# Patient Record
Sex: FEMALE | Race: WHITE | NOT HISPANIC OR LATINO | Employment: OTHER | URBAN - METROPOLITAN AREA
[De-identification: names, ages, dates, MRNs, and addresses within clinical notes are randomized per-mention and may not be internally consistent; named-entity substitution may affect disease eponyms.]

---

## 2017-01-07 RX ORDER — CLOPIDOGREL BISULFATE 75 MG/1
TABLET ORAL
Qty: 30 TABLET | Refills: 2 | Status: SHIPPED | OUTPATIENT
Start: 2017-01-07 | End: 2017-03-09

## 2017-01-09 ENCOUNTER — LAB (OUTPATIENT)
Dept: FAMILY MEDICINE CLINIC | Facility: CLINIC | Age: 82
End: 2017-01-09

## 2017-01-09 DIAGNOSIS — R79.89 ELEVATED SERUM CREATININE: ICD-10-CM

## 2017-01-09 LAB
ANION GAP SERPL CALCULATED.3IONS-SCNC: 15 MMOL/L
BUN BLD-MCNC: 34 MG/DL (ref 8–23)
BUN/CREAT SERPL: 27.2 (ref 7–25)
CALCIUM SPEC-SCNC: 8.9 MG/DL (ref 8.6–10.5)
CHLORIDE SERPL-SCNC: 100 MMOL/L (ref 98–107)
CO2 SERPL-SCNC: 29 MMOL/L (ref 22–29)
CREAT BLD-MCNC: 1.25 MG/DL (ref 0.57–1)
GFR SERPL CREATININE-BSD FRML MDRD: 41 ML/MIN/1.73
GLUCOSE BLD-MCNC: 88 MG/DL (ref 65–99)
POTASSIUM BLD-SCNC: 4.2 MMOL/L (ref 3.5–5.2)
SODIUM BLD-SCNC: 144 MMOL/L (ref 136–145)

## 2017-01-09 PROCEDURE — 80048 BASIC METABOLIC PNL TOTAL CA: CPT | Performed by: INTERNAL MEDICINE

## 2017-01-19 ENCOUNTER — TELEPHONE (OUTPATIENT)
Dept: FAMILY MEDICINE CLINIC | Facility: CLINIC | Age: 82
End: 2017-01-19

## 2017-01-19 NOTE — TELEPHONE ENCOUNTER
----- Message from Devan Snow Jr., MD sent at 1/19/2017 12:31 PM EST -----  Contact: LICO- DAUGHTER  [phone]  Patient's daughter was here discussed this with her briefly she does bring in a chart of her various blood pressure readings as going up to 202 diastolic historically has taken a half tablet once a day is to half tablet twice a day order Coreg 3.125 she is now up to half tablet 3 times a day to try to keep her controlled is still bouncing up to a systolic range in the 170s to 200.  Regarding change her to a whole tablet the morning whole tablet about 6 PM she is to call in some readings early tomorrow afternoon historically patient's been very sensitive medications and does tend has some orthostatic hypotension we will see what further adjustments we need.  She is on a very low-dose of Coreg but is been sensitive to this and all other blood pressure medicines we have tried to.  So expect a call tomorrow early afternoon and then we will see if we need to further address this  ----- Message -----     From: Clare Macedo     Sent: 1/19/2017  11:49 AM       To: Devan Snow Jr., MD, Mini Carr MA    PATIENTS BLOOD PRESSURE /87 PATIENT HAS BEEN TAKING AND EXTRA 1/2 OR A WHOLE ONE AND ITS NOT BRINGING BLOOD PRESSURE DOWN. BLOOD PRESSURE IS HIGH ABOUT EVERY OTHER DAY.

## 2017-01-25 RX ORDER — CARVEDILOL 3.12 MG/1
TABLET ORAL
Qty: 45 TABLET | Refills: 4 | Status: SHIPPED | OUTPATIENT
Start: 2017-01-25 | End: 2017-06-29 | Stop reason: SDUPTHER

## 2017-02-10 RX ORDER — PRAVASTATIN SODIUM 40 MG
TABLET ORAL
Qty: 30 TABLET | Refills: 0 | Status: SHIPPED | OUTPATIENT
Start: 2017-02-10 | End: 2017-04-06 | Stop reason: SDUPTHER

## 2017-02-10 RX ORDER — FUROSEMIDE 20 MG/1
TABLET ORAL
Qty: 60 TABLET | Refills: 0 | Status: SHIPPED | OUTPATIENT
Start: 2017-02-10 | End: 2017-03-08 | Stop reason: SDUPTHER

## 2017-02-10 RX ORDER — PRAVASTATIN SODIUM 40 MG
TABLET ORAL
Qty: 30 TABLET | Refills: 0 | Status: SHIPPED | OUTPATIENT
Start: 2017-02-10 | End: 2017-03-09

## 2017-02-17 ENCOUNTER — PATIENT OUTREACH (OUTPATIENT)
Dept: CASE MANAGEMENT | Facility: OTHER | Age: 82
End: 2017-02-17

## 2017-02-23 ENCOUNTER — PATIENT OUTREACH (OUTPATIENT)
Dept: CASE MANAGEMENT | Facility: OTHER | Age: 82
End: 2017-02-23

## 2017-02-24 RX ORDER — ESCITALOPRAM OXALATE 10 MG/1
TABLET ORAL
Qty: 30 TABLET | Refills: 0 | Status: SHIPPED | OUTPATIENT
Start: 2017-02-24 | End: 2017-03-09

## 2017-03-08 RX ORDER — FUROSEMIDE 20 MG/1
TABLET ORAL
Qty: 60 TABLET | Refills: 0 | Status: SHIPPED | OUTPATIENT
Start: 2017-03-08 | End: 2017-03-09

## 2017-03-08 RX ORDER — LEVOTHYROXINE SODIUM 0.05 MG/1
TABLET ORAL
Qty: 30 TABLET | Refills: 1 | Status: SHIPPED | OUTPATIENT
Start: 2017-03-08 | End: 2017-05-06 | Stop reason: SDUPTHER

## 2017-03-09 ENCOUNTER — OFFICE VISIT (OUTPATIENT)
Dept: FAMILY MEDICINE CLINIC | Facility: CLINIC | Age: 82
End: 2017-03-09

## 2017-03-09 VITALS
TEMPERATURE: 97.8 F | HEART RATE: 79 BPM | HEIGHT: 60 IN | WEIGHT: 164.2 LBS | OXYGEN SATURATION: 94 % | DIASTOLIC BLOOD PRESSURE: 56 MMHG | BODY MASS INDEX: 32.24 KG/M2 | SYSTOLIC BLOOD PRESSURE: 140 MMHG

## 2017-03-09 DIAGNOSIS — M79.671 FOOT ARCH PAIN, RIGHT: Primary | ICD-10-CM

## 2017-03-09 PROCEDURE — 73620 X-RAY EXAM OF FOOT: CPT | Performed by: INTERNAL MEDICINE

## 2017-03-09 PROCEDURE — 99213 OFFICE O/P EST LOW 20 MIN: CPT | Performed by: INTERNAL MEDICINE

## 2017-03-09 NOTE — PROGRESS NOTES
Subjective   Meggan Huynh is a 87 y.o. female.   Pain bruising right foot for approximately 3 days  History of Present Illness   This began happening 1 day bruising noted of the next  no specific injury.  No increased activity to raise risk of stress fracture.  Plain pain predominantly dorsal lateral foot with some bruising present himself some clear fluid in this area also.  No increased heat low pressure continues to be labile although controlled at her historical norm  Review of Systems   Musculoskeletal:        Pain right foot patient complaint see present illness   All other systems reviewed and are negative.      Objective   Vitals:    03/09/17 1410   BP: 140/56   Pulse: 79   Temp: 97.8 °F (36.6 °C)   SpO2: 94%   Weight: 164 lb 3.2 oz (74.5 kg)     Physical Exam   Constitutional: She appears well-developed and well-nourished.   Cardiovascular: Normal rate, regular rhythm and normal heart sounds.    Pulmonary/Chest: Effort normal and breath sounds normal.   Musculoskeletal:   Bruising dorsal lateral foot.  Mild pain over the bruised areas fourth and fifth and third metatarsals.  Mild discomfort with bowing of the arch.  Bruising is predominantly dorsal third metatarsal laterally.  Some slightly raised fluid collection cannot tell this reabsorption of the bruising fluid or not.  Patient denies any knowledge of any injury to explain this.  No near falls   Nursing note and vitals reviewed.      No results found for: INR    Procedures  Right foot x-ray 3 views obtained.  No comparison available.  No bony or soft tissue abnormalities are noted.  Assessment/Plan     Right foot pain plan were beyond that time her eyes needed elevate foot some periodically through the day relative rest patient is to wear supportive shoes when up if pain does not resolve or dramatically improved in 2 weeks time she is contact us determine if we will follow-up with her sent on orthopedic surgeon with a concern being a potential stress  fracture at that point    Much of this encounter note is an electronic transcription/translation of spoken language to printed text.  The electronic translation of spoken language may permit erroneous, or at times, nonsensical words or phrases to be inadvertently transcribed.  Although I have reviewed the note for such errors, some may still exist.

## 2017-03-21 DIAGNOSIS — E78.5 HYPERLIPIDEMIA, UNSPECIFIED HYPERLIPIDEMIA TYPE: Primary | ICD-10-CM

## 2017-03-23 RX ORDER — ESCITALOPRAM OXALATE 10 MG/1
TABLET ORAL
Qty: 30 TABLET | Refills: 0 | Status: SHIPPED | OUTPATIENT
Start: 2017-03-23 | End: 2017-04-11 | Stop reason: SDUPTHER

## 2017-04-06 RX ORDER — PRAVASTATIN SODIUM 40 MG
TABLET ORAL
Qty: 30 TABLET | Refills: 0 | Status: SHIPPED | OUTPATIENT
Start: 2017-04-06 | End: 2017-05-04 | Stop reason: SDUPTHER

## 2017-04-06 RX ORDER — FUROSEMIDE 20 MG/1
TABLET ORAL
Qty: 60 TABLET | Refills: 0 | Status: SHIPPED | OUTPATIENT
Start: 2017-04-06 | End: 2017-04-11 | Stop reason: SDUPTHER

## 2017-04-06 RX ORDER — CLOPIDOGREL BISULFATE 75 MG/1
TABLET ORAL
Qty: 30 TABLET | Refills: 1 | Status: SHIPPED | OUTPATIENT
Start: 2017-04-06 | End: 2017-04-11 | Stop reason: SDUPTHER

## 2017-04-11 ENCOUNTER — OFFICE VISIT (OUTPATIENT)
Dept: FAMILY MEDICINE CLINIC | Facility: CLINIC | Age: 82
End: 2017-04-11

## 2017-04-11 VITALS
DIASTOLIC BLOOD PRESSURE: 68 MMHG | HEIGHT: 60 IN | SYSTOLIC BLOOD PRESSURE: 152 MMHG | BODY MASS INDEX: 32.31 KG/M2 | WEIGHT: 164.6 LBS | TEMPERATURE: 98.2 F | HEART RATE: 75 BPM | OXYGEN SATURATION: 92 %

## 2017-04-11 DIAGNOSIS — E78.5 HYPERLIPIDEMIA, UNSPECIFIED HYPERLIPIDEMIA TYPE: ICD-10-CM

## 2017-04-11 DIAGNOSIS — R79.89 LOW VITAMIN D LEVEL: ICD-10-CM

## 2017-04-11 DIAGNOSIS — N28.9 RENAL INSUFFICIENCY: ICD-10-CM

## 2017-04-11 DIAGNOSIS — E03.8 OTHER SPECIFIED HYPOTHYROIDISM: Primary | ICD-10-CM

## 2017-04-11 DIAGNOSIS — I10 HYPERTENSION, ESSENTIAL: ICD-10-CM

## 2017-04-11 LAB
25(OH)D3 SERPL-MCNC: 45.8 NG/ML (ref 30–100)
ALBUMIN SERPL-MCNC: 4.1 G/DL (ref 3.5–5.2)
ALBUMIN/GLOB SERPL: 1.2 G/DL
ALP SERPL-CCNC: 94 U/L (ref 39–117)
ALT SERPL W P-5'-P-CCNC: 25 U/L (ref 1–33)
ANION GAP SERPL CALCULATED.3IONS-SCNC: 15.6 MMOL/L
AST SERPL-CCNC: 30 U/L (ref 1–32)
BILIRUB SERPL-MCNC: 0.3 MG/DL (ref 0.1–1.2)
BUN BLD-MCNC: 25 MG/DL (ref 8–23)
BUN/CREAT SERPL: 19.2 (ref 7–25)
CALCIUM SPEC-SCNC: 9 MG/DL (ref 8.6–10.5)
CHLORIDE SERPL-SCNC: 99 MMOL/L (ref 98–107)
CHOLEST SERPL-MCNC: 151 MG/DL (ref 0–200)
CO2 SERPL-SCNC: 29.4 MMOL/L (ref 22–29)
CREAT BLD-MCNC: 1.3 MG/DL (ref 0.57–1)
ERYTHROCYTE [DISTWIDTH] IN BLOOD BY AUTOMATED COUNT: 14.3 % (ref 4.5–15)
GFR SERPL CREATININE-BSD FRML MDRD: 39 ML/MIN/1.73
GLOBULIN UR ELPH-MCNC: 3.5 GM/DL
GLUCOSE BLD-MCNC: 92 MG/DL (ref 65–99)
HCT VFR BLD AUTO: 37.8 % (ref 31–42)
HDLC SERPL-MCNC: 57 MG/DL (ref 40–60)
HGB BLD-MCNC: 12.7 G/DL (ref 12–18)
LDLC SERPL CALC-MCNC: 58 MG/DL (ref 0–100)
LDLC/HDLC SERPL: 1.01 {RATIO}
LYMPHOCYTES # BLD AUTO: 1.7 10*3/MM3 (ref 1.2–3.4)
LYMPHOCYTES NFR BLD AUTO: 17.7 % (ref 21–51)
MCH RBC QN AUTO: 28.4 PG (ref 26.1–33.1)
MCHC RBC AUTO-ENTMCNC: 33.7 G/DL (ref 33–37)
MCV RBC AUTO: 84.4 FL (ref 80–99)
MONOCYTES # BLD AUTO: 0.7 10*3/MM3 (ref 0.1–0.6)
MONOCYTES NFR BLD AUTO: 6.9 % (ref 2–9)
NEUTROPHILS # BLD AUTO: 7.2 10*3/MM3 (ref 1.4–6.5)
NEUTROPHILS NFR BLD AUTO: 75.4 % (ref 42–75)
PLATELET # BLD AUTO: 179 10*3/MM3 (ref 150–450)
PMV BLD AUTO: 9.2 FL (ref 7.1–10.5)
POTASSIUM BLD-SCNC: 4.5 MMOL/L (ref 3.5–5.2)
PROT SERPL-MCNC: 7.6 G/DL (ref 6–8.5)
RBC # BLD AUTO: 4.48 10*6/MM3 (ref 4–6)
SODIUM BLD-SCNC: 144 MMOL/L (ref 136–145)
TRIGL SERPL-MCNC: 182 MG/DL (ref 0–150)
TSH SERPL DL<=0.05 MIU/L-ACNC: 2.26 MIU/ML (ref 0.27–4.2)
VLDLC SERPL-MCNC: 36.4 MG/DL (ref 5–40)
WBC NRBC COR # BLD: 9.5 10*3/MM3 (ref 4.5–10)

## 2017-04-11 PROCEDURE — 80053 COMPREHEN METABOLIC PANEL: CPT | Performed by: INTERNAL MEDICINE

## 2017-04-11 PROCEDURE — 80061 LIPID PANEL: CPT | Performed by: INTERNAL MEDICINE

## 2017-04-11 PROCEDURE — 85025 COMPLETE CBC W/AUTO DIFF WBC: CPT | Performed by: INTERNAL MEDICINE

## 2017-04-11 PROCEDURE — 99214 OFFICE O/P EST MOD 30 MIN: CPT | Performed by: INTERNAL MEDICINE

## 2017-04-11 PROCEDURE — 84443 ASSAY THYROID STIM HORMONE: CPT | Performed by: INTERNAL MEDICINE

## 2017-04-11 PROCEDURE — 82306 VITAMIN D 25 HYDROXY: CPT | Performed by: INTERNAL MEDICINE

## 2017-04-11 RX ORDER — AZELASTINE 1 MG/ML
1 SPRAY, METERED NASAL
COMMUNITY
End: 2017-07-11 | Stop reason: SDUPTHER

## 2017-04-11 NOTE — PROGRESS NOTES
Subjective   Meggan Huynh is a 87 y.o. female.   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  Patient needs update on vitamin D has chronic renal disease we'll get updated values.  Possibly stage III but daughter functions caregiver not sure.  Patient is doing fairly well.  Does not walk a lot is somewhat setting her legs but does function quite well with a walker she's got a little lax and this is being him some difficulty getting out of chair.  Is been suggest patient do formal physical therapy she is not interested he states because she does not want to trouble her family about transferring her to and from.  It encouraged walking around the house and outside with her walker.  We will get updated vitamin D and TSH as proximal lower leg weakness seems to be bigger problem functioning this point.  Dietary habits are not great and see more carb breakfast and lunch but does do a fair job at dinner didn't attempt to encourage more fruits and vegetables.  Also suggested family start multivitamin such as Centrum Silver for women for her given her less than optimal diet.    Review of Systems   Musculoskeletal:        Leg weakness   All other systems reviewed and are negative.      Objective   Vitals:    04/11/17 1543   BP: 152/68   Pulse: 75   Temp: 98.2 °F (36.8 °C)   SpO2: 92%   Weight: 164 lb 9.6 oz (74.7 kg)     Physical Exam   Constitutional: She appears well-developed and well-nourished.   Eyes:   Pupils equal   Cardiovascular: Normal rate, regular rhythm and normal heart sounds.    Pulmonary/Chest: Effort normal and breath sounds normal.   Abdominal: Soft. Bowel sounds are normal.   Neurological: She is alert.   Mentation fairly good memory not good   Nursing note reviewed.      No results found for: INR    Procedures    Assessment/Plan   1.  Hypothyroidism plan await lab if satisfactory recheck this in one years time    2.   Hypertension historically somewhat labile.  Review diary patient's blood pressures seem to have workable system the to reading for she is higher systolic 170s was the time when she found answering kitchen otherwise within her historical normal.    3.  Low vitamin D get updated values    4.  Hyperlipidemia plan await lab if satisfactory continue present pravastatin 40 mg daily    5.  Renal insufficiency await lab value last labs suggest she is going be a chronic renal disease stage III        Much of this encounter note is an electronic transcription/translation of spoken language to printed text.  The electronic translation of spoken language may permit erroneous, or at times, nonsensical words or phrases to be inadvertently transcribed.  Although I have reviewed the note for such errors, some may still exist.

## 2017-04-13 DIAGNOSIS — R79.89 ELEVATED SERUM CREATININE: Primary | ICD-10-CM

## 2017-04-21 RX ORDER — ESCITALOPRAM OXALATE 10 MG/1
TABLET ORAL
Qty: 30 TABLET | Refills: 0 | Status: SHIPPED | OUTPATIENT
Start: 2017-04-21 | End: 2017-05-18 | Stop reason: SDUPTHER

## 2017-05-04 RX ORDER — FUROSEMIDE 20 MG/1
TABLET ORAL
Qty: 60 TABLET | Refills: 0 | Status: SHIPPED | OUTPATIENT
Start: 2017-05-04 | End: 2017-05-31 | Stop reason: SDUPTHER

## 2017-05-04 RX ORDER — PRAVASTATIN SODIUM 40 MG
TABLET ORAL
Qty: 30 TABLET | Refills: 0 | Status: SHIPPED | OUTPATIENT
Start: 2017-05-04 | End: 2017-05-31 | Stop reason: SDUPTHER

## 2017-05-08 RX ORDER — LEVOTHYROXINE SODIUM 0.05 MG/1
TABLET ORAL
Qty: 30 TABLET | Refills: 0 | Status: SHIPPED | OUTPATIENT
Start: 2017-05-08 | End: 2017-06-07 | Stop reason: SDUPTHER

## 2017-05-11 ENCOUNTER — LAB (OUTPATIENT)
Dept: FAMILY MEDICINE CLINIC | Facility: CLINIC | Age: 82
End: 2017-05-11

## 2017-05-11 DIAGNOSIS — R79.89 ELEVATED SERUM CREATININE: ICD-10-CM

## 2017-05-11 LAB
ANION GAP SERPL CALCULATED.3IONS-SCNC: 11.7 MMOL/L
BUN BLD-MCNC: 29 MG/DL (ref 8–23)
BUN/CREAT SERPL: 23.8 (ref 7–25)
CALCIUM SPEC-SCNC: 8.8 MG/DL (ref 8.6–10.5)
CHLORIDE SERPL-SCNC: 98 MMOL/L (ref 98–107)
CO2 SERPL-SCNC: 31.3 MMOL/L (ref 22–29)
CREAT BLD-MCNC: 1.22 MG/DL (ref 0.57–1)
GFR SERPL CREATININE-BSD FRML MDRD: 42 ML/MIN/1.73
GLUCOSE BLD-MCNC: 87 MG/DL (ref 65–99)
POTASSIUM BLD-SCNC: 4.2 MMOL/L (ref 3.5–5.2)
SODIUM BLD-SCNC: 141 MMOL/L (ref 136–145)

## 2017-05-11 PROCEDURE — 80048 BASIC METABOLIC PNL TOTAL CA: CPT | Performed by: INTERNAL MEDICINE

## 2017-05-18 RX ORDER — ESCITALOPRAM OXALATE 10 MG/1
TABLET ORAL
Qty: 30 TABLET | Refills: 0 | Status: SHIPPED | OUTPATIENT
Start: 2017-05-18 | End: 2017-06-20 | Stop reason: SDUPTHER

## 2017-05-31 RX ORDER — FUROSEMIDE 20 MG/1
TABLET ORAL
Qty: 60 TABLET | Refills: 0 | Status: SHIPPED | OUTPATIENT
Start: 2017-05-31 | End: 2017-06-29 | Stop reason: SDUPTHER

## 2017-05-31 RX ORDER — PRAVASTATIN SODIUM 40 MG
TABLET ORAL
Qty: 30 TABLET | Refills: 0 | Status: SHIPPED | OUTPATIENT
Start: 2017-05-31 | End: 2017-06-29 | Stop reason: SDUPTHER

## 2017-06-03 RX ORDER — CLOPIDOGREL BISULFATE 75 MG/1
TABLET ORAL
Qty: 30 TABLET | Refills: 0 | Status: SHIPPED | OUTPATIENT
Start: 2017-06-03 | End: 2017-06-15 | Stop reason: SDUPTHER

## 2017-06-07 RX ORDER — LEVOTHYROXINE SODIUM 0.05 MG/1
TABLET ORAL
Qty: 30 TABLET | Refills: 0 | Status: SHIPPED | OUTPATIENT
Start: 2017-06-07 | End: 2017-07-04 | Stop reason: SDUPTHER

## 2017-06-15 RX ORDER — CLOPIDOGREL BISULFATE 75 MG/1
TABLET ORAL
Qty: 30 TABLET | Refills: 0 | Status: SHIPPED | OUTPATIENT
Start: 2017-06-15 | End: 2017-07-16 | Stop reason: SDUPTHER

## 2017-06-20 RX ORDER — ESCITALOPRAM OXALATE 10 MG/1
TABLET ORAL
Qty: 30 TABLET | Refills: 0 | Status: SHIPPED | OUTPATIENT
Start: 2017-06-20 | End: 2017-07-20 | Stop reason: SDUPTHER

## 2017-06-29 RX ORDER — PRAVASTATIN SODIUM 40 MG
TABLET ORAL
Qty: 30 TABLET | Refills: 0 | Status: SHIPPED | OUTPATIENT
Start: 2017-06-29 | End: 2017-07-26 | Stop reason: SDUPTHER

## 2017-06-29 RX ORDER — FUROSEMIDE 20 MG/1
TABLET ORAL
Qty: 60 TABLET | Refills: 0 | Status: SHIPPED | OUTPATIENT
Start: 2017-06-29 | End: 2017-07-11 | Stop reason: DRUGHIGH

## 2017-06-29 RX ORDER — CARVEDILOL 3.12 MG/1
TABLET ORAL
Qty: 45 TABLET | Refills: 3 | Status: SHIPPED | OUTPATIENT
Start: 2017-06-29 | End: 2017-09-14 | Stop reason: SDUPTHER

## 2017-07-05 RX ORDER — LEVOTHYROXINE SODIUM 0.05 MG/1
TABLET ORAL
Qty: 30 TABLET | Refills: 0 | Status: SHIPPED | OUTPATIENT
Start: 2017-07-05 | End: 2017-08-03 | Stop reason: SDUPTHER

## 2017-07-10 ENCOUNTER — TELEPHONE (OUTPATIENT)
Dept: FAMILY MEDICINE CLINIC | Facility: CLINIC | Age: 82
End: 2017-07-10

## 2017-07-10 NOTE — TELEPHONE ENCOUNTER
PATIENT IS BEING SEEN TOMORROW. PATIENT IS ON BREO AND SPIRIVA THEY BOTH COST WAY TO MUCH AND WANTS TO KNOW IF WE HAVE  SAMPLES. OR SOMETHING ELSE THAT CAN BE CALLED IN

## 2017-07-10 NOTE — TELEPHONE ENCOUNTER
Lm informing we do have samples of breo and she can discuss with dr mercado something to replace yumiko

## 2017-07-11 ENCOUNTER — OFFICE VISIT (OUTPATIENT)
Dept: FAMILY MEDICINE CLINIC | Facility: CLINIC | Age: 82
End: 2017-07-11

## 2017-07-11 VITALS
DIASTOLIC BLOOD PRESSURE: 70 MMHG | SYSTOLIC BLOOD PRESSURE: 138 MMHG | HEIGHT: 60 IN | WEIGHT: 161.4 LBS | OXYGEN SATURATION: 91 % | TEMPERATURE: 97.6 F | BODY MASS INDEX: 31.69 KG/M2 | HEART RATE: 76 BPM

## 2017-07-11 DIAGNOSIS — I10 HYPERTENSION, ESSENTIAL: Primary | ICD-10-CM

## 2017-07-11 DIAGNOSIS — J42 CHRONIC BRONCHITIS, UNSPECIFIED CHRONIC BRONCHITIS TYPE (HCC): ICD-10-CM

## 2017-07-11 DIAGNOSIS — R47.01 EXPRESSIVE APHASIA: ICD-10-CM

## 2017-07-11 DIAGNOSIS — E03.8 OTHER SPECIFIED HYPOTHYROIDISM: ICD-10-CM

## 2017-07-11 LAB
ALBUMIN SERPL-MCNC: 4.5 G/DL (ref 3.5–5.2)
ALBUMIN/GLOB SERPL: 1.5 G/DL
ALP SERPL-CCNC: 74 U/L (ref 39–117)
ALT SERPL W P-5'-P-CCNC: 13 U/L (ref 1–33)
ANION GAP SERPL CALCULATED.3IONS-SCNC: 14.4 MMOL/L
AST SERPL-CCNC: 22 U/L (ref 1–32)
BILIRUB SERPL-MCNC: 0.5 MG/DL (ref 0.1–1.2)
BUN BLD-MCNC: 29 MG/DL (ref 8–23)
BUN/CREAT SERPL: 22.8 (ref 7–25)
CALCIUM SPEC-SCNC: 8.7 MG/DL (ref 8.6–10.5)
CHLORIDE SERPL-SCNC: 96 MMOL/L (ref 98–107)
CO2 SERPL-SCNC: 28.6 MMOL/L (ref 22–29)
CREAT BLD-MCNC: 1.27 MG/DL (ref 0.57–1)
GFR SERPL CREATININE-BSD FRML MDRD: 40 ML/MIN/1.73
GLOBULIN UR ELPH-MCNC: 3.1 GM/DL
GLUCOSE BLD-MCNC: 97 MG/DL (ref 65–99)
POTASSIUM BLD-SCNC: 4.2 MMOL/L (ref 3.5–5.2)
PROT SERPL-MCNC: 7.6 G/DL (ref 6–8.5)
SODIUM BLD-SCNC: 139 MMOL/L (ref 136–145)
TSH SERPL DL<=0.05 MIU/L-ACNC: 1.34 MIU/ML (ref 0.27–4.2)
VIT B12 BLD-MCNC: 485 PG/ML (ref 211–946)

## 2017-07-11 PROCEDURE — 84443 ASSAY THYROID STIM HORMONE: CPT | Performed by: INTERNAL MEDICINE

## 2017-07-11 PROCEDURE — 80053 COMPREHEN METABOLIC PANEL: CPT | Performed by: INTERNAL MEDICINE

## 2017-07-11 PROCEDURE — 99214 OFFICE O/P EST MOD 30 MIN: CPT | Performed by: INTERNAL MEDICINE

## 2017-07-11 PROCEDURE — 82607 VITAMIN B-12: CPT | Performed by: INTERNAL MEDICINE

## 2017-07-11 NOTE — PROGRESS NOTES
Subjective   Meggan Huynh is a 87 y.o. female.   Follow-up on blood pressure, thyroid  History of Present Illness   Also has COPD is in donut hole reveals work well for her we will give her real sample.  Does have pulmonologist follows her also no increased shortness of air reported has a known minimal expressive aphagia from an old CVA still having some difficulty with word choice or expressing words and remind.  She thinks is getting slightly worse over time or no balance weakness visual disturbances or other neurologic somnolent same time.  She does have known hypothyroidism we'll get updated thyroid as well as get B12 see if this might be indicated with last lipids being good we will probably wait another 3 months get lipid profile and patient.  They did bring in diary blood pressure readings which she does trended be a little bit high in the evening and start patient been labile going from low to high with same day 1 low reading about niacin systolic was not orthostatic or felt bad at that time.  Should about 160 or so intermittently more so in the evening we'll try switching her Coreg from whole tablet in the morning know half tablet in the evening to a half tablet in the morning and whole tablet in the evenings to see if this helps.    Review of Systems   All other systems reviewed and are negative.      Objective   Vitals:    07/11/17 1303   BP: 138/70   Pulse: 76   Temp: 97.6 °F (36.4 °C)   SpO2: 91%   Weight: 161 lb 6.4 oz (73.2 kg)     Physical Exam   Constitutional: She appears well-developed and well-nourished.   HENT:   Head: Normocephalic and atraumatic.   Eyes: Conjunctivae are normal. Pupils are equal, round, and reactive to light.   Neck:   No carotid bruits   Cardiovascular: Normal rate, regular rhythm and normal heart sounds.    Pulmonary/Chest: Effort normal and breath sounds normal. No respiratory distress. She has no wheezes.   Musculoskeletal:   Trace to 1+ lower leg edema within patient's  norm   Neurological:   Perhaps small delay choosing words on occasion, this is subtle.  No facial asymmetry other neurologic symptoms are noted.   Skin: Skin is warm and dry.   Nursing note and vitals reviewed.      No results found for: INR    Procedures    Assessment/Plan   1.  Hypertension plan continue present medicines with readjustment as above    2.  COPD stable Breo samples given    3.  Expressive aphasia plan recheck thyroid B12    4.  Hypothyroidism get updated TSH follow-up in 3 months and as needed for lab work satisfactory    Much of this encounter note is an electronic transcription/translation of spoken language to printed text.  The electronic translation of spoken language may permit erroneous, or at times, nonsensical words or phrases to be inadvertently transcribed.  Although I have reviewed the note for such errors, some may still exist.

## 2017-07-17 RX ORDER — CLOPIDOGREL BISULFATE 75 MG/1
TABLET ORAL
Qty: 30 TABLET | Refills: 0 | Status: SHIPPED | OUTPATIENT
Start: 2017-07-17 | End: 2017-08-13 | Stop reason: SDUPTHER

## 2017-07-20 RX ORDER — ESCITALOPRAM OXALATE 10 MG/1
TABLET ORAL
Qty: 30 TABLET | Refills: 5 | Status: SHIPPED | OUTPATIENT
Start: 2017-07-20 | End: 2018-01-18 | Stop reason: SDUPTHER

## 2017-07-26 RX ORDER — PRAVASTATIN SODIUM 40 MG
TABLET ORAL
Qty: 30 TABLET | Refills: 0 | Status: SHIPPED | OUTPATIENT
Start: 2017-07-26 | End: 2017-08-22 | Stop reason: SDUPTHER

## 2017-07-26 RX ORDER — FUROSEMIDE 20 MG/1
TABLET ORAL
Qty: 60 TABLET | Refills: 0 | Status: SHIPPED | OUTPATIENT
Start: 2017-07-26 | End: 2017-08-22 | Stop reason: SDUPTHER

## 2017-08-03 RX ORDER — LEVOTHYROXINE SODIUM 0.05 MG/1
TABLET ORAL
Qty: 30 TABLET | Refills: 0 | Status: SHIPPED | OUTPATIENT
Start: 2017-08-03 | End: 2017-08-29 | Stop reason: SDUPTHER

## 2017-08-14 RX ORDER — CLOPIDOGREL BISULFATE 75 MG/1
TABLET ORAL
Qty: 30 TABLET | Refills: 0 | Status: SHIPPED | OUTPATIENT
Start: 2017-08-14 | End: 2017-09-09 | Stop reason: SDUPTHER

## 2017-08-22 RX ORDER — FUROSEMIDE 20 MG/1
TABLET ORAL
Qty: 60 TABLET | Refills: 0 | Status: SHIPPED | OUTPATIENT
Start: 2017-08-22 | End: 2017-09-20 | Stop reason: SDUPTHER

## 2017-08-22 RX ORDER — PRAVASTATIN SODIUM 40 MG
TABLET ORAL
Qty: 30 TABLET | Refills: 0 | Status: SHIPPED | OUTPATIENT
Start: 2017-08-22 | End: 2017-09-20 | Stop reason: SDUPTHER

## 2017-08-29 RX ORDER — LEVOTHYROXINE SODIUM 0.05 MG/1
TABLET ORAL
Qty: 30 TABLET | Refills: 0 | Status: SHIPPED | OUTPATIENT
Start: 2017-08-29 | End: 2017-09-25 | Stop reason: SDUPTHER

## 2017-09-11 RX ORDER — CLOPIDOGREL BISULFATE 75 MG/1
TABLET ORAL
Qty: 30 TABLET | Refills: 0 | Status: SHIPPED | OUTPATIENT
Start: 2017-09-11 | End: 2017-10-08 | Stop reason: SDUPTHER

## 2017-09-14 ENCOUNTER — TELEPHONE (OUTPATIENT)
Dept: FAMILY MEDICINE CLINIC | Facility: CLINIC | Age: 82
End: 2017-09-14

## 2017-09-14 RX ORDER — CARVEDILOL 3.12 MG/1
3.12 TABLET ORAL 2 TIMES DAILY WITH MEALS
Qty: 45 TABLET | Refills: 3
Start: 2017-09-14 | End: 2018-01-02 | Stop reason: SDUPTHER

## 2017-09-14 NOTE — TELEPHONE ENCOUNTER
PT BP HAS BEEN HIGH FOUR NIGHTS IN A ROW. WANTS TO INCREASE CARVEDILOL 3.125MG TO 1 PIL IN MORNING AND 1 PILL AT DINNER.

## 2017-09-14 NOTE — TELEPHONE ENCOUNTER
This is okay to do call in blood pressure readings on Monday also when we get report of high I want numbers i.e. numerical values not just a simple high lower just right statement

## 2017-09-20 RX ORDER — PRAVASTATIN SODIUM 40 MG
TABLET ORAL
Qty: 30 TABLET | Refills: 0 | Status: SHIPPED | OUTPATIENT
Start: 2017-09-20 | End: 2017-10-16 | Stop reason: SDUPTHER

## 2017-09-20 RX ORDER — FUROSEMIDE 20 MG/1
TABLET ORAL
Qty: 60 TABLET | Refills: 0 | Status: SHIPPED | OUTPATIENT
Start: 2017-09-20 | End: 2017-10-16 | Stop reason: SDUPTHER

## 2017-09-25 RX ORDER — LEVOTHYROXINE SODIUM 0.05 MG/1
TABLET ORAL
Qty: 30 TABLET | Refills: 0 | Status: SHIPPED | OUTPATIENT
Start: 2017-09-25 | End: 2017-10-27 | Stop reason: SDUPTHER

## 2017-10-09 RX ORDER — CLOPIDOGREL BISULFATE 75 MG/1
TABLET ORAL
Qty: 30 TABLET | Refills: 0 | Status: SHIPPED | OUTPATIENT
Start: 2017-10-09 | End: 2017-11-05 | Stop reason: SDUPTHER

## 2017-10-16 RX ORDER — PRAVASTATIN SODIUM 40 MG
TABLET ORAL
Qty: 30 TABLET | Refills: 0 | Status: SHIPPED | OUTPATIENT
Start: 2017-10-16 | End: 2017-11-15 | Stop reason: SDUPTHER

## 2017-10-16 RX ORDER — FUROSEMIDE 20 MG/1
TABLET ORAL
Qty: 60 TABLET | Refills: 0 | Status: SHIPPED | OUTPATIENT
Start: 2017-10-16 | End: 2017-11-15 | Stop reason: SDUPTHER

## 2017-10-19 ENCOUNTER — OFFICE VISIT (OUTPATIENT)
Dept: FAMILY MEDICINE CLINIC | Facility: CLINIC | Age: 82
End: 2017-10-19

## 2017-10-19 VITALS
BODY MASS INDEX: 31.8 KG/M2 | HEIGHT: 60 IN | HEART RATE: 82 BPM | TEMPERATURE: 97.5 F | SYSTOLIC BLOOD PRESSURE: 122 MMHG | WEIGHT: 162 LBS | OXYGEN SATURATION: 91 % | DIASTOLIC BLOOD PRESSURE: 62 MMHG

## 2017-10-19 DIAGNOSIS — I10 HYPERTENSION, ESSENTIAL: Primary | ICD-10-CM

## 2017-10-19 DIAGNOSIS — N18.30 CRD (CHRONIC RENAL DISEASE), STAGE III (HCC): ICD-10-CM

## 2017-10-19 DIAGNOSIS — E78.49 OTHER HYPERLIPIDEMIA: ICD-10-CM

## 2017-10-19 LAB
ALBUMIN SERPL-MCNC: 4.1 G/DL (ref 3.5–5.2)
ALBUMIN/GLOB SERPL: 1.3 G/DL
ALP SERPL-CCNC: 101 U/L (ref 39–117)
ALT SERPL W P-5'-P-CCNC: 22 U/L (ref 1–33)
ANION GAP SERPL CALCULATED.3IONS-SCNC: 13.2 MMOL/L
AST SERPL-CCNC: 37 U/L (ref 1–32)
BILIRUB SERPL-MCNC: 0.4 MG/DL (ref 0.1–1.2)
BUN BLD-MCNC: 29 MG/DL (ref 8–23)
BUN/CREAT SERPL: 21.3 (ref 7–25)
CALCIUM SPEC-SCNC: 9 MG/DL (ref 8.6–10.5)
CHLORIDE SERPL-SCNC: 101 MMOL/L (ref 98–107)
CHOLEST SERPL-MCNC: 129 MG/DL (ref 0–200)
CO2 SERPL-SCNC: 30.8 MMOL/L (ref 22–29)
CREAT BLD-MCNC: 1.36 MG/DL (ref 0.57–1)
GFR SERPL CREATININE-BSD FRML MDRD: 37 ML/MIN/1.73
GLOBULIN UR ELPH-MCNC: 3.2 GM/DL
GLUCOSE BLD-MCNC: 81 MG/DL (ref 65–99)
HDLC SERPL-MCNC: 52 MG/DL (ref 40–60)
LDLC SERPL CALC-MCNC: 48 MG/DL (ref 0–100)
LDLC/HDLC SERPL: 0.92 {RATIO}
POTASSIUM BLD-SCNC: 4.3 MMOL/L (ref 3.5–5.2)
PROT SERPL-MCNC: 7.3 G/DL (ref 6–8.5)
SODIUM BLD-SCNC: 145 MMOL/L (ref 136–145)
TRIGL SERPL-MCNC: 145 MG/DL (ref 0–150)
VLDLC SERPL-MCNC: 29 MG/DL (ref 5–40)

## 2017-10-19 PROCEDURE — 90471 IMMUNIZATION ADMIN: CPT | Performed by: INTERNAL MEDICINE

## 2017-10-19 PROCEDURE — 80061 LIPID PANEL: CPT | Performed by: INTERNAL MEDICINE

## 2017-10-19 PROCEDURE — 90662 IIV NO PRSV INCREASED AG IM: CPT | Performed by: INTERNAL MEDICINE

## 2017-10-19 PROCEDURE — 99214 OFFICE O/P EST MOD 30 MIN: CPT | Performed by: INTERNAL MEDICINE

## 2017-10-19 PROCEDURE — 36415 COLL VENOUS BLD VENIPUNCTURE: CPT | Performed by: INTERNAL MEDICINE

## 2017-10-19 PROCEDURE — 80053 COMPREHEN METABOLIC PANEL: CPT | Performed by: INTERNAL MEDICINE

## 2017-10-19 RX ORDER — ZAFIRLUKAST 20 MG/1
20 TABLET, FILM COATED ORAL DAILY
Qty: 30 TABLET | Refills: 5 | Status: SHIPPED | OUTPATIENT
Start: 2017-10-19 | End: 2017-10-21 | Stop reason: SDUPTHER

## 2017-10-19 NOTE — PROGRESS NOTES
Subjective   Meggan Huynh is a 88 y.o. female.   Follow-up on high blood pressure, lipids and chronic renal disease  History of Present Illness   Historically has labile hypertension recent upper adjustment of cor aches been well-tolerated.  Doing fairly well no listed blood pressures all within acceptable range no lightheaded spells which is very good about patient standards.  Compliant with medications other complaints      Review of Systems   Constitutional: Negative.    HENT: Negative.    Eyes: Negative.    Respiratory: Negative.    Cardiovascular: Negative.    Gastrointestinal: Negative.    Endocrine: Negative.    Genitourinary: Negative.    Musculoskeletal: Positive for joint swelling.   Skin: Negative.    Allergic/Immunologic: Positive for environmental allergies.   Neurological: Negative.    Hematological: Negative.    Psychiatric/Behavioral: Negative.        Objective   Physical Exam   Constitutional: She appears well-developed and well-nourished.   HENT:   Head: Normocephalic and atraumatic.   Eyes: Conjunctivae are normal. Pupils are equal, round, and reactive to light.   Cardiovascular: Normal rate, regular rhythm and normal heart sounds.    Pulmonary/Chest: Effort normal and breath sounds normal.   Abdominal: Soft. Bowel sounds are normal.   Neurological: She is alert.   She neurological intact mild memory deficit residual aging but nothing of an acute or changing nature.   Skin: Skin is warm and dry.   Vitals reviewed.      Assessment/Plan   1.  Hypertension controlled plan continue present medicine recheck in 3 months    2.  Hyperlipidemia plan await lab if satisfactory recheck in 3-6 months    3.  Chronic disease stage III assess for stability call for lab results in 4 days' time    Much of this encounter note is an electronic transcription/translation of spoken language to printed text.  The electronic translation of spoken language may permit erroneous, or at times, nonsensical words or phrases  to be inadvertently transcribed.  Although I have reviewed the note for such errors, some may still exist.

## 2017-10-23 RX ORDER — ZAFIRLUKAST 20 MG/1
TABLET, FILM COATED ORAL
Qty: 60 TABLET | Refills: 4 | Status: SHIPPED | OUTPATIENT
Start: 2017-10-23 | End: 2018-01-01

## 2017-10-24 ENCOUNTER — TELEPHONE (OUTPATIENT)
Dept: FAMILY MEDICINE CLINIC | Facility: CLINIC | Age: 82
End: 2017-10-24

## 2017-10-24 DIAGNOSIS — N28.9 ABNORMAL KIDNEY FUNCTION: Primary | ICD-10-CM

## 2017-10-27 RX ORDER — LEVOTHYROXINE SODIUM 0.05 MG/1
TABLET ORAL
Qty: 30 TABLET | Refills: 0 | Status: SHIPPED | OUTPATIENT
Start: 2017-10-27 | End: 2017-11-24 | Stop reason: SDUPTHER

## 2017-11-05 RX ORDER — CLOPIDOGREL BISULFATE 75 MG/1
TABLET ORAL
Qty: 30 TABLET | Refills: 0 | Status: SHIPPED | OUTPATIENT
Start: 2017-11-05 | End: 2017-12-08 | Stop reason: SDUPTHER

## 2017-11-09 ENCOUNTER — TELEPHONE (OUTPATIENT)
Dept: FAMILY MEDICINE CLINIC | Facility: CLINIC | Age: 82
End: 2017-11-09

## 2017-11-09 NOTE — TELEPHONE ENCOUNTER
PATIENT HAS COPD AND IS TAKING BREO. INSURANCE IS NO LONGER TAKING BREO NEEDS TO SWITCH BACK TO ADVAIR PATIENT DOESN'T NEED ANY RIGHT NOW JUST A  FYI

## 2017-11-15 RX ORDER — FUROSEMIDE 20 MG/1
TABLET ORAL
Qty: 60 TABLET | Refills: 0 | Status: SHIPPED | OUTPATIENT
Start: 2017-11-15 | End: 2017-12-13 | Stop reason: SDUPTHER

## 2017-11-15 RX ORDER — PRAVASTATIN SODIUM 40 MG
TABLET ORAL
Qty: 30 TABLET | Refills: 0 | Status: SHIPPED | OUTPATIENT
Start: 2017-11-15 | End: 2017-12-13 | Stop reason: SDUPTHER

## 2017-11-20 ENCOUNTER — TELEPHONE (OUTPATIENT)
Dept: FAMILY MEDICINE CLINIC | Facility: CLINIC | Age: 82
End: 2017-11-20

## 2017-11-20 NOTE — TELEPHONE ENCOUNTER
Samples advair or rx called in if we don't have any samples, the breo is to [facility] [phone]

## 2017-11-27 ENCOUNTER — LAB (OUTPATIENT)
Dept: FAMILY MEDICINE CLINIC | Facility: CLINIC | Age: 82
End: 2017-11-27

## 2017-11-27 DIAGNOSIS — N28.9 ABNORMAL KIDNEY FUNCTION: ICD-10-CM

## 2017-11-27 LAB
ANION GAP SERPL CALCULATED.3IONS-SCNC: 14.1 MMOL/L
BUN BLD-MCNC: 29 MG/DL (ref 8–23)
BUN/CREAT SERPL: 24.4 (ref 7–25)
CALCIUM SPEC-SCNC: 8.6 MG/DL (ref 8.6–10.5)
CHLORIDE SERPL-SCNC: 102 MMOL/L (ref 98–107)
CO2 SERPL-SCNC: 28.9 MMOL/L (ref 22–29)
CREAT BLD-MCNC: 1.19 MG/DL (ref 0.57–1)
GFR SERPL CREATININE-BSD FRML MDRD: 43 ML/MIN/1.73
GLUCOSE BLD-MCNC: 63 MG/DL (ref 65–99)
POTASSIUM BLD-SCNC: 4.1 MMOL/L (ref 3.5–5.2)
SODIUM BLD-SCNC: 145 MMOL/L (ref 136–145)

## 2017-11-27 PROCEDURE — 80048 BASIC METABOLIC PNL TOTAL CA: CPT | Performed by: INTERNAL MEDICINE

## 2017-11-27 RX ORDER — LEVOTHYROXINE SODIUM 0.05 MG/1
TABLET ORAL
Qty: 30 TABLET | Refills: 0 | Status: SHIPPED | OUTPATIENT
Start: 2017-11-27 | End: 2017-12-26 | Stop reason: SDUPTHER

## 2017-12-08 RX ORDER — CLOPIDOGREL BISULFATE 75 MG/1
TABLET ORAL
Qty: 30 TABLET | Refills: 0 | Status: SHIPPED | OUTPATIENT
Start: 2017-12-08 | End: 2018-03-08 | Stop reason: SDUPTHER

## 2017-12-13 RX ORDER — FUROSEMIDE 20 MG/1
TABLET ORAL
Qty: 60 TABLET | Refills: 0 | Status: SHIPPED | OUTPATIENT
Start: 2017-12-13 | End: 2018-01-18 | Stop reason: SDUPTHER

## 2017-12-13 RX ORDER — PRAVASTATIN SODIUM 40 MG
TABLET ORAL
Qty: 30 TABLET | Refills: 0 | Status: SHIPPED | OUTPATIENT
Start: 2017-12-13 | End: 2018-01-18 | Stop reason: SDUPTHER

## 2017-12-26 RX ORDER — LEVOTHYROXINE SODIUM 0.05 MG/1
TABLET ORAL
Qty: 30 TABLET | Refills: 0 | Status: SHIPPED | OUTPATIENT
Start: 2017-12-26 | End: 2018-01-22 | Stop reason: SDUPTHER

## 2018-01-01 ENCOUNTER — TELEPHONE (OUTPATIENT)
Dept: FAMILY MEDICINE CLINIC | Facility: CLINIC | Age: 83
End: 2018-01-01

## 2018-01-01 ENCOUNTER — HOSPITAL ENCOUNTER (EMERGENCY)
Facility: HOSPITAL | Age: 83
Discharge: HOME | End: 2018-07-03
Attending: EMERGENCY MEDICINE | Admitting: EMERGENCY MEDICINE

## 2018-01-01 ENCOUNTER — APPOINTMENT (OUTPATIENT)
Dept: CT IMAGING | Facility: HOSPITAL | Age: 83
End: 2018-01-01

## 2018-01-01 ENCOUNTER — HOSPITAL ENCOUNTER (OUTPATIENT)
Dept: CT IMAGING | Facility: HOSPITAL | Age: 83
Discharge: HOME | End: 2018-07-16
Attending: INTERNAL MEDICINE | Admitting: INTERNAL MEDICINE

## 2018-01-01 ENCOUNTER — APPOINTMENT (OUTPATIENT)
Dept: GENERAL RADIOLOGY | Facility: HOSPITAL | Age: 83
End: 2018-01-01

## 2018-01-01 ENCOUNTER — PATIENT OUTREACH (OUTPATIENT)
Dept: CASE MANAGEMENT | Facility: OTHER | Age: 83
End: 2018-01-01

## 2018-01-01 ENCOUNTER — TELEPHONE (OUTPATIENT)
Dept: NEUROLOGY | Facility: CLINIC | Age: 83
End: 2018-01-01

## 2018-01-01 ENCOUNTER — EPISODE CHANGES (OUTPATIENT)
Dept: CASE MANAGEMENT | Facility: OTHER | Age: 83
End: 2018-01-01

## 2018-01-01 ENCOUNTER — TELEPHONE (OUTPATIENT)
Dept: CARDIOLOGY | Facility: CLINIC | Age: 83
End: 2018-01-01

## 2018-01-01 ENCOUNTER — HOSPITAL ENCOUNTER (INPATIENT)
Facility: HOSPITAL | Age: 83
LOS: 6 days | Discharge: NURSING, REHAB OR LONG-TERM CARE | End: 2018-07-12
Attending: EMERGENCY MEDICINE | Admitting: INTERNAL MEDICINE

## 2018-01-01 ENCOUNTER — TRANSCRIBE ORDERS (OUTPATIENT)
Dept: ADMINISTRATIVE | Facility: HOSPITAL | Age: 83
End: 2018-01-01

## 2018-01-01 ENCOUNTER — APPOINTMENT (OUTPATIENT)
Dept: SLEEP MEDICINE | Facility: HOSPITAL | Age: 83
End: 2018-01-01
Attending: INTERNAL MEDICINE

## 2018-01-01 ENCOUNTER — OFFICE VISIT (OUTPATIENT)
Dept: FAMILY MEDICINE CLINIC | Facility: CLINIC | Age: 83
End: 2018-01-01

## 2018-01-01 ENCOUNTER — APPOINTMENT (OUTPATIENT)
Dept: CARDIOLOGY | Facility: HOSPITAL | Age: 83
End: 2018-01-01
Attending: INTERNAL MEDICINE

## 2018-01-01 ENCOUNTER — APPOINTMENT (OUTPATIENT)
Dept: MRI IMAGING | Facility: HOSPITAL | Age: 83
End: 2018-01-01

## 2018-01-01 VITALS
HEART RATE: 72 BPM | OXYGEN SATURATION: 97 % | SYSTOLIC BLOOD PRESSURE: 132 MMHG | DIASTOLIC BLOOD PRESSURE: 69 MMHG | WEIGHT: 173 LBS | HEIGHT: 62 IN | TEMPERATURE: 97.6 F | RESPIRATION RATE: 18 BRPM | BODY MASS INDEX: 31.83 KG/M2

## 2018-01-01 VITALS
WEIGHT: 167 LBS | OXYGEN SATURATION: 96 % | TEMPERATURE: 96.5 F | HEIGHT: 60 IN | HEART RATE: 60 BPM | SYSTOLIC BLOOD PRESSURE: 181 MMHG | RESPIRATION RATE: 16 BRPM | BODY MASS INDEX: 32.79 KG/M2 | DIASTOLIC BLOOD PRESSURE: 75 MMHG

## 2018-01-01 VITALS
OXYGEN SATURATION: 93 % | SYSTOLIC BLOOD PRESSURE: 130 MMHG | WEIGHT: 167.4 LBS | RESPIRATION RATE: 16 BRPM | HEART RATE: 120 BPM | DIASTOLIC BLOOD PRESSURE: 80 MMHG | TEMPERATURE: 96.9 F | HEIGHT: 60 IN | BODY MASS INDEX: 32.86 KG/M2

## 2018-01-01 DIAGNOSIS — R00.0 TACHYCARDIA: Primary | ICD-10-CM

## 2018-01-01 DIAGNOSIS — R53.1 GENERALIZED WEAKNESS: ICD-10-CM

## 2018-01-01 DIAGNOSIS — R09.89 SYMPTOMS OF CEREBROVASCULAR ACCIDENT (CVA): ICD-10-CM

## 2018-01-01 DIAGNOSIS — R09.89 SYMPTOMS OF CEREBROVASCULAR ACCIDENT (CVA): Primary | ICD-10-CM

## 2018-01-01 DIAGNOSIS — R00.0 TACHYCARDIA: ICD-10-CM

## 2018-01-01 DIAGNOSIS — R13.12 OROPHARYNGEAL DYSPHAGIA: ICD-10-CM

## 2018-01-01 DIAGNOSIS — J42 CHRONIC BRONCHITIS, UNSPECIFIED CHRONIC BRONCHITIS TYPE (HCC): ICD-10-CM

## 2018-01-01 DIAGNOSIS — I63.412 CEREBROVASCULAR ACCIDENT (CVA) DUE TO EMBOLISM OF LEFT MIDDLE CEREBRAL ARTERY (HCC): Primary | ICD-10-CM

## 2018-01-01 DIAGNOSIS — I48.91 ATRIAL FIBRILLATION, UNSPECIFIED TYPE (HCC): Primary | ICD-10-CM

## 2018-01-01 DIAGNOSIS — I10 HYPERTENSION, POOR CONTROL: Primary | ICD-10-CM

## 2018-01-01 DIAGNOSIS — G47.33 OSA (OBSTRUCTIVE SLEEP APNEA): ICD-10-CM

## 2018-01-01 DIAGNOSIS — I50.22 CHRONIC SYSTOLIC CONGESTIVE HEART FAILURE (HCC): ICD-10-CM

## 2018-01-01 DIAGNOSIS — I44.7 LEFT BUNDLE BRANCH BLOCK: ICD-10-CM

## 2018-01-01 DIAGNOSIS — J44.9 CHRONIC OBSTRUCTIVE PULMONARY DISEASE, UNSPECIFIED COPD TYPE (HCC): ICD-10-CM

## 2018-01-01 DIAGNOSIS — I63.512 CEREBROVASCULAR ACCIDENT (CVA) DUE TO OCCLUSION OF LEFT MIDDLE CEREBRAL ARTERY (HCC): Primary | ICD-10-CM

## 2018-01-01 LAB
ALBUMIN SERPL-MCNC: 4 G/DL (ref 3.5–5.2)
ALBUMIN SERPL-MCNC: 4.2 G/DL (ref 3.5–5.2)
ALBUMIN/GLOB SERPL: 1.3 G/DL
ALBUMIN/GLOB SERPL: 1.4 G/DL
ALP SERPL-CCNC: 94 U/L (ref 39–117)
ALP SERPL-CCNC: 97 U/L (ref 39–117)
ALT SERPL W P-5'-P-CCNC: 17 U/L (ref 1–33)
ALT SERPL W P-5'-P-CCNC: 22 U/L (ref 1–33)
ANION GAP SERPL CALCULATED.3IONS-SCNC: 11.3 MMOL/L
ANION GAP SERPL CALCULATED.3IONS-SCNC: 12.1 MMOL/L
ANION GAP SERPL CALCULATED.3IONS-SCNC: 12.2 MMOL/L
ANION GAP SERPL CALCULATED.3IONS-SCNC: 13.3 MMOL/L
ANION GAP SERPL CALCULATED.3IONS-SCNC: 13.6 MMOL/L
ANION GAP SERPL CALCULATED.3IONS-SCNC: 13.7 MMOL/L
ANION GAP SERPL CALCULATED.3IONS-SCNC: 14.3 MMOL/L
ANION GAP SERPL CALCULATED.3IONS-SCNC: 14.8 MMOL/L
AORTIC ARCH: 1.5 CM
AORTIC DIMENSIONLESS INDEX: 0.6 (DI)
ASA PLATELET INHIBITION: 392 ARU
ASCENDING AORTA: 1.5 CM
AST SERPL-CCNC: 20 U/L (ref 1–32)
AST SERPL-CCNC: 27 U/L (ref 1–32)
BACTERIA UR QL AUTO: ABNORMAL /HPF
BASOPHILS # BLD AUTO: 0.02 10*3/MM3 (ref 0–0.2)
BASOPHILS # BLD AUTO: 0.02 10*3/MM3 (ref 0–0.2)
BASOPHILS # BLD AUTO: 0.04 10*3/MM3 (ref 0–0.2)
BASOPHILS # BLD AUTO: 0.07 10*3/MM3 (ref 0–0.2)
BASOPHILS NFR BLD AUTO: 0.2 % (ref 0–1.5)
BASOPHILS NFR BLD AUTO: 0.2 % (ref 0–1.5)
BASOPHILS NFR BLD AUTO: 0.5 % (ref 0–1.5)
BASOPHILS NFR BLD AUTO: 0.8 % (ref 0–1.5)
BH CV ECHO MEAS - ACS: 1.7 CM
BH CV ECHO MEAS - AI DEC SLOPE: 275 CM/SEC^2
BH CV ECHO MEAS - AI MAX PG: 55.7 MMHG
BH CV ECHO MEAS - AI MAX VEL: 373 CM/SEC
BH CV ECHO MEAS - AI P1/2T: 397.3 MSEC
BH CV ECHO MEAS - AO MAX PG (FULL): 8.4 MMHG
BH CV ECHO MEAS - AO MAX PG: 13.4 MMHG
BH CV ECHO MEAS - AO MEAN PG (FULL): 4 MMHG
BH CV ECHO MEAS - AO MEAN PG: 7 MMHG
BH CV ECHO MEAS - AO ROOT AREA (BSA CORRECTED): 1.5
BH CV ECHO MEAS - AO ROOT AREA: 5.7 CM^2
BH CV ECHO MEAS - AO ROOT DIAM: 2.7 CM
BH CV ECHO MEAS - AO V2 MAX: 183 CM/SEC
BH CV ECHO MEAS - AO V2 MEAN: 126 CM/SEC
BH CV ECHO MEAS - AO V2 VTI: 30 CM
BH CV ECHO MEAS - ASC AORTA: 1.5 CM
BH CV ECHO MEAS - AVA(I,A): 1.4 CM^2
BH CV ECHO MEAS - AVA(I,D): 1.4 CM^2
BH CV ECHO MEAS - AVA(V,A): 1.6 CM^2
BH CV ECHO MEAS - AVA(V,D): 1.6 CM^2
BH CV ECHO MEAS - BSA(HAYCOCK): 1.9 M^2
BH CV ECHO MEAS - BSA: 1.8 M^2
BH CV ECHO MEAS - BZI_BMI: 31.6 KILOGRAMS/M^2
BH CV ECHO MEAS - BZI_METRIC_HEIGHT: 157.5 CM
BH CV ECHO MEAS - BZI_METRIC_WEIGHT: 78.5 KG
BH CV ECHO MEAS - CONTRAST EF (2CH): 65.7 ML/M^2
BH CV ECHO MEAS - CONTRAST EF 4CH: 61.4 ML/M^2
BH CV ECHO MEAS - EDV(CUBED): 62.1 ML
BH CV ECHO MEAS - EDV(MOD-SP2): 35 ML
BH CV ECHO MEAS - EDV(MOD-SP4): 44 ML
BH CV ECHO MEAS - EDV(TEICH): 68.3 ML
BH CV ECHO MEAS - EF(CUBED): 59.9 %
BH CV ECHO MEAS - EF(MOD-BP): 65 %
BH CV ECHO MEAS - EF(MOD-SP2): 65.7 %
BH CV ECHO MEAS - EF(MOD-SP4): 61.4 %
BH CV ECHO MEAS - EF(TEICH): 52.1 %
BH CV ECHO MEAS - ESV(CUBED): 24.9 ML
BH CV ECHO MEAS - ESV(MOD-SP2): 12 ML
BH CV ECHO MEAS - ESV(MOD-SP4): 17 ML
BH CV ECHO MEAS - ESV(TEICH): 32.8 ML
BH CV ECHO MEAS - FS: 26.3 %
BH CV ECHO MEAS - IVS/LVPW: 0.94
BH CV ECHO MEAS - IVSD: 1 CM
BH CV ECHO MEAS - LV DIASTOLIC VOL/BSA (35-75): 24.5 ML/M^2
BH CV ECHO MEAS - LV MASS(C)D: 138.7 GRAMS
BH CV ECHO MEAS - LV MASS(C)DI: 77.2 GRAMS/M^2
BH CV ECHO MEAS - LV MAX PG: 5 MMHG
BH CV ECHO MEAS - LV MEAN PG: 3 MMHG
BH CV ECHO MEAS - LV SYSTOLIC VOL/BSA (12-30): 9.5 ML/M^2
BH CV ECHO MEAS - LV V1 MAX: 112 CM/SEC
BH CV ECHO MEAS - LV V1 MEAN: 80.2 CM/SEC
BH CV ECHO MEAS - LV V1 VTI: 16.6 CM
BH CV ECHO MEAS - LVIDD: 4 CM
BH CV ECHO MEAS - LVIDS: 2.9 CM
BH CV ECHO MEAS - LVLD AP2: 6.1 CM
BH CV ECHO MEAS - LVLD AP4: 7.1 CM
BH CV ECHO MEAS - LVLS AP2: 4.8 CM
BH CV ECHO MEAS - LVLS AP4: 5.7 CM
BH CV ECHO MEAS - LVOT AREA (M): 2.5 CM^2
BH CV ECHO MEAS - LVOT AREA: 2.5 CM^2
BH CV ECHO MEAS - LVOT DIAM: 1.8 CM
BH CV ECHO MEAS - LVPWD: 1.1 CM
BH CV ECHO MEAS - MV DEC SLOPE: 592 CM/SEC^2
BH CV ECHO MEAS - MV DEC TIME: 0.2 SEC
BH CV ECHO MEAS - MV E MAX VEL: 98.5 CM/SEC
BH CV ECHO MEAS - MV MAX PG: 3.3 MMHG
BH CV ECHO MEAS - MV MEAN PG: 2 MMHG
BH CV ECHO MEAS - MV P1/2T MAX VEL: 91.7 CM/SEC
BH CV ECHO MEAS - MV P1/2T: 45.4 MSEC
BH CV ECHO MEAS - MV V2 MAX: 90.4 CM/SEC
BH CV ECHO MEAS - MV V2 MEAN: 56.6 CM/SEC
BH CV ECHO MEAS - MV V2 VTI: 13.1 CM
BH CV ECHO MEAS - MVA P1/2T LCG: 2.4 CM^2
BH CV ECHO MEAS - MVA(P1/2T): 4.8 CM^2
BH CV ECHO MEAS - MVA(VTI): 3.2 CM^2
BH CV ECHO MEAS - PA ACC TIME: 0.1 SEC
BH CV ECHO MEAS - PA MAX PG (FULL): 3.5 MMHG
BH CV ECHO MEAS - PA MAX PG: 5.3 MMHG
BH CV ECHO MEAS - PA PR(ACCEL): 36.3 MMHG
BH CV ECHO MEAS - PA V2 MAX: 115 CM/SEC
BH CV ECHO MEAS - PULM A REVS DUR: 0.1 SEC
BH CV ECHO MEAS - PULM A REVS VEL: 21.2 CM/SEC
BH CV ECHO MEAS - PULM DIAS VEL: 49.7 CM/SEC
BH CV ECHO MEAS - PULM S/D: 0.43
BH CV ECHO MEAS - PULM SYS VEL: 21.2 CM/SEC
BH CV ECHO MEAS - PVA(V,A): 1 CM^2
BH CV ECHO MEAS - PVA(V,D): 1 CM^2
BH CV ECHO MEAS - QP/QS: 0.35
BH CV ECHO MEAS - RAP SYSTOLE: 15 MMHG
BH CV ECHO MEAS - RV MAX PG: 1.8 MMHG
BH CV ECHO MEAS - RV MEAN PG: 1 MMHG
BH CV ECHO MEAS - RV V1 MAX: 67.1 CM/SEC
BH CV ECHO MEAS - RV V1 MEAN: 43.9 CM/SEC
BH CV ECHO MEAS - RV V1 VTI: 8.4 CM
BH CV ECHO MEAS - RVOT AREA: 1.8 CM^2
BH CV ECHO MEAS - RVOT DIAM: 1.5 CM
BH CV ECHO MEAS - RVSP: 45 MMHG
BH CV ECHO MEAS - SI(AO): 95.6 ML/M^2
BH CV ECHO MEAS - SI(CUBED): 20.7 ML/M^2
BH CV ECHO MEAS - SI(LVOT): 23.5 ML/M^2
BH CV ECHO MEAS - SI(MOD-SP2): 12.8 ML/M^2
BH CV ECHO MEAS - SI(MOD-SP4): 15 ML/M^2
BH CV ECHO MEAS - SI(TEICH): 19.8 ML/M^2
BH CV ECHO MEAS - SUP REN AO DIAM: 2.5 CM
BH CV ECHO MEAS - SV(AO): 171.8 ML
BH CV ECHO MEAS - SV(CUBED): 37.2 ML
BH CV ECHO MEAS - SV(LVOT): 42.2 ML
BH CV ECHO MEAS - SV(MOD-SP2): 23 ML
BH CV ECHO MEAS - SV(MOD-SP4): 27 ML
BH CV ECHO MEAS - SV(RVOT): 14.9 ML
BH CV ECHO MEAS - SV(TEICH): 35.6 ML
BH CV ECHO MEAS - TAPSE (>1.6): 2 CM2
BH CV ECHO MEAS - TR MAX VEL: 272 CM/SEC
BH CV VAS BP RIGHT ARM: NORMAL MMHG
BH CV XLRA - RV BASE: 4 CM
BH CV XLRA - TDI S': 11.6 CM/SEC
BILIRUB SERPL-MCNC: 0.3 MG/DL (ref 0.1–1.2)
BILIRUB SERPL-MCNC: 0.5 MG/DL (ref 0.1–1.2)
BILIRUB UR QL STRIP: NEGATIVE
BUN BLD-MCNC: 15 MG/DL (ref 8–23)
BUN BLD-MCNC: 17 MG/DL (ref 8–23)
BUN BLD-MCNC: 19 MG/DL (ref 8–23)
BUN BLD-MCNC: 20 MG/DL (ref 8–23)
BUN BLD-MCNC: 23 MG/DL (ref 8–23)
BUN BLD-MCNC: 26 MG/DL (ref 8–23)
BUN BLD-MCNC: 27 MG/DL (ref 8–23)
BUN BLD-MCNC: 30 MG/DL (ref 8–23)
BUN/CREAT SERPL: 15.3 (ref 7–25)
BUN/CREAT SERPL: 15.6 (ref 7–25)
BUN/CREAT SERPL: 19.6 (ref 7–25)
BUN/CREAT SERPL: 19.8 (ref 7–25)
BUN/CREAT SERPL: 20.2 (ref 7–25)
BUN/CREAT SERPL: 21 (ref 7–25)
BUN/CREAT SERPL: 21.1 (ref 7–25)
BUN/CREAT SERPL: 24.3 (ref 7–25)
CALCIUM SPEC-SCNC: 7.6 MG/DL (ref 8.6–10.5)
CALCIUM SPEC-SCNC: 7.6 MG/DL (ref 8.6–10.5)
CALCIUM SPEC-SCNC: 7.9 MG/DL (ref 8.6–10.5)
CALCIUM SPEC-SCNC: 8 MG/DL (ref 8.6–10.5)
CALCIUM SPEC-SCNC: 8.1 MG/DL (ref 8.6–10.5)
CALCIUM SPEC-SCNC: 8.1 MG/DL (ref 8.6–10.5)
CALCIUM SPEC-SCNC: 8.5 MG/DL (ref 8.6–10.5)
CALCIUM SPEC-SCNC: 8.6 MG/DL (ref 8.6–10.5)
CHLORIDE SERPL-SCNC: 103 MMOL/L (ref 98–107)
CHLORIDE SERPL-SCNC: 104 MMOL/L (ref 98–107)
CHLORIDE SERPL-SCNC: 107 MMOL/L (ref 98–107)
CHLORIDE SERPL-SCNC: 107 MMOL/L (ref 98–107)
CHLORIDE SERPL-SCNC: 108 MMOL/L (ref 98–107)
CHLORIDE SERPL-SCNC: 99 MMOL/L (ref 98–107)
CHOLEST SERPL-MCNC: 107 MG/DL (ref 0–200)
CLARITY UR: CLEAR
CO2 SERPL-SCNC: 24.2 MMOL/L (ref 22–29)
CO2 SERPL-SCNC: 24.3 MMOL/L (ref 22–29)
CO2 SERPL-SCNC: 24.8 MMOL/L (ref 22–29)
CO2 SERPL-SCNC: 25.7 MMOL/L (ref 22–29)
CO2 SERPL-SCNC: 26.4 MMOL/L (ref 22–29)
CO2 SERPL-SCNC: 26.7 MMOL/L (ref 22–29)
CO2 SERPL-SCNC: 27.9 MMOL/L (ref 22–29)
CO2 SERPL-SCNC: 30.7 MMOL/L (ref 22–29)
COLOR UR: YELLOW
CREAT BLD-MCNC: 0.96 MG/DL (ref 0.57–1)
CREAT BLD-MCNC: 0.98 MG/DL (ref 0.57–1)
CREAT BLD-MCNC: 1.02 MG/DL (ref 0.57–1)
CREAT BLD-MCNC: 1.09 MG/DL (ref 0.57–1)
CREAT BLD-MCNC: 1.11 MG/DL (ref 0.57–1)
CREAT BLD-MCNC: 1.14 MG/DL (ref 0.57–1)
CREAT BLD-MCNC: 1.23 MG/DL (ref 0.57–1)
CREAT BLD-MCNC: 1.43 MG/DL (ref 0.57–1)
CREAT BLDA-MCNC: 0.9 MG/DL (ref 0.6–1.3)
D DIMER PPP FEU-MCNC: 1.17 MCGFEU/ML (ref 0–0.49)
DEPRECATED RDW RBC AUTO: 50.2 FL (ref 37–54)
DEPRECATED RDW RBC AUTO: 50.9 FL (ref 37–54)
DEPRECATED RDW RBC AUTO: 51.6 FL (ref 37–54)
DEPRECATED RDW RBC AUTO: 52 FL (ref 37–54)
DEPRECATED RDW RBC AUTO: 54.4 FL (ref 37–54)
DEPRECATED RDW RBC AUTO: 54.5 FL (ref 37–54)
EOSINOPHIL # BLD AUTO: 0.36 10*3/MM3 (ref 0–0.7)
EOSINOPHIL # BLD AUTO: 0.6 10*3/MM3 (ref 0–0.7)
EOSINOPHIL # BLD AUTO: 0.62 10*3/MM3 (ref 0–0.7)
EOSINOPHIL # BLD AUTO: 0.97 10*3/MM3 (ref 0–0.7)
EOSINOPHIL NFR BLD AUTO: 10.6 % (ref 0.3–6.2)
EOSINOPHIL NFR BLD AUTO: 4 % (ref 0.3–6.2)
EOSINOPHIL NFR BLD AUTO: 6.2 % (ref 0.3–6.2)
EOSINOPHIL NFR BLD AUTO: 6.8 % (ref 0.3–6.2)
ERYTHROCYTE [DISTWIDTH] IN BLOOD BY AUTOMATED COUNT: 15.7 % (ref 11.7–13)
ERYTHROCYTE [DISTWIDTH] IN BLOOD BY AUTOMATED COUNT: 15.8 % (ref 11.7–13)
ERYTHROCYTE [DISTWIDTH] IN BLOOD BY AUTOMATED COUNT: 15.8 % (ref 11.7–13)
ERYTHROCYTE [DISTWIDTH] IN BLOOD BY AUTOMATED COUNT: 16 % (ref 11.7–13)
ERYTHROCYTE [DISTWIDTH] IN BLOOD BY AUTOMATED COUNT: 16.4 % (ref 11.7–13)
ERYTHROCYTE [DISTWIDTH] IN BLOOD BY AUTOMATED COUNT: 16.8 % (ref 11.7–13)
GFR SERPL CREATININE-BSD FRML MDRD: 35 ML/MIN/1.73
GFR SERPL CREATININE-BSD FRML MDRD: 41 ML/MIN/1.73
GFR SERPL CREATININE-BSD FRML MDRD: 45 ML/MIN/1.73
GFR SERPL CREATININE-BSD FRML MDRD: 46 ML/MIN/1.73
GFR SERPL CREATININE-BSD FRML MDRD: 47 ML/MIN/1.73
GFR SERPL CREATININE-BSD FRML MDRD: 51 ML/MIN/1.73
GFR SERPL CREATININE-BSD FRML MDRD: 54 ML/MIN/1.73
GFR SERPL CREATININE-BSD FRML MDRD: 55 ML/MIN/1.73
GLOBULIN UR ELPH-MCNC: 2.9 GM/DL
GLOBULIN UR ELPH-MCNC: 3 GM/DL
GLUCOSE BLD-MCNC: 109 MG/DL (ref 65–99)
GLUCOSE BLD-MCNC: 113 MG/DL (ref 65–99)
GLUCOSE BLD-MCNC: 113 MG/DL (ref 65–99)
GLUCOSE BLD-MCNC: 134 MG/DL (ref 65–99)
GLUCOSE BLD-MCNC: 136 MG/DL (ref 65–99)
GLUCOSE BLD-MCNC: 141 MG/DL (ref 65–99)
GLUCOSE BLD-MCNC: 87 MG/DL (ref 65–99)
GLUCOSE BLD-MCNC: 93 MG/DL (ref 65–99)
GLUCOSE BLDC GLUCOMTR-MCNC: 105 MG/DL (ref 70–130)
GLUCOSE BLDC GLUCOMTR-MCNC: 119 MG/DL (ref 70–130)
GLUCOSE UR STRIP-MCNC: NEGATIVE MG/DL
HBA1C MFR BLD: 5.9 % (ref 4.8–5.6)
HCT VFR BLD AUTO: 34.3 % (ref 35.6–45.5)
HCT VFR BLD AUTO: 34.4 % (ref 35.6–45.5)
HCT VFR BLD AUTO: 34.8 % (ref 35.6–45.5)
HCT VFR BLD AUTO: 37.8 % (ref 35.6–45.5)
HCT VFR BLD AUTO: 37.8 % (ref 35.6–45.5)
HCT VFR BLD AUTO: 40.8 % (ref 35.6–45.5)
HDLC SERPL-MCNC: 49 MG/DL (ref 40–60)
HGB BLD-MCNC: 10.8 G/DL (ref 11.9–15.5)
HGB BLD-MCNC: 10.8 G/DL (ref 11.9–15.5)
HGB BLD-MCNC: 11 G/DL (ref 11.9–15.5)
HGB BLD-MCNC: 11.9 G/DL (ref 11.9–15.5)
HGB BLD-MCNC: 12.4 G/DL (ref 11.9–15.5)
HGB BLD-MCNC: 13 G/DL (ref 11.9–15.5)
HGB UR QL STRIP.AUTO: NEGATIVE
HOLD SPECIMEN: NORMAL
HOLD SPECIMEN: NORMAL
HYALINE CASTS UR QL AUTO: ABNORMAL /LPF
IMM GRANULOCYTES # BLD: 0 10*3/MM3 (ref 0–0.03)
IMM GRANULOCYTES # BLD: 0 10*3/MM3 (ref 0–0.03)
IMM GRANULOCYTES # BLD: 0.02 10*3/MM3 (ref 0–0.03)
IMM GRANULOCYTES # BLD: 0.02 10*3/MM3 (ref 0–0.03)
IMM GRANULOCYTES NFR BLD: 0 % (ref 0–0.5)
IMM GRANULOCYTES NFR BLD: 0 % (ref 0–0.5)
IMM GRANULOCYTES NFR BLD: 0.2 % (ref 0–0.5)
IMM GRANULOCYTES NFR BLD: 0.2 % (ref 0–0.5)
INR PPP: 1.11 (ref 0.9–1.1)
KETONES UR QL STRIP: NEGATIVE
LDLC SERPL CALC-MCNC: 37 MG/DL (ref 0–100)
LDLC/HDLC SERPL: 0.75 {RATIO}
LEFT ATRIUM VOLUME INDEX: 24 ML/M2
LEUKOCYTE ESTERASE UR QL STRIP.AUTO: ABNORMAL
LYMPHOCYTES # BLD AUTO: 0.74 10*3/MM3 (ref 0.9–4.8)
LYMPHOCYTES # BLD AUTO: 0.95 10*3/MM3 (ref 0.9–4.8)
LYMPHOCYTES # BLD AUTO: 1.02 10*3/MM3 (ref 0.9–4.8)
LYMPHOCYTES # BLD AUTO: 1.62 10*3/MM3 (ref 0.9–4.8)
LYMPHOCYTES NFR BLD AUTO: 10.5 % (ref 19.6–45.3)
LYMPHOCYTES NFR BLD AUTO: 11.6 % (ref 19.6–45.3)
LYMPHOCYTES NFR BLD AUTO: 17.8 % (ref 19.6–45.3)
LYMPHOCYTES NFR BLD AUTO: 7.4 % (ref 19.6–45.3)
MAGNESIUM SERPL-MCNC: 2.1 MG/DL (ref 1.6–2.4)
MAXIMAL PREDICTED HEART RATE: 132 BPM
MCH RBC QN AUTO: 28.1 PG (ref 26.9–32)
MCH RBC QN AUTO: 28.1 PG (ref 26.9–32)
MCH RBC QN AUTO: 28.2 PG (ref 26.9–32)
MCH RBC QN AUTO: 28.3 PG (ref 26.9–32)
MCH RBC QN AUTO: 28.4 PG (ref 26.9–32)
MCH RBC QN AUTO: 28.8 PG (ref 26.9–32)
MCHC RBC AUTO-ENTMCNC: 31 G/DL (ref 32.4–36.3)
MCHC RBC AUTO-ENTMCNC: 31.4 G/DL (ref 32.4–36.3)
MCHC RBC AUTO-ENTMCNC: 31.5 G/DL (ref 32.4–36.3)
MCHC RBC AUTO-ENTMCNC: 31.9 G/DL (ref 32.4–36.3)
MCHC RBC AUTO-ENTMCNC: 32.1 G/DL (ref 32.4–36.3)
MCHC RBC AUTO-ENTMCNC: 32.8 G/DL (ref 32.4–36.3)
MCV RBC AUTO: 87.7 FL (ref 80.5–98.2)
MCV RBC AUTO: 88.6 FL (ref 80.5–98.2)
MCV RBC AUTO: 88.7 FL (ref 80.5–98.2)
MCV RBC AUTO: 89.6 FL (ref 80.5–98.2)
MCV RBC AUTO: 89.6 FL (ref 80.5–98.2)
MCV RBC AUTO: 90.4 FL (ref 80.5–98.2)
MONOCYTES # BLD AUTO: 0.7 10*3/MM3 (ref 0.2–1.2)
MONOCYTES # BLD AUTO: 0.97 10*3/MM3 (ref 0.2–1.2)
MONOCYTES # BLD AUTO: 1.15 10*3/MM3 (ref 0.2–1.2)
MONOCYTES # BLD AUTO: 1.33 10*3/MM3 (ref 0.2–1.2)
MONOCYTES NFR BLD AUTO: 12.6 % (ref 5–12)
MONOCYTES NFR BLD AUTO: 14.8 % (ref 5–12)
MONOCYTES NFR BLD AUTO: 8 % (ref 5–12)
MONOCYTES NFR BLD AUTO: 9.7 % (ref 5–12)
NEUTROPHILS # BLD AUTO: 5.31 10*3/MM3 (ref 1.9–8.1)
NEUTROPHILS # BLD AUTO: 6.33 10*3/MM3 (ref 1.9–8.1)
NEUTROPHILS # BLD AUTO: 6.42 10*3/MM3 (ref 1.9–8.1)
NEUTROPHILS # BLD AUTO: 7.61 10*3/MM3 (ref 1.9–8.1)
NEUTROPHILS NFR BLD AUTO: 58.2 % (ref 42.7–76)
NEUTROPHILS NFR BLD AUTO: 70.3 % (ref 42.7–76)
NEUTROPHILS NFR BLD AUTO: 73.1 % (ref 42.7–76)
NEUTROPHILS NFR BLD AUTO: 76.5 % (ref 42.7–76)
NITRITE UR QL STRIP: NEGATIVE
NT-PROBNP SERPL-MCNC: 3895 PG/ML (ref 0–1800)
NT-PROBNP SERPL-MCNC: 5100 PG/ML (ref 0–1800)
PA ADP PRP-ACNC: 195 PRU (ref 194–418)
PH UR STRIP.AUTO: <=5 [PH] (ref 5–8)
PLATELET # BLD AUTO: 141 10*3/MM3 (ref 140–500)
PLATELET # BLD AUTO: 158 10*3/MM3 (ref 140–500)
PLATELET # BLD AUTO: 169 10*3/MM3 (ref 140–500)
PLATELET # BLD AUTO: 182 10*3/MM3 (ref 140–500)
PLATELET # BLD AUTO: 185 10*3/MM3 (ref 140–500)
PLATELET # BLD AUTO: 200 10*3/MM3 (ref 140–500)
PMV BLD AUTO: 11.5 FL (ref 6–12)
PMV BLD AUTO: 11.7 FL (ref 6–12)
PMV BLD AUTO: 12 FL (ref 6–12)
PMV BLD AUTO: 12.2 FL (ref 6–12)
PMV BLD AUTO: 12.2 FL (ref 6–12)
PMV BLD AUTO: 12.4 FL (ref 6–12)
POTASSIUM BLD-SCNC: 3.4 MMOL/L (ref 3.5–5.2)
POTASSIUM BLD-SCNC: 3.5 MMOL/L (ref 3.5–5.2)
POTASSIUM BLD-SCNC: 3.5 MMOL/L (ref 3.5–5.2)
POTASSIUM BLD-SCNC: 3.6 MMOL/L (ref 3.5–5.2)
POTASSIUM BLD-SCNC: 3.7 MMOL/L (ref 3.5–5.2)
POTASSIUM BLD-SCNC: 3.8 MMOL/L (ref 3.5–5.2)
POTASSIUM BLD-SCNC: 4 MMOL/L (ref 3.5–5.2)
POTASSIUM BLD-SCNC: 4.4 MMOL/L (ref 3.5–5.2)
PROT SERPL-MCNC: 7 G/DL (ref 6–8.5)
PROT SERPL-MCNC: 7.1 G/DL (ref 6–8.5)
PROT UR QL STRIP: NEGATIVE
PROTHROMBIN TIME: 14.1 SECONDS (ref 11.7–14.2)
RBC # BLD AUTO: 3.84 10*6/MM3 (ref 3.9–5.2)
RBC # BLD AUTO: 3.85 10*6/MM3 (ref 3.9–5.2)
RBC # BLD AUTO: 3.87 10*6/MM3 (ref 3.9–5.2)
RBC # BLD AUTO: 4.22 10*6/MM3 (ref 3.9–5.2)
RBC # BLD AUTO: 4.31 10*6/MM3 (ref 3.9–5.2)
RBC # BLD AUTO: 4.6 10*6/MM3 (ref 3.9–5.2)
RBC # UR: ABNORMAL /HPF
REF LAB TEST METHOD: ABNORMAL
SODIUM BLD-SCNC: 141 MMOL/L (ref 136–145)
SODIUM BLD-SCNC: 141 MMOL/L (ref 136–145)
SODIUM BLD-SCNC: 143 MMOL/L (ref 136–145)
SODIUM BLD-SCNC: 143 MMOL/L (ref 136–145)
SODIUM BLD-SCNC: 144 MMOL/L (ref 136–145)
SODIUM BLD-SCNC: 144 MMOL/L (ref 136–145)
SODIUM BLD-SCNC: 148 MMOL/L (ref 136–145)
SODIUM BLD-SCNC: 148 MMOL/L (ref 136–145)
SP GR UR STRIP: 1.01 (ref 1–1.03)
SQUAMOUS #/AREA URNS HPF: ABNORMAL /HPF
STRESS TARGET HR: 112 BPM
TRIGL SERPL-MCNC: 106 MG/DL (ref 0–150)
TROPONIN T SERPL-MCNC: <0.01 NG/ML (ref 0–0.03)
TSH SERPL DL<=0.05 MIU/L-ACNC: 2.98 MIU/ML (ref 0.27–4.2)
UROBILINOGEN UR QL STRIP: ABNORMAL
VLDLC SERPL-MCNC: 21.2 MG/DL (ref 5–40)
WBC NRBC COR # BLD: 11.4 10*3/MM3 (ref 4.5–10.7)
WBC NRBC COR # BLD: 8.78 10*3/MM3 (ref 4.5–10.7)
WBC NRBC COR # BLD: 9.01 10*3/MM3 (ref 4.5–10.7)
WBC NRBC COR # BLD: 9.01 10*3/MM3 (ref 4.5–10.7)
WBC NRBC COR # BLD: 9.12 10*3/MM3 (ref 4.5–10.7)
WBC NRBC COR # BLD: 9.96 10*3/MM3 (ref 4.5–10.7)
WBC UR QL AUTO: ABNORMAL /HPF
WHOLE BLOOD HOLD SPECIMEN: NORMAL
WHOLE BLOOD HOLD SPECIMEN: NORMAL

## 2018-01-01 PROCEDURE — 94799 UNLISTED PULMONARY SVC/PX: CPT

## 2018-01-01 PROCEDURE — 97165 OT EVAL LOW COMPLEX 30 MIN: CPT

## 2018-01-01 PROCEDURE — 99233 SBSQ HOSP IP/OBS HIGH 50: CPT | Performed by: NURSE PRACTITIONER

## 2018-01-01 PROCEDURE — 92523 SPEECH SOUND LANG COMPREHEN: CPT | Performed by: SPEECH-LANGUAGE PATHOLOGIST

## 2018-01-01 PROCEDURE — 93306 TTE W/DOPPLER COMPLETE: CPT | Performed by: INTERNAL MEDICINE

## 2018-01-01 PROCEDURE — 80048 BASIC METABOLIC PNL TOTAL CA: CPT | Performed by: INTERNAL MEDICINE

## 2018-01-01 PROCEDURE — 85025 COMPLETE CBC W/AUTO DIFF WBC: CPT | Performed by: NURSE PRACTITIONER

## 2018-01-01 PROCEDURE — 92610 EVALUATE SWALLOWING FUNCTION: CPT

## 2018-01-01 PROCEDURE — 83735 ASSAY OF MAGNESIUM: CPT | Performed by: NURSE PRACTITIONER

## 2018-01-01 PROCEDURE — 70496 CT ANGIOGRAPHY HEAD: CPT

## 2018-01-01 PROCEDURE — 97110 THERAPEUTIC EXERCISES: CPT

## 2018-01-01 PROCEDURE — 99232 SBSQ HOSP IP/OBS MODERATE 35: CPT | Performed by: NURSE PRACTITIONER

## 2018-01-01 PROCEDURE — 97110 THERAPEUTIC EXERCISES: CPT | Performed by: OCCUPATIONAL THERAPIST

## 2018-01-01 PROCEDURE — 85027 COMPLETE CBC AUTOMATED: CPT | Performed by: INTERNAL MEDICINE

## 2018-01-01 PROCEDURE — 80061 LIPID PANEL: CPT | Performed by: PSYCHIATRY & NEUROLOGY

## 2018-01-01 PROCEDURE — 81001 URINALYSIS AUTO W/SCOPE: CPT | Performed by: NURSE PRACTITIONER

## 2018-01-01 PROCEDURE — 82565 ASSAY OF CREATININE: CPT

## 2018-01-01 PROCEDURE — 85025 COMPLETE CBC W/AUTO DIFF WBC: CPT | Performed by: INTERNAL MEDICINE

## 2018-01-01 PROCEDURE — 83036 HEMOGLOBIN GLYCOSYLATED A1C: CPT | Performed by: PSYCHIATRY & NEUROLOGY

## 2018-01-01 PROCEDURE — 99214 OFFICE O/P EST MOD 30 MIN: CPT | Performed by: INTERNAL MEDICINE

## 2018-01-01 PROCEDURE — 93005 ELECTROCARDIOGRAM TRACING: CPT

## 2018-01-01 PROCEDURE — 99232 SBSQ HOSP IP/OBS MODERATE 35: CPT | Performed by: INTERNAL MEDICINE

## 2018-01-01 PROCEDURE — 83880 ASSAY OF NATRIURETIC PEPTIDE: CPT | Performed by: INTERNAL MEDICINE

## 2018-01-01 PROCEDURE — 80053 COMPREHEN METABOLIC PANEL: CPT | Performed by: EMERGENCY MEDICINE

## 2018-01-01 PROCEDURE — 0 IOPAMIDOL PER 1 ML: Performed by: EMERGENCY MEDICINE

## 2018-01-01 PROCEDURE — 71045 X-RAY EXAM CHEST 1 VIEW: CPT

## 2018-01-01 PROCEDURE — 94762 N-INVAS EAR/PLS OXIMTRY CONT: CPT

## 2018-01-01 PROCEDURE — 93010 ELECTROCARDIOGRAM REPORT: CPT | Performed by: INTERNAL MEDICINE

## 2018-01-01 PROCEDURE — 97535 SELF CARE MNGMENT TRAINING: CPT | Performed by: OCCUPATIONAL THERAPIST

## 2018-01-01 PROCEDURE — 0042T HC CT CEREBRAL PERFUSION W/WO CONTRAST: CPT

## 2018-01-01 PROCEDURE — 99222 1ST HOSP IP/OBS MODERATE 55: CPT | Performed by: PSYCHIATRY & NEUROLOGY

## 2018-01-01 PROCEDURE — 74230 X-RAY XM SWLNG FUNCJ C+: CPT

## 2018-01-01 PROCEDURE — 82962 GLUCOSE BLOOD TEST: CPT

## 2018-01-01 PROCEDURE — 92526 ORAL FUNCTION THERAPY: CPT | Performed by: SPEECH-LANGUAGE PATHOLOGIST

## 2018-01-01 PROCEDURE — 70498 CT ANGIOGRAPHY NECK: CPT

## 2018-01-01 PROCEDURE — 83880 ASSAY OF NATRIURETIC PEPTIDE: CPT | Performed by: NURSE PRACTITIONER

## 2018-01-01 PROCEDURE — 99222 1ST HOSP IP/OBS MODERATE 55: CPT | Performed by: INTERNAL MEDICINE

## 2018-01-01 PROCEDURE — 85576 BLOOD PLATELET AGGREGATION: CPT | Performed by: EMERGENCY MEDICINE

## 2018-01-01 PROCEDURE — 85025 COMPLETE CBC W/AUTO DIFF WBC: CPT | Performed by: EMERGENCY MEDICINE

## 2018-01-01 PROCEDURE — 93005 ELECTROCARDIOGRAM TRACING: CPT | Performed by: INTERNAL MEDICINE

## 2018-01-01 PROCEDURE — 99284 EMERGENCY DEPT VISIT MOD MDM: CPT

## 2018-01-01 PROCEDURE — 93227 XTRNL ECG REC<48 HR R&I: CPT | Performed by: INTERNAL MEDICINE

## 2018-01-01 PROCEDURE — 85379 FIBRIN DEGRADATION QUANT: CPT | Performed by: NURSE PRACTITIONER

## 2018-01-01 PROCEDURE — 84484 ASSAY OF TROPONIN QUANT: CPT | Performed by: NURSE PRACTITIONER

## 2018-01-01 PROCEDURE — 99285 EMERGENCY DEPT VISIT HI MDM: CPT

## 2018-01-01 PROCEDURE — 97161 PT EVAL LOW COMPLEX 20 MIN: CPT | Performed by: PHYSICAL THERAPIST

## 2018-01-01 PROCEDURE — 84443 ASSAY THYROID STIM HORMONE: CPT | Performed by: NURSE PRACTITIONER

## 2018-01-01 PROCEDURE — 93225 XTRNL ECG REC<48 HRS REC: CPT

## 2018-01-01 PROCEDURE — 93306 TTE W/DOPPLER COMPLETE: CPT

## 2018-01-01 PROCEDURE — 97535 SELF CARE MNGMENT TRAINING: CPT

## 2018-01-01 PROCEDURE — 93005 ELECTROCARDIOGRAM TRACING: CPT | Performed by: EMERGENCY MEDICINE

## 2018-01-01 PROCEDURE — 80053 COMPREHEN METABOLIC PANEL: CPT | Performed by: NURSE PRACTITIONER

## 2018-01-01 PROCEDURE — 92611 MOTION FLUOROSCOPY/SWALLOW: CPT | Performed by: SPEECH-LANGUAGE PATHOLOGIST

## 2018-01-01 PROCEDURE — 96374 THER/PROPH/DIAG INJ IV PUSH: CPT

## 2018-01-01 PROCEDURE — 85610 PROTHROMBIN TIME: CPT | Performed by: EMERGENCY MEDICINE

## 2018-01-01 PROCEDURE — 70551 MRI BRAIN STEM W/O DYE: CPT

## 2018-01-01 PROCEDURE — 94640 AIRWAY INHALATION TREATMENT: CPT

## 2018-01-01 PROCEDURE — 93226 XTRNL ECG REC<48 HR SCAN A/R: CPT

## 2018-01-01 PROCEDURE — 93000 ELECTROCARDIOGRAM COMPLETE: CPT | Performed by: INTERNAL MEDICINE

## 2018-01-01 PROCEDURE — 70450 CT HEAD/BRAIN W/O DYE: CPT

## 2018-01-01 RX ORDER — ASPIRIN 81 MG/1
81 TABLET ORAL DAILY
Qty: 150 TABLET | Refills: 2 | Status: SHIPPED | OUTPATIENT
Start: 2018-01-01

## 2018-01-01 RX ORDER — ONDANSETRON 4 MG/1
4 TABLET, FILM COATED ORAL EVERY 6 HOURS PRN
Status: DISCONTINUED | OUTPATIENT
Start: 2018-01-01 | End: 2018-01-01 | Stop reason: HOSPADM

## 2018-01-01 RX ORDER — ONDANSETRON 2 MG/ML
4 INJECTION INTRAMUSCULAR; INTRAVENOUS EVERY 6 HOURS PRN
Status: DISCONTINUED | OUTPATIENT
Start: 2018-01-01 | End: 2018-01-01 | Stop reason: HOSPADM

## 2018-01-01 RX ORDER — FUROSEMIDE 20 MG/1
TABLET ORAL
Qty: 60 TABLET | Refills: 3 | Status: SHIPPED | OUTPATIENT
Start: 2018-01-01 | End: 2018-01-01 | Stop reason: SDUPTHER

## 2018-01-01 RX ORDER — MELATONIN
2000 EVERY MORNING
Status: DISCONTINUED | OUTPATIENT
Start: 2018-01-01 | End: 2018-01-01 | Stop reason: HOSPADM

## 2018-01-01 RX ORDER — ASPIRIN 300 MG/1
300 SUPPOSITORY RECTAL DAILY
Status: DISCONTINUED | OUTPATIENT
Start: 2018-01-01 | End: 2018-01-01 | Stop reason: HOSPADM

## 2018-01-01 RX ORDER — ZOLPIDEM TARTRATE 5 MG/1
TABLET ORAL
Qty: 2 TABLET | Refills: 0 | Status: SHIPPED | OUTPATIENT
Start: 2018-01-01 | End: 2018-01-01 | Stop reason: HOSPADM

## 2018-01-01 RX ORDER — PRAVASTATIN SODIUM 40 MG
40 TABLET ORAL NIGHTLY
COMMUNITY
End: 2018-01-01 | Stop reason: HOSPADM

## 2018-01-01 RX ORDER — UREA 10 %
1250 LOTION (ML) TOPICAL DAILY
Status: DISCONTINUED | OUTPATIENT
Start: 2018-01-01 | End: 2018-01-01 | Stop reason: CLARIF

## 2018-01-01 RX ORDER — ASPIRIN 300 MG/1
300 SUPPOSITORY RECTAL ONCE
Status: DISCONTINUED | OUTPATIENT
Start: 2018-01-01 | End: 2018-01-01

## 2018-01-01 RX ORDER — ASPIRIN 300 MG/1
300 SUPPOSITORY RECTAL ONCE
Status: COMPLETED | OUTPATIENT
Start: 2018-01-01 | End: 2018-01-01

## 2018-01-01 RX ORDER — CLOPIDOGREL BISULFATE 75 MG/1
TABLET ORAL
Qty: 30 TABLET | Refills: 0 | OUTPATIENT
Start: 2018-01-01 | End: 2018-01-01 | Stop reason: HOSPADM

## 2018-01-01 RX ORDER — CARVEDILOL 6.25 MG/1
6.25 TABLET ORAL ONCE
Status: COMPLETED | OUTPATIENT
Start: 2018-01-01 | End: 2018-01-01

## 2018-01-01 RX ORDER — IPRATROPIUM BROMIDE AND ALBUTEROL SULFATE 2.5; .5 MG/3ML; MG/3ML
3 SOLUTION RESPIRATORY (INHALATION)
Status: DISCONTINUED | OUTPATIENT
Start: 2018-01-01 | End: 2018-01-01

## 2018-01-01 RX ORDER — CARVEDILOL 12.5 MG/1
12.5 TABLET ORAL 2 TIMES DAILY WITH MEALS
Status: DISCONTINUED | OUTPATIENT
Start: 2018-01-01 | End: 2018-01-01 | Stop reason: HOSPADM

## 2018-01-01 RX ORDER — CARVEDILOL 3.12 MG/1
6.25 TABLET ORAL EVERY MORNING
COMMUNITY
End: 2018-01-01 | Stop reason: HOSPADM

## 2018-01-01 RX ORDER — CARVEDILOL 3.12 MG/1
3.12 TABLET ORAL EVERY EVENING
COMMUNITY
End: 2018-01-01 | Stop reason: HOSPADM

## 2018-01-01 RX ORDER — CLOPIDOGREL BISULFATE 75 MG/1
75 TABLET ORAL DAILY
Status: DISCONTINUED | OUTPATIENT
Start: 2018-01-01 | End: 2018-01-01 | Stop reason: HOSPADM

## 2018-01-01 RX ORDER — POTASSIUM CHLORIDE 7.45 MG/ML
10 INJECTION INTRAVENOUS
Status: DISCONTINUED | OUTPATIENT
Start: 2018-01-01 | End: 2018-01-01 | Stop reason: HOSPADM

## 2018-01-01 RX ORDER — ESCITALOPRAM OXALATE 10 MG/1
TABLET ORAL
Qty: 30 TABLET | Refills: 3 | Status: SHIPPED | OUTPATIENT
Start: 2018-01-01 | End: 2018-01-01 | Stop reason: SDUPTHER

## 2018-01-01 RX ORDER — ATORVASTATIN CALCIUM 80 MG/1
80 TABLET, FILM COATED ORAL NIGHTLY
Start: 2018-01-01

## 2018-01-01 RX ORDER — AMIODARONE HYDROCHLORIDE 200 MG/1
200 TABLET ORAL
Status: DISCONTINUED | OUTPATIENT
Start: 2018-01-01 | End: 2018-01-01 | Stop reason: HOSPADM

## 2018-01-01 RX ORDER — ZAFIRLUKAST 20 MG/1
20 TABLET, FILM COATED ORAL DAILY PRN
COMMUNITY

## 2018-01-01 RX ORDER — SODIUM CHLORIDE 0.9 % (FLUSH) 0.9 %
1-10 SYRINGE (ML) INJECTION AS NEEDED
Status: DISCONTINUED | OUTPATIENT
Start: 2018-01-01 | End: 2018-01-01 | Stop reason: HOSPADM

## 2018-01-01 RX ORDER — FUROSEMIDE 20 MG/1
20 TABLET ORAL EVERY MORNING
COMMUNITY

## 2018-01-01 RX ORDER — LEVOTHYROXINE SODIUM 0.05 MG/1
50 TABLET ORAL DAILY
COMMUNITY

## 2018-01-01 RX ORDER — POTASSIUM CHLORIDE 750 MG/1
40 CAPSULE, EXTENDED RELEASE ORAL AS NEEDED
Status: DISCONTINUED | OUTPATIENT
Start: 2018-01-01 | End: 2018-01-01 | Stop reason: HOSPADM

## 2018-01-01 RX ORDER — LEVOTHYROXINE SODIUM 0.05 MG/1
TABLET ORAL
Qty: 30 TABLET | Refills: 1 | Status: SHIPPED | OUTPATIENT
Start: 2018-01-01 | End: 2018-01-01

## 2018-01-01 RX ORDER — CALCIUM CARBONATE 200(500)MG
1 TABLET,CHEWABLE ORAL 2 TIMES DAILY PRN
Status: DISCONTINUED | OUTPATIENT
Start: 2018-01-01 | End: 2018-01-01 | Stop reason: HOSPADM

## 2018-01-01 RX ORDER — FUROSEMIDE 20 MG/1
20 TABLET ORAL EVERY MORNING
Status: DISCONTINUED | OUTPATIENT
Start: 2018-01-01 | End: 2018-01-01 | Stop reason: HOSPADM

## 2018-01-01 RX ORDER — CETIRIZINE HYDROCHLORIDE 10 MG/1
5 TABLET ORAL DAILY
Status: DISCONTINUED | OUTPATIENT
Start: 2018-01-01 | End: 2018-01-01 | Stop reason: HOSPADM

## 2018-01-01 RX ORDER — IPRATROPIUM BROMIDE AND ALBUTEROL SULFATE 2.5; .5 MG/3ML; MG/3ML
3 SOLUTION RESPIRATORY (INHALATION) EVERY 6 HOURS PRN
Status: DISCONTINUED | OUTPATIENT
Start: 2018-01-01 | End: 2018-01-01

## 2018-01-01 RX ORDER — CALCIUM CARBONATE 200(500)MG
2 TABLET,CHEWABLE ORAL DAILY
Status: DISCONTINUED | OUTPATIENT
Start: 2018-01-01 | End: 2018-01-01 | Stop reason: HOSPADM

## 2018-01-01 RX ORDER — LEVOTHYROXINE SODIUM 0.05 MG/1
50 TABLET ORAL DAILY
Status: DISCONTINUED | OUTPATIENT
Start: 2018-01-01 | End: 2018-01-01 | Stop reason: HOSPADM

## 2018-01-01 RX ORDER — BISACODYL 10 MG
10 SUPPOSITORY, RECTAL RECTAL DAILY PRN
Status: DISCONTINUED | OUTPATIENT
Start: 2018-01-01 | End: 2018-01-01 | Stop reason: HOSPADM

## 2018-01-01 RX ORDER — AZELASTINE 1 MG/ML
1 SPRAY, METERED NASAL DAILY
Status: DISCONTINUED | OUTPATIENT
Start: 2018-01-01 | End: 2018-01-01 | Stop reason: HOSPADM

## 2018-01-01 RX ORDER — PRAVASTATIN SODIUM 40 MG
TABLET ORAL
Qty: 30 TABLET | Refills: 3 | Status: SHIPPED | OUTPATIENT
Start: 2018-01-01 | End: 2018-01-01

## 2018-01-01 RX ORDER — SODIUM CHLORIDE 0.9 % (FLUSH) 0.9 %
10 SYRINGE (ML) INJECTION AS NEEDED
Status: DISCONTINUED | OUTPATIENT
Start: 2018-01-01 | End: 2018-01-01 | Stop reason: HOSPADM

## 2018-01-01 RX ORDER — SODIUM CHLORIDE 9 MG/ML
50 INJECTION, SOLUTION INTRAVENOUS CONTINUOUS
Status: DISCONTINUED | OUTPATIENT
Start: 2018-01-01 | End: 2018-01-01

## 2018-01-01 RX ORDER — IPRATROPIUM BROMIDE 21 UG/1
1 SPRAY, METERED NASAL DAILY
Status: DISCONTINUED | OUTPATIENT
Start: 2018-01-01 | End: 2018-01-01 | Stop reason: HOSPADM

## 2018-01-01 RX ORDER — CARVEDILOL 3.12 MG/1
3.12 TABLET ORAL EVERY EVENING
Status: DISCONTINUED | OUTPATIENT
Start: 2018-01-01 | End: 2018-01-01

## 2018-01-01 RX ORDER — ESCITALOPRAM OXALATE 10 MG/1
10 TABLET ORAL NIGHTLY
Status: DISCONTINUED | OUTPATIENT
Start: 2018-01-01 | End: 2018-01-01 | Stop reason: HOSPADM

## 2018-01-01 RX ORDER — FUROSEMIDE 20 MG/1
10 TABLET ORAL EVERY EVENING
COMMUNITY
End: 2018-01-01 | Stop reason: HOSPADM

## 2018-01-01 RX ORDER — CARVEDILOL 3.12 MG/1
3.12 TABLET ORAL SEE ADMIN INSTRUCTIONS
Qty: 60 TABLET | Refills: 0 | Status: SHIPPED | OUTPATIENT
Start: 2018-01-01 | End: 2018-01-01

## 2018-01-01 RX ORDER — FLUTICASONE PROPIONATE 50 MCG
1 SPRAY, SUSPENSION (ML) NASAL DAILY
Status: DISCONTINUED | OUTPATIENT
Start: 2018-01-01 | End: 2018-01-01 | Stop reason: HOSPADM

## 2018-01-01 RX ORDER — ALBUTEROL SULFATE 2.5 MG/3ML
2.5 SOLUTION RESPIRATORY (INHALATION) EVERY 6 HOURS PRN
Refills: 12
Start: 2018-01-01

## 2018-01-01 RX ORDER — ALBUTEROL SULFATE 2.5 MG/3ML
2.5 SOLUTION RESPIRATORY (INHALATION) EVERY 6 HOURS PRN
Status: DISCONTINUED | OUTPATIENT
Start: 2018-01-01 | End: 2018-01-01 | Stop reason: HOSPADM

## 2018-01-01 RX ORDER — CLOPIDOGREL BISULFATE 75 MG/1
75 TABLET ORAL DAILY
COMMUNITY
End: 2018-01-01

## 2018-01-01 RX ORDER — IPRATROPIUM BROMIDE 21 UG/1
1 SPRAY, METERED NASAL DAILY
COMMUNITY
End: 2018-01-01 | Stop reason: HOSPADM

## 2018-01-01 RX ORDER — POTASSIUM CHLORIDE 1.5 G/1.77G
40 POWDER, FOR SOLUTION ORAL AS NEEDED
Status: DISCONTINUED | OUTPATIENT
Start: 2018-01-01 | End: 2018-01-01 | Stop reason: HOSPADM

## 2018-01-01 RX ORDER — BUDESONIDE AND FORMOTEROL FUMARATE DIHYDRATE 160; 4.5 UG/1; UG/1
2 AEROSOL RESPIRATORY (INHALATION)
Status: DISCONTINUED | OUTPATIENT
Start: 2018-01-01 | End: 2018-01-01 | Stop reason: HOSPADM

## 2018-01-01 RX ORDER — ASPIRIN 325 MG
325 TABLET ORAL DAILY
Status: DISCONTINUED | OUTPATIENT
Start: 2018-01-01 | End: 2018-01-01 | Stop reason: HOSPADM

## 2018-01-01 RX ORDER — ATORVASTATIN CALCIUM 80 MG/1
80 TABLET, FILM COATED ORAL NIGHTLY
Status: DISCONTINUED | OUTPATIENT
Start: 2018-01-01 | End: 2018-01-01 | Stop reason: HOSPADM

## 2018-01-01 RX ORDER — AMIODARONE HYDROCHLORIDE 200 MG/1
200 TABLET ORAL
Start: 2018-01-01

## 2018-01-01 RX ORDER — CARVEDILOL 12.5 MG/1
12.5 TABLET ORAL 2 TIMES DAILY WITH MEALS
Start: 2018-01-01

## 2018-01-01 RX ORDER — ASPIRIN 325 MG
325 TABLET ORAL DAILY
Start: 2018-01-01 | End: 2018-01-01

## 2018-01-01 RX ORDER — FLUNISOLIDE 0.25 MG/ML
1 SOLUTION NASAL DAILY
COMMUNITY

## 2018-01-01 RX ORDER — ONDANSETRON 4 MG/1
4 TABLET, ORALLY DISINTEGRATING ORAL EVERY 6 HOURS PRN
Status: DISCONTINUED | OUTPATIENT
Start: 2018-01-01 | End: 2018-01-01 | Stop reason: HOSPADM

## 2018-01-01 RX ORDER — ACETAMINOPHEN 650 MG/1
650 SUPPOSITORY RECTAL ONCE
Status: DISCONTINUED | OUTPATIENT
Start: 2018-01-01 | End: 2018-01-01

## 2018-01-01 RX ORDER — CARVEDILOL 6.25 MG/1
6.25 TABLET ORAL 2 TIMES DAILY WITH MEALS
Status: DISCONTINUED | OUTPATIENT
Start: 2018-01-01 | End: 2018-01-01

## 2018-01-01 RX ADMIN — Medication 2 TABLET: at 08:29

## 2018-01-01 RX ADMIN — Medication 2 TABLET: at 09:27

## 2018-01-01 RX ADMIN — ESCITALOPRAM 10 MG: 10 TABLET, FILM COATED ORAL at 20:30

## 2018-01-01 RX ADMIN — LEVOTHYROXINE SODIUM 50 MCG: 50 TABLET ORAL at 08:29

## 2018-01-01 RX ADMIN — CETIRIZINE HYDROCHLORIDE 5 MG: 10 TABLET, FILM COATED ORAL at 08:29

## 2018-01-01 RX ADMIN — ESCITALOPRAM 10 MG: 10 TABLET, FILM COATED ORAL at 21:34

## 2018-01-01 RX ADMIN — Medication 1 TABLET: at 08:38

## 2018-01-01 RX ADMIN — CETIRIZINE HYDROCHLORIDE 5 MG: 10 TABLET, FILM COATED ORAL at 08:38

## 2018-01-01 RX ADMIN — Medication 2 TABLET: at 08:48

## 2018-01-01 RX ADMIN — CARVEDILOL 12.5 MG: 12.5 TABLET, FILM COATED ORAL at 18:32

## 2018-01-01 RX ADMIN — CARVEDILOL 12.5 MG: 12.5 TABLET, FILM COATED ORAL at 18:42

## 2018-01-01 RX ADMIN — ASPIRIN 325 MG: 325 TABLET ORAL at 08:38

## 2018-01-01 RX ADMIN — BUDESONIDE AND FORMOTEROL FUMARATE DIHYDRATE 2 PUFF: 160; 4.5 AEROSOL RESPIRATORY (INHALATION) at 07:07

## 2018-01-01 RX ADMIN — IPRATROPIUM BROMIDE 1 SPRAY: 21 SPRAY NASAL at 09:27

## 2018-01-01 RX ADMIN — CETIRIZINE HYDROCHLORIDE 5 MG: 10 TABLET, FILM COATED ORAL at 08:24

## 2018-01-01 RX ADMIN — ATORVASTATIN CALCIUM 80 MG: 80 TABLET, FILM COATED ORAL at 20:30

## 2018-01-01 RX ADMIN — Medication 2 TABLET: at 08:22

## 2018-01-01 RX ADMIN — IOPAMIDOL 150 ML: 755 INJECTION, SOLUTION INTRAVENOUS at 13:20

## 2018-01-01 RX ADMIN — TIOTROPIUM BROMIDE 1 CAPSULE: 18 CAPSULE ORAL; RESPIRATORY (INHALATION) at 09:00

## 2018-01-01 RX ADMIN — IPRATROPIUM BROMIDE AND ALBUTEROL SULFATE 3 ML: .5; 3 SOLUTION RESPIRATORY (INHALATION) at 07:27

## 2018-01-01 RX ADMIN — AZELASTINE HYDROCHLORIDE 1 SPRAY: 137 SPRAY, METERED NASAL at 09:28

## 2018-01-01 RX ADMIN — CLOPIDOGREL 75 MG: 75 TABLET, FILM COATED ORAL at 09:40

## 2018-01-01 RX ADMIN — BARIUM SULFATE 65 ML: 960 POWDER, FOR SUSPENSION ORAL at 11:29

## 2018-01-01 RX ADMIN — FLUTICASONE PROPIONATE 1 SPRAY: 50 SPRAY, METERED NASAL at 08:48

## 2018-01-01 RX ADMIN — IPRATROPIUM BROMIDE 1 SPRAY: 21 SPRAY NASAL at 08:30

## 2018-01-01 RX ADMIN — FLUTICASONE PROPIONATE 1 SPRAY: 50 SPRAY, METERED NASAL at 08:29

## 2018-01-01 RX ADMIN — VITAMIN D, TAB 1000IU (100/BT) 2000 UNITS: 25 TAB at 09:40

## 2018-01-01 RX ADMIN — ASPIRIN 325 MG: 325 TABLET ORAL at 09:40

## 2018-01-01 RX ADMIN — IPRATROPIUM BROMIDE AND ALBUTEROL SULFATE 3 ML: .5; 3 SOLUTION RESPIRATORY (INHALATION) at 20:59

## 2018-01-01 RX ADMIN — VITAMIN D, TAB 1000IU (100/BT) 2000 UNITS: 25 TAB at 06:54

## 2018-01-01 RX ADMIN — CARVEDILOL 6.25 MG: 6.25 TABLET, FILM COATED ORAL at 14:48

## 2018-01-01 RX ADMIN — IPRATROPIUM BROMIDE 1 SPRAY: 21 SPRAY NASAL at 08:41

## 2018-01-01 RX ADMIN — IPRATROPIUM BROMIDE AND ALBUTEROL SULFATE 3 ML: .5; 3 SOLUTION RESPIRATORY (INHALATION) at 15:22

## 2018-01-01 RX ADMIN — ASPIRIN 325 MG: 325 TABLET ORAL at 08:29

## 2018-01-01 RX ADMIN — ESCITALOPRAM 10 MG: 10 TABLET, FILM COATED ORAL at 22:13

## 2018-01-01 RX ADMIN — VITAMIN D, TAB 1000IU (100/BT) 2000 UNITS: 25 TAB at 09:26

## 2018-01-01 RX ADMIN — FUROSEMIDE 20 MG: 20 TABLET ORAL at 09:26

## 2018-01-01 RX ADMIN — FLUTICASONE PROPIONATE 1 SPRAY: 50 SPRAY, METERED NASAL at 09:39

## 2018-01-01 RX ADMIN — CLOPIDOGREL 75 MG: 75 TABLET, FILM COATED ORAL at 09:26

## 2018-01-01 RX ADMIN — ASPIRIN 325 MG: 325 TABLET ORAL at 08:20

## 2018-01-01 RX ADMIN — IPRATROPIUM BROMIDE AND ALBUTEROL SULFATE 3 ML: .5; 3 SOLUTION RESPIRATORY (INHALATION) at 05:18

## 2018-01-01 RX ADMIN — BUDESONIDE AND FORMOTEROL FUMARATE DIHYDRATE 2 PUFF: 160; 4.5 AEROSOL RESPIRATORY (INHALATION) at 19:38

## 2018-01-01 RX ADMIN — BUDESONIDE AND FORMOTEROL FUMARATE DIHYDRATE 2 PUFF: 160; 4.5 AEROSOL RESPIRATORY (INHALATION) at 07:27

## 2018-01-01 RX ADMIN — BUDESONIDE AND FORMOTEROL FUMARATE DIHYDRATE 2 PUFF: 160; 4.5 AEROSOL RESPIRATORY (INHALATION) at 08:39

## 2018-01-01 RX ADMIN — BARIUM SULFATE 4 ML: 980 POWDER, FOR SUSPENSION ORAL at 11:29

## 2018-01-01 RX ADMIN — VITAMIN D, TAB 1000IU (100/BT) 2000 UNITS: 25 TAB at 08:41

## 2018-01-01 RX ADMIN — BUDESONIDE AND FORMOTEROL FUMARATE DIHYDRATE 2 PUFF: 160; 4.5 AEROSOL RESPIRATORY (INHALATION) at 19:30

## 2018-01-01 RX ADMIN — LEVOTHYROXINE SODIUM 50 MCG: 50 TABLET ORAL at 09:26

## 2018-01-01 RX ADMIN — SODIUM CHLORIDE 50 ML/HR: 9 INJECTION, SOLUTION INTRAVENOUS at 09:40

## 2018-01-01 RX ADMIN — AMIODARONE HYDROCHLORIDE 200 MG: 200 TABLET ORAL at 08:21

## 2018-01-01 RX ADMIN — IPRATROPIUM BROMIDE AND ALBUTEROL SULFATE 3 ML: .5; 3 SOLUTION RESPIRATORY (INHALATION) at 12:19

## 2018-01-01 RX ADMIN — FUROSEMIDE 20 MG: 20 TABLET ORAL at 04:59

## 2018-01-01 RX ADMIN — TIOTROPIUM BROMIDE 1 CAPSULE: 18 CAPSULE ORAL; RESPIRATORY (INHALATION) at 08:38

## 2018-01-01 RX ADMIN — IPRATROPIUM BROMIDE AND ALBUTEROL SULFATE 3 ML: .5; 3 SOLUTION RESPIRATORY (INHALATION) at 11:20

## 2018-01-01 RX ADMIN — ASPIRIN 325 MG: 325 TABLET ORAL at 08:42

## 2018-01-01 RX ADMIN — ASPIRIN 325 MG: 325 TABLET ORAL at 09:27

## 2018-01-01 RX ADMIN — IPRATROPIUM BROMIDE AND ALBUTEROL SULFATE 3 ML: .5; 3 SOLUTION RESPIRATORY (INHALATION) at 13:41

## 2018-01-01 RX ADMIN — IPRATROPIUM BROMIDE AND ALBUTEROL SULFATE 3 ML: .5; 3 SOLUTION RESPIRATORY (INHALATION) at 07:51

## 2018-01-01 RX ADMIN — AMIODARONE HYDROCHLORIDE 200 MG: 200 TABLET ORAL at 13:57

## 2018-01-01 RX ADMIN — VITAMIN D, TAB 1000IU (100/BT) 2000 UNITS: 25 TAB at 08:22

## 2018-01-01 RX ADMIN — FUROSEMIDE 20 MG: 20 TABLET ORAL at 08:42

## 2018-01-01 RX ADMIN — CLOPIDOGREL 75 MG: 75 TABLET, FILM COATED ORAL at 08:38

## 2018-01-01 RX ADMIN — AZELASTINE HYDROCHLORIDE 1 SPRAY: 137 SPRAY, METERED NASAL at 08:29

## 2018-01-01 RX ADMIN — BUDESONIDE AND FORMOTEROL FUMARATE DIHYDRATE 2 PUFF: 160; 4.5 AEROSOL RESPIRATORY (INHALATION) at 09:00

## 2018-01-01 RX ADMIN — LEVOTHYROXINE SODIUM 50 MCG: 50 TABLET ORAL at 08:20

## 2018-01-01 RX ADMIN — LEVOTHYROXINE SODIUM 50 MCG: 50 TABLET ORAL at 08:38

## 2018-01-01 RX ADMIN — CLOPIDOGREL 75 MG: 75 TABLET, FILM COATED ORAL at 08:23

## 2018-01-01 RX ADMIN — CARVEDILOL 12.5 MG: 12.5 TABLET, FILM COATED ORAL at 09:25

## 2018-01-01 RX ADMIN — BUDESONIDE AND FORMOTEROL FUMARATE DIHYDRATE 2 PUFF: 160; 4.5 AEROSOL RESPIRATORY (INHALATION) at 22:32

## 2018-01-01 RX ADMIN — Medication 2 TABLET: at 09:40

## 2018-01-01 RX ADMIN — METOPROLOL TARTRATE 5 MG: 5 INJECTION, SOLUTION INTRAVENOUS at 18:51

## 2018-01-01 RX ADMIN — CETIRIZINE HYDROCHLORIDE 5 MG: 10 TABLET, FILM COATED ORAL at 09:41

## 2018-01-01 RX ADMIN — IPRATROPIUM BROMIDE AND ALBUTEROL SULFATE 3 ML: .5; 3 SOLUTION RESPIRATORY (INHALATION) at 07:54

## 2018-01-01 RX ADMIN — IPRATROPIUM BROMIDE AND ALBUTEROL SULFATE 3 ML: .5; 3 SOLUTION RESPIRATORY (INHALATION) at 19:35

## 2018-01-01 RX ADMIN — CARVEDILOL 6.25 MG: 6.25 TABLET, FILM COATED ORAL at 08:42

## 2018-01-01 RX ADMIN — LEVOTHYROXINE SODIUM 50 MCG: 50 TABLET ORAL at 08:42

## 2018-01-01 RX ADMIN — FLUTICASONE PROPIONATE 1 SPRAY: 50 SPRAY, METERED NASAL at 09:28

## 2018-01-01 RX ADMIN — ESCITALOPRAM 10 MG: 10 TABLET, FILM COATED ORAL at 21:01

## 2018-01-01 RX ADMIN — CLOPIDOGREL 75 MG: 75 TABLET, FILM COATED ORAL at 08:42

## 2018-01-01 RX ADMIN — CLOPIDOGREL 75 MG: 75 TABLET, FILM COATED ORAL at 08:29

## 2018-01-01 RX ADMIN — BUDESONIDE AND FORMOTEROL FUMARATE DIHYDRATE 2 PUFF: 160; 4.5 AEROSOL RESPIRATORY (INHALATION) at 20:34

## 2018-01-01 RX ADMIN — BUDESONIDE AND FORMOTEROL FUMARATE DIHYDRATE 2 PUFF: 160; 4.5 AEROSOL RESPIRATORY (INHALATION) at 07:51

## 2018-01-01 RX ADMIN — CETIRIZINE HYDROCHLORIDE 5 MG: 10 TABLET, FILM COATED ORAL at 09:27

## 2018-01-01 RX ADMIN — AZELASTINE HYDROCHLORIDE 1 SPRAY: 137 SPRAY, METERED NASAL at 08:48

## 2018-01-01 RX ADMIN — LEVOTHYROXINE SODIUM 50 MCG: 50 TABLET ORAL at 09:40

## 2018-01-01 RX ADMIN — ATORVASTATIN CALCIUM 80 MG: 80 TABLET, FILM COATED ORAL at 21:12

## 2018-01-01 RX ADMIN — FLUTICASONE PROPIONATE 1 SPRAY: 50 SPRAY, METERED NASAL at 08:40

## 2018-01-01 RX ADMIN — ESCITALOPRAM 10 MG: 10 TABLET, FILM COATED ORAL at 21:12

## 2018-01-01 RX ADMIN — IPRATROPIUM BROMIDE AND ALBUTEROL SULFATE 3 ML: .5; 3 SOLUTION RESPIRATORY (INHALATION) at 19:30

## 2018-01-01 RX ADMIN — ATORVASTATIN CALCIUM 80 MG: 80 TABLET, FILM COATED ORAL at 22:13

## 2018-01-01 RX ADMIN — IPRATROPIUM BROMIDE AND ALBUTEROL SULFATE 3 ML: .5; 3 SOLUTION RESPIRATORY (INHALATION) at 20:34

## 2018-01-01 RX ADMIN — VITAMIN D, TAB 1000IU (100/BT) 2000 UNITS: 25 TAB at 06:42

## 2018-01-01 RX ADMIN — ATORVASTATIN CALCIUM 80 MG: 80 TABLET, FILM COATED ORAL at 21:01

## 2018-01-01 RX ADMIN — BUDESONIDE AND FORMOTEROL FUMARATE DIHYDRATE 2 PUFF: 160; 4.5 AEROSOL RESPIRATORY (INHALATION) at 20:59

## 2018-01-01 RX ADMIN — CARVEDILOL 12.5 MG: 12.5 TABLET, FILM COATED ORAL at 08:22

## 2018-01-01 RX ADMIN — CETIRIZINE HYDROCHLORIDE 5 MG: 10 TABLET, FILM COATED ORAL at 08:41

## 2018-01-01 RX ADMIN — ASPIRIN 300 MG: 300 SUPPOSITORY RECTAL at 14:09

## 2018-01-01 RX ADMIN — BARIUM SULFATE 50 ML: 400 SUSPENSION ORAL at 11:29

## 2018-01-01 RX ADMIN — AZELASTINE HYDROCHLORIDE 1 SPRAY: 137 SPRAY, METERED NASAL at 08:40

## 2018-01-01 RX ADMIN — ALBUTEROL SULFATE 2.5 MG: 2.5 SOLUTION RESPIRATORY (INHALATION) at 21:40

## 2018-01-01 RX ADMIN — CARVEDILOL 6.25 MG: 6.25 TABLET, FILM COATED ORAL at 17:46

## 2018-01-01 RX ADMIN — IPRATROPIUM BROMIDE AND ALBUTEROL SULFATE 3 ML: .5; 3 SOLUTION RESPIRATORY (INHALATION) at 07:07

## 2018-01-01 RX ADMIN — IPRATROPIUM BROMIDE 1 SPRAY: 21 SPRAY NASAL at 08:48

## 2018-01-01 RX ADMIN — FUROSEMIDE 20 MG: 20 TABLET ORAL at 08:20

## 2018-01-01 RX ADMIN — SODIUM CHLORIDE 1000 ML: 9 INJECTION, SOLUTION INTRAVENOUS at 13:27

## 2018-01-01 RX ADMIN — IPRATROPIUM BROMIDE AND ALBUTEROL SULFATE 3 ML: .5; 3 SOLUTION RESPIRATORY (INHALATION) at 16:43

## 2018-01-01 RX ADMIN — ATORVASTATIN CALCIUM 80 MG: 80 TABLET, FILM COATED ORAL at 21:34

## 2018-01-01 RX ADMIN — IPRATROPIUM BROMIDE 1 SPRAY: 21 SPRAY NASAL at 09:40

## 2018-01-01 RX ADMIN — AZELASTINE HYDROCHLORIDE 1 SPRAY: 137 SPRAY, METERED NASAL at 09:39

## 2018-01-02 ENCOUNTER — TELEPHONE (OUTPATIENT)
Dept: FAMILY MEDICINE CLINIC | Facility: CLINIC | Age: 83
End: 2018-01-02

## 2018-01-02 RX ORDER — CARVEDILOL 3.12 MG/1
3.12 TABLET ORAL 2 TIMES DAILY WITH MEALS
Qty: 60 TABLET | Refills: 3 | Status: SHIPPED | OUTPATIENT
Start: 2018-01-02 | End: 2018-04-22 | Stop reason: SDUPTHER

## 2018-01-02 RX ORDER — CARVEDILOL 3.12 MG/1
TABLET ORAL
Qty: 45 TABLET | Refills: 2 | Status: SHIPPED | OUTPATIENT
Start: 2018-01-02 | End: 2018-01-02 | Stop reason: SDUPTHER

## 2018-01-02 NOTE — TELEPHONE ENCOUNTER
REFILL ON carvedilol (COREG) 3.125 MG tablet  Take 1 tablet by mouth 2 (Two) Times a Day With Meals

## 2018-01-08 RX ORDER — CLOPIDOGREL BISULFATE 75 MG/1
TABLET ORAL
Qty: 30 TABLET | Refills: 0 | Status: SHIPPED | OUTPATIENT
Start: 2018-01-08 | End: 2018-03-08 | Stop reason: SDUPTHER

## 2018-01-15 ENCOUNTER — TELEPHONE (OUTPATIENT)
Dept: FAMILY MEDICINE CLINIC | Facility: CLINIC | Age: 83
End: 2018-01-15

## 2018-01-18 RX ORDER — PRAVASTATIN SODIUM 40 MG
TABLET ORAL
Qty: 30 TABLET | Refills: 4 | Status: SHIPPED | OUTPATIENT
Start: 2018-01-18 | End: 2018-01-01 | Stop reason: SDUPTHER

## 2018-01-18 RX ORDER — ESCITALOPRAM OXALATE 10 MG/1
TABLET ORAL
Qty: 30 TABLET | Refills: 4 | Status: SHIPPED | OUTPATIENT
Start: 2018-01-18 | End: 2018-01-01 | Stop reason: SDUPTHER

## 2018-01-18 RX ORDER — FUROSEMIDE 20 MG/1
TABLET ORAL
Qty: 60 TABLET | Refills: 4 | Status: SHIPPED | OUTPATIENT
Start: 2018-01-18 | End: 2018-01-01 | Stop reason: SDUPTHER

## 2018-01-18 NOTE — TELEPHONE ENCOUNTER
Informed alfredo  She is going to make appt to come in and discuss and get imaging  ----- Message from Devan Snow Jr., MD sent at 1/17/2018  5:01 PM EST -----  All worthwhile thoughts  Re back  Want to know if and where last imaging of back done  Can consider brace w our  Tens unit rep as well as  Pt  Need recent imaging  To make sure no compression fx  Tr pain meds would prob start mild w tramadolo if not too drowsy  ----- Message -----     From: Cristy Bailon     Sent: 1/15/2018  12:59 PM       To: Devan Snow Jr., MD

## 2018-01-22 RX ORDER — LEVOTHYROXINE SODIUM 0.05 MG/1
TABLET ORAL
Qty: 30 TABLET | Refills: 0 | Status: SHIPPED | OUTPATIENT
Start: 2018-01-22 | End: 2018-02-17 | Stop reason: SDUPTHER

## 2018-02-08 RX ORDER — CLOPIDOGREL BISULFATE 75 MG/1
TABLET ORAL
Qty: 30 TABLET | Refills: 0 | Status: SHIPPED | OUTPATIENT
Start: 2018-02-08 | End: 2018-03-08 | Stop reason: SDUPTHER

## 2018-02-19 RX ORDER — LEVOTHYROXINE SODIUM 0.05 MG/1
TABLET ORAL
Qty: 30 TABLET | Refills: 2 | Status: SHIPPED | OUTPATIENT
Start: 2018-02-19 | End: 2018-01-01 | Stop reason: SDUPTHER

## 2018-03-08 RX ORDER — CLOPIDOGREL BISULFATE 75 MG/1
TABLET ORAL
Qty: 30 TABLET | Refills: 0 | Status: SHIPPED | OUTPATIENT
Start: 2018-03-08 | End: 2018-04-06 | Stop reason: SDUPTHER

## 2018-04-06 RX ORDER — CLOPIDOGREL BISULFATE 75 MG/1
TABLET ORAL
Qty: 30 TABLET | Refills: 0 | Status: SHIPPED | OUTPATIENT
Start: 2018-04-06 | End: 2018-05-10 | Stop reason: SDUPTHER

## 2018-04-09 ENCOUNTER — TELEPHONE (OUTPATIENT)
Dept: FAMILY MEDICINE CLINIC | Facility: CLINIC | Age: 83
End: 2018-04-09

## 2018-04-09 NOTE — TELEPHONE ENCOUNTER
PT'S DAUGHTER ASKING IF THEY CAN WEEN PT OFF OF LEXAPRO BY GIVING IT TO HER 3 NIGHTS A WEEK, INSTEAD OF CONTINUING EVERY NIGHT    PT'S DAUGHTER STATED HER MOTHER IS VERY SLEEPY, ALMOST IN A DAZE, AND CANNOT EVEN GET DRESSED IN THE MORNINGS BECAUSE OF THIS. DAUGHTER STATED SHE THINKS THIS IS BECAUSE OF THE RX

## 2018-04-16 ENCOUNTER — TELEPHONE (OUTPATIENT)
Dept: FAMILY MEDICINE CLINIC | Facility: CLINIC | Age: 83
End: 2018-04-16

## 2018-04-16 NOTE — TELEPHONE ENCOUNTER
UPDATE IS LAST WEEK TOOK HER OFF LEXAPRO 10 MG CUT TO 5 MG AND GURINDER  SAID SHE DIDN'T FEEL WELL WITHOUT IT AND FOR 3 DAYS PULSE OVER A HUNDRED AND BLOOD PRESSURE WAS UP SLIGHTLY AS WELL. HAS DECIDED TO GIVE HER THE WHOLE PILL 10 MG LEXAPRO SO SHE IS GOING STAY ON It. JUST AN FYI

## 2018-04-20 ENCOUNTER — TELEPHONE (OUTPATIENT)
Dept: FAMILY MEDICINE CLINIC | Facility: CLINIC | Age: 83
End: 2018-04-20

## 2018-04-23 RX ORDER — CARVEDILOL 3.12 MG/1
TABLET ORAL
Qty: 60 TABLET | Refills: 2 | Status: SHIPPED | OUTPATIENT
Start: 2018-04-23 | End: 2018-04-23 | Stop reason: SDUPTHER

## 2018-04-23 RX ORDER — CARVEDILOL 3.12 MG/1
3.12 TABLET ORAL 2 TIMES DAILY WITH MEALS
Qty: 60 TABLET | Refills: 2 | Status: SHIPPED | OUTPATIENT
Start: 2018-04-23 | End: 2018-01-01

## 2018-05-10 RX ORDER — CLOPIDOGREL BISULFATE 75 MG/1
TABLET ORAL
Qty: 30 TABLET | Refills: 1 | Status: SHIPPED | OUTPATIENT
Start: 2018-05-10 | End: 2018-01-01

## 2018-07-02 NOTE — TELEPHONE ENCOUNTER
january an appt tomorrow but wanted noted today her pulse is sometimes up to 125 at times and worrying them a lot, please call

## 2018-07-03 NOTE — PROGRESS NOTES
Subjective   Meggan Huynh is a 88 y.o. female.   Getting a follow-up on blood pressure episodes were heart rate goes up to 1280family did say they remain also which is at 120 blood pressure readings 70s and 80s.  History of Present Illness   leg edema    Hr  120 no chest pain complaints minimal chest shortness of breath but nothing of significant nature, no PND individual has known CHF.  but is having leg edema family has history of  CHF.  Has yearly follow-up with her cardiologist next couple months.  Blood pressure somewhat variable again some leg edema is already referenced no PND    Review of Systems   Constitutional: Positive for fatigue.   HENT: Negative.    Eyes: Negative.    Respiratory: Positive for shortness of breath.    Cardiovascular: Positive for palpitations and leg swelling.   Gastrointestinal: Negative.    Endocrine: Negative.    Genitourinary: Negative.    Musculoskeletal: Positive for back pain.   Skin: Negative.    Allergic/Immunologic: Negative.    Neurological: Negative.    Hematological: Negative.    Psychiatric/Behavioral: Negative.        Objective   Vitals:    07/03/18 1551   BP: 130/80   Pulse: 120   Resp: 16   Temp: 96.9 °F (36.1 °C)   SpO2: 93%   Weight: 75.9 kg (167 lb 6.4 oz)     Physical Exam   Constitutional: She appears well-developed and well-nourished.   HENT:   Head: Normocephalic and atraumatic.   Eyes: Conjunctivae are normal. Pupils are equal, round, and reactive to light.   Cardiovascular: Normal rate, regular rhythm and normal heart sounds.    Pulmonary/Chest: Effort normal and breath sounds normal.   Abdominal: Soft. Bowel sounds are normal.   Musculoskeletal:   1+ distal lower leg and ankle edema   Neurological:   Neurologically at baseline somewhat slow and wide-based gait   Skin: Skin is warm and dry.   Nursing note and vitals reviewed.      No results found for: INR      ECG 12 Lead  Date/Time: 7/3/2018 4:11 PM  Performed by: SETH FRIAS JR  Authorized by:  SETH FRIAS JR   Previous ECG: no previous ECG available  Rate: tachycardic  ST Depression: V5 and V6  T depression: V5, V6, I and aVL  Clinical impression: abnormal ECG  Comments: Patient has undefined tachycardia do not see P waves present: Sinus tachycardia rate is regular with rate 120 manual count 114 back computer read T-wave version in lead 1 and aVL T-wave inversion in V5V6 with mild ST segment depression in V5 and V6.  I do see evidence that there is an EKG done in 2017 at [facility] but I cannot access it 80.  No chest pain complaints.  Do not see P waves to have a NM interval QRS is 109 MS QT intervals 346 MS abnormal EKG didn't do rhythm strip shows supraventricular rhythm regular without evidence of any PVCs or PACs seen.          Rhythm strip  Assessment/Plan   Supraventricular tachycardia plan to ER now     blood pressure labile continue present medicine for time being, assessment in ER    Abnormal EKG cannot exclude cardiac event to ER now    Will withhold lab as most what I would be getting will be obtained in the ER for time being.      Much of this encounter note is an electronic transcription/translation of spoken language to printed text.  The electronic translation of spoken language may permit erroneous, or at times, nonsensical words or phrases to be inadvertently transcribed.  Although I have reviewed the note for such errors, some may still exist. If there are questions or for further clarification, please contact me.

## 2018-07-03 NOTE — ED NOTES
"Per family- pt reports that pt's \"pulse has been high over the past 3 weeks\" which is why she seen MD Snow today; also c/o fatigue. Pt reports some edema noticed this morning of her feet. Reports SOA with exertion; denies chest pain. Reassurance given; call light in reach. Pts breathing even and unlabored. Pt appears in NAD at this time. Family at bedside.        Amie Pendleton RN  07/03/18 1912    "

## 2018-07-03 NOTE — TELEPHONE ENCOUNTER
If this a manual pulse or recorded by pulse ox or   Or bp machine  If manual then willneed some testing

## 2018-07-03 NOTE — ED PROVIDER NOTES
EMERGENCY DEPARTMENT ENCOUNTER    CHIEF COMPLAINT  Chief Complaint: Abnormal EKG  History given by: Patient  History limited by: none  Room Number: 31/31  PMD: Devan Snow MD      HPI:  Pt is a 88 y.o. female who presents from Dr. Snow's office due to abnormal EKG performed in office earlier today. Pt states she saw Dr. Snow today due to high BP for 3 weeks, and states her only symptoms are increased fatigue. Pt's family states pt has hx of COPD, CABG, and stent placement, as well as takes Plavix and BP medication daily.  She denies chest pain.  She denies dyspnea.    Duration:  Began PTA  Onset: unknown  Timing: constant  Location: none  Radiation: none  Quality: abnormal EKG  Intensity/Severity: severe  Progression: unchanged  Associated Symptoms: mild BLE edema and fatigue  Aggravating Factors: unknown  Alleviating Factors: none  Previous Episodes: Pt has hx of COPD, CABG, and stent placement.   Treatment before arrival: Pt is on Plavix and BP medication    PAST MEDICAL HISTORY  Active Ambulatory Problems     Diagnosis Date Noted   • No Active Ambulatory Problems     Resolved Ambulatory Problems     Diagnosis Date Noted   • No Resolved Ambulatory Problems     Past Medical History:   Diagnosis Date   • Ankle pain, right    • Anxiety    • CHF (congestive heart failure) (CMS/HCC)    • Chronic renal disease, stage 3, moderately decreased glomerular filtration rate between 30-59 mL/min/1.73 square meter    • COPD (chronic obstructive pulmonary disease) (CMS/HCC)    • Coronary artery disease    • Depression    • Hyperlipidemia    • Hypertension    • Hypothyroidism    • Stroke (CMS/HCC)        PAST SURGICAL HISTORY  History reviewed. No pertinent surgical history.    FAMILY HISTORY  History reviewed. No pertinent family history.    SOCIAL HISTORY  Social History     Social History   • Marital status: Single     Spouse name: N/A   • Number of children: N/A   • Years of education: N/A     Occupational  History   • Not on file.     Social History Main Topics   • Smoking status: Former Smoker   • Smokeless tobacco: Not on file   • Alcohol use No   • Drug use: Unknown   • Sexual activity: Defer     Other Topics Concern   • Not on file     Social History Narrative   • No narrative on file       ALLERGIES  Codeine; Hydrocodone; Keflex [cephalexin]; and Levaquin [levofloxacin in d5w]    REVIEW OF SYSTEMS  Review of Systems   Constitutional: Positive for fatigue. Negative for fever.   HENT: Negative for sore throat.    Eyes: Negative.    Respiratory: Negative for cough and shortness of breath.    Cardiovascular: Positive for leg swelling (mild BLE). Negative for chest pain.   Gastrointestinal: Negative for abdominal pain, diarrhea and vomiting.   Genitourinary: Negative for dysuria.   Musculoskeletal: Negative for neck pain.   Skin: Negative for rash.   Allergic/Immunologic: Negative.    Neurological: Negative for weakness, numbness and headaches.   Hematological: Negative.    Psychiatric/Behavioral: Negative.    All other systems reviewed and are negative.      PHYSICAL EXAM  ED Triage Vitals   Temp Heart Rate Resp BP SpO2   07/03/18 1705 07/03/18 1705 07/03/18 1705 07/03/18 1722 07/03/18 1705   96.5 °F (35.8 °C) 117 20 (!) 183/100 94 %      Temp src Heart Rate Source Patient Position BP Location FiO2 (%)   -- -- -- -- --              Physical Exam   Constitutional: She is oriented to person, place, and time. No distress.   HENT:   Head: Normocephalic and atraumatic.   Eyes: EOM are normal. Pupils are equal, round, and reactive to light.   Neck: Normal range of motion. Neck supple.   Cardiovascular: Normal rate, regular rhythm and normal heart sounds.    Pulmonary/Chest: Effort normal and breath sounds normal. No respiratory distress.   Abdominal: Soft. There is no tenderness. There is no rebound and no guarding.   Musculoskeletal: Normal range of motion. She exhibits no edema.   Neurological: She is alert and oriented  to person, place, and time. She has normal sensation and normal strength.   Skin: Skin is warm and dry. No rash noted.   Psychiatric: Mood and affect normal.   Nursing note and vitals reviewed.      LAB RESULTS  Lab Results (last 24 hours)     Procedure Component Value Units Date/Time    CBC & Differential [[id number]] Collected:  07/03/18 1725    Specimen:  Blood Updated:  07/03/18 1734    Narrative:       The following orders were created for panel order CBC & Differential.  Procedure                               Abnormality         Status                     ---------                               -----------         ------                     CBC Auto Differential[[id number]]        Abnormal            Final result                 Please view results for these tests on the individual orders.    Comprehensive Metabolic Panel [[id number]]  (Abnormal) Collected:  07/03/18 1725    Specimen:  Blood Updated:  07/03/18 1806     Glucose 87 mg/dL      BUN 30 (H) mg/dL      Creatinine 1.43 (H) mg/dL      Sodium 143 mmol/L      Potassium 4.4 mmol/L      Chloride 104 mmol/L      CO2 24.2 mmol/L      Calcium 8.5 (L) mg/dL      Total Protein 7.0 g/dL      Albumin 4.00 g/dL      ALT (SGPT) 22 U/L      AST (SGOT) 27 U/L      Comment: Specimen hemolyzed.  Results may be affected.        Alkaline Phosphatase 97 U/L      Total Bilirubin 0.3 mg/dL      eGFR Non African Amer 35 (L) mL/min/1.73      Globulin 3.0 gm/dL      A/G Ratio 1.3 g/dL      BUN/Creatinine Ratio 21.0     Anion Gap 14.8 mmol/L     Narrative:       The MDRD GFR formula is only valid for adults with stable renal function between ages 18 and 70.    TSH [[id number]]  (Normal) Collected:  07/03/18 1725    Specimen:  Blood Updated:  07/03/18 1812     TSH 2.980 mIU/mL     Magnesium [[id number]]  (Normal) Collected:  07/03/18 1725    Specimen:  Blood Updated:  07/03/18 1805     Magnesium 2.1 mg/dL     BNP [[id number]]  (Abnormal) Collected:  07/03/18 1725    Specimen:  Blood  Updated:  07/03/18 1812     proBNP 3,895.0 (H) pg/mL     Narrative:       Among patients with dyspnea, NT-proBNP is highly sensitive for the detection of acute congestive heart failure. In addition NT-proBNP of <300 pg/ml effectively rules out acute congestive heart failure with 99% negative predictive value.    Troponin [[id number]]  (Normal) Collected:  07/03/18 1725    Specimen:  Blood Updated:  07/03/18 1812     Troponin T <0.010 ng/mL     Narrative:       Troponin T Reference Ranges:  Less than 0.03 ng/mL:    Negative for AMI  0.03 to 0.09 ng/mL:      Indeterminant for AMI  Greater than 0.09 ng/mL: Positive for AMI    CBC Auto Differential [[id number]]  (Abnormal) Collected:  07/03/18 1725    Specimen:  Blood Updated:  07/03/18 1734     WBC 9.12 10*3/mm3      RBC 4.60 10*6/mm3      Hemoglobin 13.0 g/dL      Hematocrit 40.8 %      MCV 88.7 fL      MCH 28.3 pg      MCHC 31.9 (L) g/dL      RDW 15.7 (H) %      RDW-SD 50.2 fl      MPV 12.2 (H) fL      Platelets 200 10*3/mm3      Neutrophil % 58.2 %      Lymphocyte % 17.8 (L) %      Monocyte % 12.6 (H) %      Eosinophil % 10.6 (H) %      Basophil % 0.8 %      Immature Grans % 0.0 %      Neutrophils, Absolute 5.31 10*3/mm3      Lymphocytes, Absolute 1.62 10*3/mm3      Monocytes, Absolute 1.15 10*3/mm3      Eosinophils, Absolute 0.97 (H) 10*3/mm3      Basophils, Absolute 0.07 10*3/mm3      Immature Grans, Absolute 0.00 10*3/mm3     D-dimer, Quantitative [[id number]]  (Abnormal) Collected:  07/03/18 1725    Specimen:  Blood Updated:  07/03/18 1823     D-Dimer, Quantitative 1.17 (H) MCGFEU/mL     Narrative:       The Stago D-Dimer test used in conjunction with a clinical pretest probability (PTP) assessment model, has been approved by the FDA to rule out the presence of venous thromboembolism (VTE) in outpatients suspected of deep venous thrombosis (DVT) or pulmonary embolism (PE).     Urinalysis With Microscopic If Indicated (No Culture) - Urine, Clean Catch [[id number]]   (Abnormal) Collected:  07/03/18 1844    Specimen:  Urine from Urine, Clean Catch Updated:  07/03/18 1916     Color, UA Yellow     Appearance, UA Clear     pH, UA <=5.0     Specific Gravity, UA 1.013     Glucose, UA Negative     Ketones, UA Negative     Bilirubin, UA Negative     Blood, UA Negative     Protein, UA Negative     Leuk Esterase, UA Moderate (2+) (A)     Nitrite, UA Negative     Urobilinogen, UA 0.2 E.U./dL    Urinalysis, Microscopic Only - Urine, Clean Catch [[id number]]  (Abnormal) Collected:  07/03/18 1844    Specimen:  Urine from Urine, Clean Catch Updated:  07/03/18 1916     RBC, UA 0-2 /HPF      WBC, UA 6-12 (A) /HPF      Bacteria, UA None Seen /HPF      Squamous Epithelial Cells, UA 3-6 (A) /HPF      Hyaline Casts, UA 0-2 /LPF      Methodology Automated Microscopy          I ordered the above labs and reviewed the results    RADIOLOGY  XR Chest 1 View   Final Result   No focal pulmonary consolidation. Borderline heart size.   Tortuous aorta. Follow-up/further evaluation can be obtained as   indicated.       This report was finalized on 7/3/2018 6:42 PM by Dr. Kane Palacios M.D.               I ordered the above noted radiological studies. Interpreted by radiologist. Reviewed by me in PACS.       PROCEDURES  Procedures  EKG           EKG time: 1712  Rhythm/Rate: Regular Wide Complex Tachycardia, 115  P waves and MS: absent  QRS, axis: NSIVCD with Q waves anteriorly    ST and T waves: nonspecific ST changes     Interpreted Contemporaneously by me, independently viewed  Unchanged compared to prior at the office today. No other old EKG in [facility], but notes from [facility] office show that pt has incomplete LBBB, EKG is not in the system for comparison.     EKG           EKG time: 1916  Rhythm/Rate: NSR 73  P waves and MS: nml  QRS, axis: LBBB   ST and T waves: nml     Interpreted Contemporaneously by me, independently viewed  Changed compared to prior earlier, rate improved. Found old EKG from 11/10/08,  and this EKG is unchanged from that time.       PROGRESS AND CONSULTS  ED Course as of Jul 03 2014   Tue Jul 03, 2018   1733 Pt is an 88 yof with a cc of tachycardia. Rfamily reports they have been checking her pulse since June 5 and note that it has been elevated >100. She has no symptoms, feels fine. Saw her pcp today who ordered an EKG and dx her with SVT and referred her to the ER. Family states no new meds, no recent d'c of meds. No fever, vomiting, diarrhea.  EXAM: awake alert, no distress. Lungs clear. Heart tachycardic. 1+ bilateral dependent edema.  [JS]   1750 Dr Sage in department. Requested D Dimer testing.   [JS]      ED Course User Index  [JS] Jenni Leone Kentrell, APRN     1841: Lopressor ordered.    1854: Pt's HR and blood pressre improved almost immediately after receiving IV toprol.  I was able to find a remote EKG, and there are no new changes - the left BBB is old.     2001: Pt rechecked and resting comfortably, HR at 58. Discussed results of labs and CXR without acute findings. Discussed results of repeat EKG. Discussed plan to increase Coreg 3.125mg in the morning and at night, hold in the morning if BP is less than 120, and hold both if her BP systolic is less than 100. Pt directed to follow up with Dr. Salvador, cardiologist. Pt understands and agrees with plan, all questions addressed.          MEDICAL DECISION MAKING  Results were reviewed/discussed with the patient and they were also made aware of online access. Pt also made aware that some labs, such as cultures, will not be resulted during ER visit and follow up with PMD is necessary.     MDM  Number of Diagnoses or Management Options  Abnormal EKG:      Amount and/or Complexity of Data Reviewed  Clinical lab tests: reviewed (proBNP - 3895.0  D-dimer - 1.17  )  Tests in the radiology section of CPT®: reviewed (CXR - no focal pulmonary consolidation)  Tests in the medicine section of CPT®: reviewed (See note)  Decide to obtain previous  medical records or to obtain history from someone other than the patient: yes           DIAGNOSIS  Final diagnoses:   Hypertension, poor control   Tachycardia   Left bundle branch block       DISPOSITION  DISCHARGE    Patient discharged in stable condition.    Reviewed implications of results, diagnosis, meds, responsibility to follow up, warning signs and symptoms of possible worsening, potential complications and reasons to return to ER.    Patient/Family voiced understanding of above instructions.    Discussed plan for discharge, as there is no emergent indication for admission. Patient referred to primary care provider for BP management due to today's BP. Pt/family is agreeable and understands need for follow up and repeat testing.  Pt is aware that discharge does not mean that nothing is wrong but it indicates no emergency is present that requires admission and they must continue care with follow-up as given below or physician of their choice.     FOLLOW-UP  Richard Salvador MD  [address] [id number] [phone]    Schedule an appointment as soon as possible for a visit in 1 day           Medication List      Changed    carvedilol 3.125 MG tablet  Commonly known as:  COREG  Take 1 tablet by mouth See Admin Instructions. Take 2 tablets in the   morning (hold for blood pressure <100) and one tablet in the evening.  What changed:  when to take this  additional instructions              Latest Documented Vital Signs:  As of 8:14 PM  BP- (!) 185/81 HR- 58 Temp- 96.5 °F (35.8 °C) O2 sat- 95%    --  Documentation assistance provided by dima Zuñiga for Dr. Mensah.  Information recorded by the scribe was done at my direction and has been verified and validated by me.               Cristina Zuñiga  07/03/18 2014       Lennox Mensah MD  07/04/18 0020

## 2018-07-03 NOTE — ED TRIAGE NOTES
Pt was seen at Dr. Snow's office today and had an EKG done and was sent here for an abnormal EKG.   Denies pain at this time.

## 2018-07-06 PROBLEM — Z86.79 H/O CHF: Chronic | Status: ACTIVE | Noted: 2018-01-01

## 2018-07-06 PROBLEM — I10 HYPERTENSION: Chronic | Status: ACTIVE | Noted: 2018-01-01

## 2018-07-06 PROBLEM — E03.9 HYPOTHYROIDISM: Chronic | Status: ACTIVE | Noted: 2018-01-01

## 2018-07-06 PROBLEM — E78.5 HYPERLIPIDEMIA: Chronic | Status: ACTIVE | Noted: 2018-01-01

## 2018-07-06 PROBLEM — N18.30 CHRONIC RENAL DISEASE, STAGE 3, MODERATELY DECREASED GLOMERULAR FILTRATION RATE BETWEEN 30-59 ML/MIN/1.73 SQUARE METER (HCC): Chronic | Status: ACTIVE | Noted: 2018-01-01

## 2018-07-06 PROBLEM — I63.512 CEREBROVASCULAR ACCIDENT (CVA) DUE TO OCCLUSION OF LEFT MIDDLE CEREBRAL ARTERY (HCC): Status: ACTIVE | Noted: 2018-01-01

## 2018-07-06 NOTE — ED PROVIDER NOTES
EMERGENCY DEPARTMENT ENCOUNTER    CHIEF COMPLAINT  Chief Complaint: AMS  History given by: pt's family  History limited by: AMS  Room Number: 27/27  PMD: Devan Snow MD      HPI:  Pt is a 88 y.o. female who presents to the ED with complaint of AMS [the pt had incoherent speech and right-sided facial droop]. Pt's family reports that the patient's last known normal was 2000 yesterday night. They called her at 1130 today and she answered the phone with incoherent and garbled speech.  They went to her house and found her in bed and called EMS. Pt's family states that the pt has a hx of a stroke [10 years ago]. There are no other complaints at this time. Pt is currently taking Plavix [started it after she had her previous stroke]. There are no other complaints at this time.    Duration: unknown; pt's last known normal was 2000 yesterday night  Onset: sudden  Timing: constant  Quality: AMS  Intensity/Severity: moderate  Previous Episodes: Pt's family states that the pt has a hx of a stroke [10 years ago].    PAST MEDICAL HISTORY  Active Ambulatory Problems     Diagnosis Date Noted   • No Active Ambulatory Problems     Resolved Ambulatory Problems     Diagnosis Date Noted   • No Resolved Ambulatory Problems     Past Medical History:   Diagnosis Date   • Ankle pain, right    • Anxiety    • CHF (congestive heart failure) (CMS/HCC)    • Chronic renal disease, stage 3, moderately decreased glomerular filtration rate between 30-59 mL/min/1.73 square meter    • COPD (chronic obstructive pulmonary disease) (CMS/HCC)    • Coronary artery disease    • Depression    • Hyperlipidemia    • Hypertension    • Hypothyroidism    • Stroke (CMS/HCC)        PAST SURGICAL HISTORY  History reviewed. No pertinent surgical history.    FAMILY HISTORY  History reviewed. No pertinent family history.    SOCIAL HISTORY  Social History     Social History   • Marital status: Single     Spouse name: N/A   • Number of children: N/A   • Years of  education: N/A     Occupational History   • Not on file.     Social History Main Topics   • Smoking status: Former Smoker   • Smokeless tobacco: Not on file   • Alcohol use No   • Drug use: Unknown   • Sexual activity: Defer     Other Topics Concern   • Not on file     Social History Narrative   • No narrative on file       ALLERGIES  Codeine; Hydrocodone; Keflex [cephalexin]; and Levaquin [levofloxacin in d5w]    REVIEW OF SYSTEMS  Review of Systems   Unable to perform ROS: Mental status change       PHYSICAL EXAM  ED Triage Vitals   Temp Heart Rate Resp BP SpO2   07/06/18 1232 07/06/18 1227 07/06/18 1232 07/06/18 1227 07/06/18 1227   97.5 °F (36.4 °C) 60 16 111/70 99 %      Temp src Heart Rate Source Patient Position BP Location FiO2 (%)   07/06/18 1232 07/06/18 1227 07/06/18 1227 -- --   Tympanic Monitor Sitting         Physical Exam   Constitutional: She is oriented to person, place, and time. She appears distressed (mild).   HENT:   Head: Normocephalic and atraumatic.   Eyes: EOM are normal. Pupils are equal, round, and reactive to light.   Neck: Normal range of motion. Neck supple.   Cardiovascular: Normal rate, regular rhythm and normal heart sounds.  Exam reveals no gallop and no friction rub.    No murmur heard.  Pulmonary/Chest: Effort normal and breath sounds normal. No respiratory distress.   Abdominal: Soft. There is no tenderness. There is no rebound and no guarding.   Musculoskeletal: Normal range of motion. She exhibits no edema.   Neurological: She is alert and oriented to person, place, and time. She has normal sensation and normal strength. She displays atrophy (expressive aphasia and dysarythria ).   Pt only follows one command. The patient has right sided facial droop. No focal weakness.   Skin: Skin is warm and dry. No rash noted.   Psychiatric: Mood and affect normal.   Nursing note and vitals reviewed.      LAB RESULTS  Lab Results (last 24 hours)     Procedure Component Value Units Date/Time     POC Glucose Once [[id number]]  (Normal) Collected:  07/06/18 1251    Specimen:  Blood Updated:  07/06/18 1310     Glucose 105 mg/dL     Narrative:       Meter: [id number] : [id number] Zenon haley    CBC & Differential [[id number]] Collected:  07/06/18 1255    Specimen:  Blood Updated:  07/06/18 1302    Narrative:       The following orders were created for panel order CBC & Differential.  Procedure                               Abnormality         Status                     ---------                               -----------         ------                     CBC Auto Differential[[id number]]        Abnormal            Final result                 Please view results for these tests on the individual orders.    Comprehensive Metabolic Panel [[id number]]  (Abnormal) Collected:  07/06/18 1255    Specimen:  Blood Updated:  07/06/18 1345     Glucose 113 (H) mg/dL      BUN 26 (H) mg/dL      Creatinine 1.23 (H) mg/dL      Sodium 141 mmol/L      Potassium 3.8 mmol/L      Chloride 99 mmol/L      CO2 30.7 (H) mmol/L      Calcium 8.1 (L) mg/dL      Total Protein 7.1 g/dL      Albumin 4.20 g/dL      ALT (SGPT) 17 U/L      AST (SGOT) 20 U/L      Alkaline Phosphatase 94 U/L      Total Bilirubin 0.5 mg/dL      eGFR Non African Amer 41 (L) mL/min/1.73      Globulin 2.9 gm/dL      A/G Ratio 1.4 g/dL      BUN/Creatinine Ratio 21.1     Anion Gap 11.3 mmol/L     Narrative:       The MDRD GFR formula is only valid for adults with stable renal function between ages 18 and 70.    Protime-INR [[id number]]  (Abnormal) Collected:  07/06/18 1255    Specimen:  Blood Updated:  07/06/18 1311     Protime 14.1 Seconds      INR 1.11 (H)    CBC Auto Differential [[id number]]  (Abnormal) Collected:  07/06/18 1255    Specimen:  Blood Updated:  07/06/18 1302     WBC 8.78 10*3/mm3      RBC 4.31 10*6/mm3      Hemoglobin 12.4 g/dL      Hematocrit 37.8 %      MCV 87.7 fL      MCH 28.8 pg      MCHC 32.8 g/dL      RDW 15.8 (H) %      RDW-SD 50.9  fl      MPV 12.2 (H) fL      Platelets 185 10*3/mm3      Neutrophil % 73.1 %      Lymphocyte % 11.6 (L) %      Monocyte % 8.0 %      Eosinophil % 6.8 (H) %      Basophil % 0.5 %      Immature Grans % 0.2 %      Neutrophils, Absolute 6.42 10*3/mm3      Lymphocytes, Absolute 1.02 10*3/mm3      Monocytes, Absolute 0.70 10*3/mm3      Eosinophils, Absolute 0.60 10*3/mm3      Basophils, Absolute 0.04 10*3/mm3      Immature Grans, Absolute 0.02 10*3/mm3           I ordered the above labs and reviewed the results    RADIOLOGY  CT Angiogram Head With & Without Contrast    (Results Pending)   CT Angiogram Neck With & Without Contrast    (Results Pending)   CT Cerebral Perfusion With & Without Contrast    (Results Pending)        I ordered the above noted radiological studies. Interpreted by radiologist. Reviewed by me in PACS.       PROCEDURES  Critical Care  Performed by: THOMAS ANTHONY  Authorized by: THOMAS ANTHONY     Critical care provider statement:     Critical care time (minutes):  35    Critical care time was exclusive of:  Separately billable procedures and treating other patients and teaching time    Critical care was necessary to treat or prevent imminent or life-threatening deterioration of the following conditions:  CNS failure or compromise    Critical care was time spent personally by me on the following activities:  Development of treatment plan with patient or surrogate, discussions with consultants, discussions with primary provider, evaluation of patient's response to treatment, examination of patient, ordering and review of laboratory studies, ordering and review of radiographic studies, ordering and performing treatments and interventions and re-evaluation of patient's condition        EKG           EKG time: 1320  Rhythm/Rate: NSR, 68  P waves and TX: first degree AV block  QRS, axis: incomplete LBBB   ST and T waves: nml     Interpreted Contemporaneously by me, independently viewed and is unchanged  compared to prior 07/03/18.      PROGRESS AND CONSULTS  1248 Called Team D. Pt is not a tPA candidate d/t the onset being greater than six hours.    1249 Received a call from Dr. Timmons, neurology, and discussed pt's case. Dr. Timmons advised that CT Cerebral Perfusion, CT Angio Head, and CT Angio Neck be ordered for further evaluation.    1254 Ordered CT Cerebral Perfusion, CT Angio Head, CT Angio Neck, and blood work for further evaluation. Also ordered IVF for hydration.    1338 Rechecked pt. Dr. Timmons is bedside. He discussed with pt's family the imaging findings that show a CVA d/t occlusion of the left middle cerebral artery. Dr. Timmons is trying to get in contact with Dr. Solis to see if a thrombectomy is needed.    1356 Dr. Solis is bedside. Dr. Timmons and Dr. Solis are discussing the plan of care. Dr. Timmons stated that a thrombectomy will not be performed and that the pt is a DNI/DNR.    1412 Placed call to [facility].    1441 Dr. Walker, [facility], is in the ED. Discussed pt's case. She agreed to admit the pt to tele for further evaluation.    1442 Ordered Platelet Response Asprin, per Dr. Walker's request.      MEDICAL DECISION MAKING  Results were reviewed/discussed with the patient and they were also made aware of online access. Pt also made aware that some labs, such as cultures, will not be resulted during ER visit and follow up with PMD is necessary.     MDM  Number of Diagnoses or Management Options  Cerebrovascular accident (CVA) due to occlusion of left middle cerebral artery (CMS/HCC):      Amount and/or Complexity of Data Reviewed  Clinical lab tests: ordered and reviewed (Unremarkable)  Tests in the radiology section of CPT®: ordered and reviewed (CT Head shows that there is an occulusion of the left middle cerebral artery. )  Tests in the medicine section of CPT®: reviewed and ordered (See procedure notes for EKG interpretation.)  Decide to obtain previous medical records or to obtain history from someone other  than the patient: yes  Obtain history from someone other than the patient: yes (Pt's family)  Review and summarize past medical records: yes (The patient was seen in the ED on 07/03/18 for HTN, tachycardia, and LBBB.)  Discuss the patient with other providers: yes (Dr. Timmons, neurology, Dr. Solis, neurosurgery, and Dr. Walker, [facility])  Independent visualization of images, tracings, or specimens: yes    Patient Progress  Patient progress: stable         DIAGNOSIS  Final diagnoses:   Cerebrovascular accident (CVA) due to occlusion of left middle cerebral artery (CMS/HCC)       DISPOSITION  ADMISSION TO [facility]    Discussed treatment plan and reason for admission with pt/family and admitting physician.  Pt/family voiced understanding of the plan for admission for further testing/treatment as needed.       Latest Documented Vital Signs:  As of 2:43 PM  BP- (!) 187/83 HR- 66 Temp- 97.5 °F (36.4 °C) (Tympanic) O2 sat- 97%    --  Documentation assistance provided by dima Gardner for Dr. Fisher.  Information recorded by the scribe was done at my direction and has been verified and validated by me.     Lakeshia Gardner  07/06/18 8381       Jerman Fisher MD  07/06/18 9799

## 2018-07-06 NOTE — PROGRESS NOTES
Clinical Pharmacy Services: Medication History    Meggan Huynh is a 88 y.o. female presenting to [facility] for   Chief Complaint   Patient presents with   • Altered Mental Status     since last night at 2000   • Unable to speak       She  has a past medical history of Ankle pain, right; Anxiety; CHF (congestive heart failure) (CMS/[facility]); Chronic renal disease, stage 3, moderately decreased glomerular filtration rate between 30-59 mL/min/1.73 square meter; COPD (chronic obstructive pulmonary disease) (CMS/[facility]); Coronary artery disease; Depression; Hyperlipidemia; Hypertension; Hypothyroidism; and Stroke (CMS/[facility]).    Allergies as of 07/06/2018 - Reviewed 07/06/2018   Allergen Reaction Noted   • Codeine  04/11/2016   • Hydrocodone  04/11/2016   • Keflex [cephalexin] Hives 04/11/2016   • Levaquin [levofloxacin in d5w]  04/11/2016       Medication information was obtained from: Medication list, family member  Pharmacy and Phone Number: [facility] [phone]    Prior to Admission Medications     Prescriptions Last Dose Informant Patient Reported? Taking?    azelastine (ASTELIN) 0.1 % nasal spray  Family Member Yes Yes    1 spray into each nostril Daily. Mixed with Flunisolide and Ipratropium    Calcium 500-100 MG-UNIT chewable tablet  Family Member Yes Yes    Chew 1 tablet Daily Before Lunch.    carvedilol (COREG) 3.125 MG tablet  Family Member Yes Yes    Take 6.25 mg by mouth Every Morning. Hold for blood pressure <100    carvedilol (COREG) 3.125 MG tablet  Family Member Yes Yes    Take 3.125 mg by mouth Every Evening.    cetirizine (ZyrTEC) 10 MG tablet  Family Member Yes Yes    Take 10 mg by mouth daily.    Cholecalciferol (VITAMIN D) 2000 UNITS tablet  Family Member Yes Yes    Take 2,000 Units by mouth Every Morning.    clopidogrel (PLAVIX) 75 MG tablet  Family Member Yes Yes    Take 75 mg by mouth Daily.    escitalopram (LEXAPRO) 10 MG tablet  Family Member Yes Yes    Take 10 mg by mouth Every Night.     flunisolide (NASALIDE) 25 MCG/ACT (0.025%) solution nasal spray  Family Member Yes Yes    Inhale 1 spray Daily. Mix with Azelastine and Ipratropium    Fluticasone Furoate-Vilanterol (BREO ELLIPTA) 100-25 MCG/INH aerosol powder   Family Member No No    One inhalation daily    fluticasone-salmeterol (ADVAIR DISKUS) 250-50 MCG/DOSE DISKUS  Family Member Yes Yes    Inhale 1 puff Daily.    furosemide (LASIX) 20 MG tablet  Family Member Yes Yes    Take 20 mg by mouth Every Morning.    furosemide (LASIX) 20 MG tablet  Family Member Yes Yes    Take 10 mg by mouth Every Evening.    ipratropium (ATROVENT) 0.03 % nasal spray  Family Member Yes Yes    1 spray into each nostril Daily. Mix with Azelastine and Flunisolide    levothyroxine (SYNTHROID, LEVOTHROID) 50 MCG tablet  Family Member Yes Yes    Take 50 mcg by mouth Daily.    O2 (OXYGEN)  Family Member Yes Yes    Inhale 2 L/min Every Night.    pravastatin (PRAVACHOL) 40 MG tablet  Family Member Yes Yes    Take 40 mg by mouth Every Night.    Umeclidinium Bromide (INCRUSE ELLIPTA) 62.5 MCG/INH aerosol powder   Family Member No Yes    Inhale 1 puff Daily.    zafirlukast (ACCOLATE) 20 MG tablet  Family Member Yes Yes    Take 20 mg by mouth Daily As Needed.            Medication notes: Removed per patient list:  Breo-added Advair  Multivitamin-stopped per patient list    Added: Ipratropium and Flunisolide. Per patient's family she mixes both with Azelastine.    This medication list is complete to the best of my knowledge as of 7/6/2018    Please call if questions.    Kriss Gabriel, Medication History Technician  7/6/2018 3:56 PM

## 2018-07-06 NOTE — PROGRESS NOTES
Pharmacy consult to review medications associated with hyperkinetic movements.     Have reviewed the current active inpatient medications and none have been found to be associated with hyperkinetic movement.     Current Facility-Administered Medications:   •  aspirin tablet 325 mg, 325 mg, Oral, Daily **OR** aspirin suppository 300 mg, 300 mg, Rectal, Daily, Keshia Timmons MD  •  atorvastatin (LIPITOR) tablet 80 mg, 80 mg, Oral, Nightly, Keshia Timmons MD  •  azelastine (ASTELIN) nasal spray 1 spray, 1 spray, Each Nare, Daily, Nayana Walker MD  •  bisacodyl (DULCOLAX) suppository 10 mg, 10 mg, Rectal, Daily PRN, Nayana Walker MD  •  budesonide-formoterol (SYMBICORT) 160-4.5 MCG/ACT inhaler 2 puff, 2 puff, Inhalation, BID - RT, Nayana Walker MD  •  calcium carbonate (TUMS) chewable tablet 500 mg (200 mg elemental), 1 tablet, Oral, BID PRN, Nayana Walker MD  •  [START ON 7/7/2018] calcium carbonate (TUMS) chewable tablet 500 mg (200 mg elemental), 2 tablet, Oral, Daily, Nayana Walker MD  •  carvedilol (COREG) tablet 3.125 mg, 3.125 mg, Oral, Q PM, Nayana Walker MD  •  cetirizine (zyrTEC) tablet 5 mg, 5 mg, Oral, Daily, Nayana Walker MD  •  [START ON 7/7/2018] cholecalciferol (VITAMIN D3) tablet 2,000 Units, 2,000 Units, Oral, QAM, Nayana Walker MD  •  clopidogrel (PLAVIX) tablet 75 mg, 75 mg, Oral, Daily, Nayana Walker MD  •  escitalopram (LEXAPRO) tablet 10 mg, 10 mg, Oral, Nightly, Nayana Walker MD  •  fluticasone (FLONASE) 50 MCG/ACT nasal spray 1 spray, 1 spray, Each Nare, Daily, Nayana Walker MD  •  ipratropium (ATROVENT) nasal spray 0.03%, 1 spray, Each Nare, Daily, Nayana Walker MD  •  ipratropium-albuterol (DUO-NEB) nebulizer solution 3 mL, 3 mL, Nebulization, 4x Daily - RT, Nayana Walker MD  •  levothyroxine (SYNTHROID, LEVOTHROID) tablet 50 mcg, 50 mcg, Oral, Daily, Nayana Álvarez  MD Aaron  •  magnesium hydroxide (MILK OF MAGNESIA) suspension 2400 mg/10mL 10 mL, 10 mL, Oral, Daily PRN, Nayana Walker MD  •  O2 (OXYGEN), 2 L/min, Inhalation, Nightly, Nayana Walker MD  •  ondansetron (ZOFRAN) tablet 4 mg, 4 mg, Oral, Q6H PRN **OR** ondansetron ODT (ZOFRAN-ODT) disintegrating tablet 4 mg, 4 mg, Oral, Q6H PRN **OR** ondansetron (ZOFRAN) injection 4 mg, 4 mg, Intravenous, Q6H PRN, Nayana Walker MD  •  Pharmacy Consult, , Does not apply, Continuous PRN, Nayana Walker MD  •  sodium chloride 0.9 % flush 1-10 mL, 1-10 mL, Intravenous, PRN, Keshia Timmons MD  •  Insert peripheral IV, , , Once **AND** sodium chloride 0.9 % flush 10 mL, 10 mL, Intravenous, PRN, Jerman Fisher MD

## 2018-07-06 NOTE — CONSULTS
Encounter Date: 7/6/2018    CHIEF COMPLAINT: Speech difficulty and altered mental status    Patient Active Problem List   Diagnosis   • Cerebrovascular accident (CVA) due to occlusion of left middle cerebral artery (CMS/HCC)       PRESENT ILLNESS:    Portions of this notes is copied from previous physician encounters, reviewed and edited appropriately.    Background:     Meggan Huynh is a 88 y.o. female with previous history of congestive heart failure, chronic kidney disease, COPD, coronary artery disease, hypertension and dyslipidemia now admitted with sudden onset of speech difficulty.  Patient woke up with these symptoms.  Patient last known normal was 8 PM last night.  On arrival in the emergency room patient had an NIH of 10.  Family present by the bedside.  Patient had old CVA affecting her speech in the past.  Currently patient is nonverbal and poor historian.  Her CT angiogram demonstrated perfusion mismatch in the left MCA territory and also left M2 occlusion.  Neurosurgery has evaluated the patient and deemed that this patient is not a candidate, and family agrees with the same.  Patient is not a TPA candidate given the time window of more than 4 and half hours.  Patient will be admitted and worked up for stroke.  Patient is on Plavix at home.  Patient was recently discharged from this hospital a few days back for uncontrolled blood pressure.    Review of systems   Cannot review systems because of altered mental status        Past Medical History:   Diagnosis Date   • Ankle pain, right    • Anxiety    • CHF (congestive heart failure) (CMS/HCC)     with preserved EF   • Chronic renal disease, stage 3, moderately decreased glomerular filtration rate between 30-59 mL/min/1.73 square meter    • COPD (chronic obstructive pulmonary disease) (CMS/HCC)    • Coronary artery disease    • Depression    • Hyperlipidemia    • Hypertension    • Hypothyroidism    • Stroke (CMS/HCC)        History reviewed. No  pertinent surgical history.    Current Facility-Administered Medications   Medication Dose Route Frequency Provider Last Rate Last Dose   • aspirin tablet 325 mg  325 mg Oral Daily Keshia Timmons MD        Or   • aspirin suppository 300 mg  300 mg Rectal Daily Keshia Timmons MD       • atorvastatin (LIPITOR) tablet 80 mg  80 mg Oral Nightly Keshia Timmons MD       • sodium chloride 0.9 % flush 1-10 mL  1-10 mL Intravenous PRN Keshia Timmons MD       • sodium chloride 0.9 % flush 10 mL  10 mL Intravenous PRN Jerman Fisher MD           Allergies   Allergen Reactions   • Codeine    • Hydrocodone    • Keflex [Cephalexin] Hives   • Levaquin [Levofloxacin In D5w]        Social History     Social History   • Marital status: Single     Spouse name: N/A   • Number of children: N/A   • Years of education: N/A     Occupational History   • Not on file.     Social History Main Topics   • Smoking status: Former Smoker   • Smokeless tobacco: Not on file   • Alcohol use No   • Drug use: Unknown   • Sexual activity: Defer     Other Topics Concern   • Not on file     Social History Narrative   • No narrative on file       History reviewed. No pertinent family history.    Family Status   Relation Status   • Mother    • Father        No current facility-administered medications on file prior to encounter.      Current Outpatient Prescriptions on File Prior to Encounter   Medication Sig   • azelastine (ASTELIN) 0.1 % nasal spray 1 spray into each nostril Daily. Mixed with Flunisolide and Ipratropium   • cetirizine (ZyrTEC) 10 MG tablet Take 10 mg by mouth daily.   • Cholecalciferol (VITAMIN D) 2000 UNITS tablet Take 2,000 Units by mouth Every Morning.   • escitalopram (LEXAPRO) 10 MG tablet Take 10 mg by mouth Every Night.   • Umeclidinium Bromide (INCRUSE ELLIPTA) 62.5 MCG/INH aerosol powder  Inhale 1 puff Daily.   • Fluticasone Furoate-Vilanterol (BREO ELLIPTA) 100-25 MCG/INH aerosol powder  One  inhalation daily   • [DISCONTINUED] calcium carbonate (TUMS) 500 MG chewable tablet Chew 1 tablet daily.   • [DISCONTINUED] CALCIUM CARBONATE-VIT D-MIN PO Take  by mouth.   • [DISCONTINUED] carvedilol (COREG) 3.125 MG tablet Take 1 tablet by mouth See Admin Instructions. Take 2 tablets in the morning (hold for blood pressure <100) and one tablet in the evening.   • [DISCONTINUED] clopidogrel (PLAVIX) 75 MG tablet TAKE ONE TABLET BY MOUTH DAILY   • [DISCONTINUED] escitalopram (LEXAPRO) 10 MG tablet TAKE ONE TABLET BY MOUTH DAILY   • [DISCONTINUED] fluticasone (FLONASE) 50 MCG/ACT nasal spray 1 spray into each nostril.   • [DISCONTINUED] furosemide (LASIX) 20 MG tablet Take 20 mg by mouth. 1 am and 1/2 pm   • [DISCONTINUED] furosemide (LASIX) 20 MG tablet TAKE ONE TABLET BY MOUTH TWICE A DAY   • [DISCONTINUED] levothyroxine (SYNTHROID, LEVOTHROID) 50 MCG tablet TAKE ONE TABLET BY MOUTH DAILY   • [DISCONTINUED] Multiple Vitamin (MULTI-VITAMIN DAILY PO) Take  by mouth.   • [DISCONTINUED] OXYGEN-HELIUM IN Inhale.   • [DISCONTINUED] pravastatin (PRAVACHOL) 40 MG tablet TAKE ONE TABLET BY MOUTH DAILY   • [DISCONTINUED] PROAIR  (90 BASE) MCG/ACT inhaler    • [DISCONTINUED] zafirlukast (ACCOLATE) 20 MG tablet TAKE 1 TABLET BY MOUTH TWO TIMES A DAY 1 HOUR PRIOR TO MEALS           PHYSICAL EXAMINATION:    Vitals: Patient Vitals for the past 4 hrs:   BP Temp Temp src Pulse Resp SpO2   07/06/18 1625 107/66 98 °F (36.7 °C) Oral 70 18 94 %   07/06/18 1559 - - - 107 - 93 %   07/06/18 1548 - - - 103 - 93 %   07/06/18 1539 - - - 59 - 95 %   07/06/18 1534 (!) 186/84 - - 63 - 96 %   07/06/18 1527 - - - 60 - 94 %   07/06/18 1516 - - - 58 - 96 %   07/06/18 1504 (!) 200/83 - - 57 - 98 %   07/06/18 1328 (!) 187/83 - - 66 18 97 %     Temp:  [97.5 °F (36.4 °C)-98 °F (36.7 °C)] 98 °F (36.7 °C)  Heart Rate:  [] 70  Resp:  [16-18] 18  BP: (107-200)/(66-84) 107/66    General:  pleasant in no acute distress.  HEENT: No pallor or  icterus. Neck supple. No Carotid bruits.  Heart: S1 and S2 normal with regular rhythm.  No murmur.       GENERAL NEUROLOGIC EXAM     NIHSS     Level Of Consciousness 0   0=Alert; keenly responsive 1=Not alert, but arousable by minor stimulation 2=Not alert, requires repeated stimulation 3=Responds only with reflex movements     LOC Questions to Month and age 2  0=Answers both questions correctly 1=Answers one question correctly 2=Answers neither question correctly     LOC Commands -Open/Close eyes -Open/close  2   0=Performs both tasks correctly 1=Performs one task correctly 2=Performs neighter task correctly     Best Gaze 0   0=Normal 1=Partial gaze palsy 2=Forced deviation, or total gaze paresis     Visual 1   0=No visual loss 1=Partial hemianopia 2=Complete hemianopia 3=Bilateral hemianopia (blind including cortical blindness)     Facial Palsy 1   0=Normal symmetrical movement 1=Minor paralysis (asymmetry) 2=Partial paralysis (lower facde) 3=Complete paralysis (upper and lower face)     Motor: Arm and Leg 0   0=No drift, limb holds posture for full 10 seconds 1=Drift, limb holds posture, no drift to bed 2=Some antigravity effort, cannot maintain posture, drifts to bed 3=No effort against gravity, limb falls 4=No movement     Limb Ataxia 0   0=Absent 1=Present in one limb 2=Present in two limbs     Sensory 0   1=Normal 2=Mild to moderate sensory loss 3=Severe to total sensory loss     Best Language 3   0=No aphasia, normal 1=Mild to moderate aphasia 2=Mute, global aphasia 3=Multe, global aphasia     Dysarthria 0   0=Normal 1=Mild to moderate 2=Severe, unintelligible or mute/anarthric     Extinction/Neglect 1   0=No abnormality 1=Extinction to bilateral simultaneous stimulation 2=Profound neglect     Total  10         Labs:     Lab Results   Component Value Date    HGB 12.4 07/06/2018    HCT 37.8 07/06/2018    WBC 8.78 07/06/2018     07/06/2018     Lab Results   Component Value Date    BUN 26 (H)  07/06/2018    CALCIUM 8.1 (L) 07/06/2018     07/06/2018    K 3.8 07/06/2018    CL 99 07/06/2018    CO2 30.7 (H) 07/06/2018     Lab Results   Component Value Date    ALT 17 07/06/2018    AST 20 07/06/2018    BILITOT 0.5 07/06/2018     Lab Results   Component Value Date    MG 2.1 07/03/2018    PROTIME 14.1 07/06/2018    INR 1.11 (H) 07/06/2018     No components found for: POCGLUC  No components found for: A1C  Lab Results   Component Value Date    HDL 52 10/19/2017    LDL 48 10/19/2017     No components found for: B12  Lab Results   Component Value Date    TSH 2.980 07/03/2018       Lab Results (last 24 hours)     Procedure Component Value Units Date/Time    Platelet Response Aspirin [[id number]] Collected:  07/06/18 1516    Specimen:  Blood Updated:  07/06/18 1615     ASA Platelet Inhibition 392 ARU     Narrative:       Test results are reported in Aspirin Reaction Units (ARU)  350-549 ARU-  Platelet dysfunction consistant with aspirin has been detected  550-700 ARU-  Platelet dysfunction consistant with aspirin has not been detected  Results should be interpreted in conjuction with other laboratory and clinical data available to the clinician    P2Y12 Platelet Inhibition [[id number]]  (Normal) Collected:  07/06/18 1512    Specimen:  Blood Updated:  07/06/18 1604     P2Y12 Platelet Inhibition 195 PRU     Narrative:       Test results are in P2Y12 reaction units (PRU). This measures the extent of platelet aggregation in the presence of P2Y12 inhibitor drugs such as clopidrogrel (Plavix), prasugrel (Effient), ticagrelor (Brilinta), and ticlopidine (Ticlid).   Pre-Drug normal reference range: 194 - 418 PRU   P2Y12 values <194 (low end of reference range) are specific evidence of a P2Y12 inhibitor effect   Patients who have been treated with Glycoprotein IIb/IIIa inhibitors should not be tested until platelet function has recovered. This time period is approximately 14 days after discontinuation of abciximab (ReoPro)  and up to 48 hours after discontinuation of eptifibatide (Integrillin) and tirofiban (Aggratstat).   Results should be interpreted in conjuction with other laboratory and clinical data available to the clinician.    Comprehensive Metabolic Panel [[id number]]  (Abnormal) Collected:  07/06/18 1255    Specimen:  Blood Updated:  07/06/18 1345     Glucose 113 (H) mg/dL      BUN 26 (H) mg/dL      Creatinine 1.23 (H) mg/dL      Sodium 141 mmol/L      Potassium 3.8 mmol/L      Chloride 99 mmol/L      CO2 30.7 (H) mmol/L      Calcium 8.1 (L) mg/dL      Total Protein 7.1 g/dL      Albumin 4.20 g/dL      ALT (SGPT) 17 U/L      AST (SGOT) 20 U/L      Alkaline Phosphatase 94 U/L      Total Bilirubin 0.5 mg/dL      eGFR Non African Amer 41 (L) mL/min/1.73      Globulin 2.9 gm/dL      A/G Ratio 1.4 g/dL      BUN/Creatinine Ratio 21.1     Anion Gap 11.3 mmol/L     Narrative:       The MDRD GFR formula is only valid for adults with stable renal function between ages 18 and 70.    Protime-INR [[id number]]  (Abnormal) Collected:  07/06/18 1255    Specimen:  Blood Updated:  07/06/18 1311     Protime 14.1 Seconds      INR 1.11 (H)    POC Glucose Once [[id number]]  (Normal) Collected:  07/06/18 1251    Specimen:  Blood Updated:  07/06/18 1310     Glucose 105 mg/dL     Narrative:       Meter: [id number] : [id number] [facility]    CBC & Differential [[id number]] Collected:  07/06/18 1255    Specimen:  Blood Updated:  07/06/18 1302    Narrative:       The following orders were created for panel order CBC & Differential.  Procedure                               Abnormality         Status                     ---------                               -----------         ------                     CBC Auto Differential[[id number]]        Abnormal            Final result                 Please view results for these tests on the individual orders.    CBC Auto Differential [[id number]]  (Abnormal) Collected:  07/06/18 1255    Specimen:   Blood Updated:  07/06/18 1302     WBC 8.78 10*3/mm3      RBC 4.31 10*6/mm3      Hemoglobin 12.4 g/dL      Hematocrit 37.8 %      MCV 87.7 fL      MCH 28.8 pg      MCHC 32.8 g/dL      RDW 15.8 (H) %      RDW-SD 50.9 fl      MPV 12.2 (H) fL      Platelets 185 10*3/mm3      Neutrophil % 73.1 %      Lymphocyte % 11.6 (L) %      Monocyte % 8.0 %      Eosinophil % 6.8 (H) %      Basophil % 0.5 %      Immature Grans % 0.2 %      Neutrophils, Absolute 6.42 10*3/mm3      Lymphocytes, Absolute 1.02 10*3/mm3      Monocytes, Absolute 0.70 10*3/mm3      Eosinophils, Absolute 0.60 10*3/mm3      Basophils, Absolute 0.04 10*3/mm3      Immature Grans, Absolute 0.02 10*3/mm3           .  Imaging Results (last 24 hours)     Procedure Component Value Units Date/Time    CT Angiogram Head With & Without Contrast [[id number]] Collected:  07/06/18 1539     Updated:  07/06/18 1539    Narrative:       EMERGENCY CONTRAST-ENHANCED CT ANGIOGRAM OF HEAD AND NECK AND CT  PERFUSION STUDY OF THE HEAD 07/06/2018      CLINICAL HISTORY: Acute onset confusion, right facial weakness, speech  abnormality, acute stroke.     TECHNIQUE: Initially spiral CT images were obtained from the base of the  skull to the vertex without intravenous contrast and images were  reformatted and submitted in 3 mm thick axial CT section, 2 mm thick  sagittal and coronal reconstructions were performed and images are  submitted in brain algorithm.     FINDINGS ON NONCONTRAST HEAD CT: There is a moderate sized area of  encephalomalacia extending from the posterior superior left temporal  lobe into the lateral left parietal lobe. The area measures up to 3.6 x  3.5 x 4.5 cm in maximal medial lateral anterior posterior and  craniocaudal dimension, distribution of temporoparietal branches of the  posterior-inferior division left middle cerebral artery territory.  Furthermore, just anterior to this is a more subtle area of parenchymal  low-density that involves the anterior mid left  corona radiata region  and extends into the superior left putamen, involves the left external  capsule, the entire left insular cortex and extends into the mid and  inferior lateral left frontal lobe. This is compatible with an early  acute infarct in the anterior-superior division left middle cerebral  artery territory that measures up to 6.5 x 3.3 x 4 cm. There is no  hemorrhagic transformation, no intracranial hemorrhage seen. There is  mild low-density in the frontal periventricular white matter compatible  with mild small vessel disease. Ventricles are normal in size. There is  no midline shift, no extraaxial fluid collections are identified. Once  again, no acute intracranial hemorrhage is seen. There is very extensive  pansinus opacification with near complete opacification of frontal  ethmoid maxillary and sphenoid sinuses with fluid and mucosal thickening  bilaterally. Mastoid and middle ear cavities are clear.       Impression:          1. There is a moderate sized old infarct extending throughout the  lateral left parietal lobe into the posterior superior lateral left  temporal lobe and posterior left insular cortex and this old infarct in  the posterior-inferior division left middle cerebral artery territory  maximally measures up to 3.6 x 3.5 x 4 cm medial lateral, anterior  posterior and craniocaudal dimension, and this infarct occurred back in  June 2008, 10 years ago.     2. There is subtle parenchymal edema most consistent with an area of  early acute infarction involving the mid and inferior lateral left  frontal lobe, the anterior mid left corona radiata region, superior  lateral putamen, entire left external capsule and left insular cortex,  measures 6.5 x 3.3 x 4 cm in anterior posterior, medial lateral and  craniocaudal dimension.  This acute infarct is in the anterior-superior  division left middle cerebral artery territory and there is no  hemorrhagic transformation.     3. Extensive  pansinusitis as described.     4. The remainder of the head CT is unremarkable.     The results were communicated to Dr. Timmons while the patient was still  on the table 07/06/2018 at 12:55 PM and he requested a contrast-enhanced  CT angiogram of the head and neck and CT perfusion study of the head,  and subsequently spiral CT images were obtained through the head during  the arterial phase of contrast and images were reformatted and analyzed  with rapid software analysis for CT perfusion study of the head.  Subsequently, spiral CT images were obtained from the top of the aortic  arch up through the great vessels of the head and neck during the  arterial phase of contrast and images were reformatted and submitted in  1 mm thick axial CT section, 1 mm thick sagittal and coronal  reconstructions and 3D reconstructions were performed to complete the CT  perfusion study of the head. Finally, spiral CT images were obtained  from the base of the skull to vertex delayed following intravenous  contrast and these images were reformatted and submitted in brain  algorithm, 3 mm thick axial CT sections.     CT PERFUSION STUDY: There is a moderate sized area of delayed time to  T-max of greater than 6 seconds extending throughout the lateral left  frontal lobe into the left insular cortex. Volume of brain involved on  exam measures 49 cc and there is a smaller area of reduced cerebral  blood flow to less than 30% measuring 26 cc that involves mid and  inferior lateral left frontal lobe, as well as the left insular cortex  and external capsule, compatible with area of acute completed  infarction. The infarct appears slightly bigger on the noncontrast head  CT, but based on CT perfusion study the mismatch volume is 23 mm for the  brain.     CT ANGIOGRAM HEAD AND NECK FINDINGS: Again demonstrated is a  pansinusitis. The nasopharynx, oropharynx, hypopharynx, true cords, and  subglottic airway are normal in appearance. Thyroid gland,  right lobe is  normal in appearance, left lobe is smaller, may be partially surgically  absent or just atrophic. There are emphysematous changes in upper lung  zone lung apices bilaterally. There is an irregular cluster of small  patchy nodular opacity in the posterior inferior lateral left upper  lobe, anterior to the superior aspect of the major fissure that measures  2.8 x 1.4 cm, nonspecific, could be a focal inflammatory infiltrate. The  parotid, , parapharyngeal and submandibular spaces are  symmetric and are normal in appearance. There is lower cervical  spondylosis with disc space narrowing, degenerative endplate change,  posterior endplate spurring, and uncovertebral joint hypertrophy and  mild facet overgrowth contributing to mild canal and moderate bilateral  foraminal narrowing at C5-C6, mild canal and mild left and moderate  right bony foraminal narrowing at C6-C7.     CT angiogram images through the neck demonstrate extensive calcified  plaques throughout the aortic arch, calcified plaque severely narrows  the left subclavian artery origin. There are heavily calcified plaques  moderately narrowing the proximal aspect of the left subclavian artery.  There is calcified plaque that severely narrows the left vertebral  artery origin and there is a diseased multifocally stenotic small  diameter enhancing lumen to the left vertebral artery that loses its  enhancement in arterial phase as it extends superiorly within the  cervical spine and C1 ring level, loses all enhancement, may be occluded  or just be slow flow that does not fill during arterial phase, and then  the mid and distal intracranial segment of the left vertebral artery is  patent to the vertebrobasilar junction, possibly from a retrograde flow  the basilar artery. The left common carotid artery origin is moderately  narrowed by 50% by calcified plaques in the aortic arch, mid and distal  left common carotid artery is within normal  limits. There is a  circumferential noncalcified plaque that mildly narrows the left common  carotid bifurcation, extends into the origin of the left internal  carotid artery, but there is essentially no stenosis in left internal  carotid artery using the NASCET criteria. Brachycephalic artery origins  mildly narrowed by calcified plaque, but is otherwise patent to its  bifurcation. There is calcified plaque mildly narrowing the subclavian  artery origin. The right vertebral artery origin is only mildly  narrowed. The right vertebral artery is dominant vertebral artery and is  widely patent beyond its origin to the vertebrobasilar junction without  stenosis. The right common carotid origin is normal in appearance and no  stenosis seen in the right common carotid artery to its bifurcation  where there is a heavily calcified plaque that moderately narrows the  right common carotid bifurcation, extends into the origin of the  proximal aspect of the right internal carotid artery and appears to up  to severely narrow the origin of the right internal carotid artery by  greater than 70% using the NASCET criteria. There is moderate-to-severe  stenosis of the right external carotid origin. The remainder of the  right internal carotid artery is within normal limits.     The CT angiogram images through the head demonstrate a diseased upper  cervical left vertebral artery and no enhancement in the lumen of the  left vertebral artery from the C2 cervical level to its mid intracranial  segment where there is enhancement in the mid and distal intracranial  segment of the left vertebral artery, likely from retrograde flow from  the basilar artery to just proximal to PICA origin and the dominant  distal right vertebral artery is widely patent to the vertebrobasilar  junction. The basilar artery and basilar tip is normal in appearance.  There is a markedly hypoplastic P1 segment left posterior cerebral  artery with an area of   persistent fetal origin left posterior cerebral  artery off the back wall of the supracavernous left internal carotid  artery which is a normal anatomic variation. The posterior cerebral and  superior cerebellar arteries are normal in appearance. There are  calcified plaques mildly narrowing the cavernous segments of the  internal carotid arteries. Superior cavernous segments are widely patent  without stenosis to the carotid termini. The anterior cerebral arteries  and anterior communicating artery origin are normal in appearance. The  M1 segment of the right middle cerebral artery and right middle cerebral  artery trifurcation is normal in appearance. Extending from the very  distal M1 segment of the left middle cerebral artery into the origin and  proximal 5 mm of the anterior-superior division M2 segment is an  intraluminal filling defect felt to be thrombus, and there is some  extension into the origin proximal aspect of the posterior-inferior  division M2 segment by several millimeters, as well and there is good  filling of the mid and distal posterior-inferior division M2 segment and  then it bifurcates into the M3 branches and there is occlusion of the  left temporoparietal M3 branch of the posterior-inferior division,  likely a chronic occlusion that occurred at the time of the old infarct:     IMPRESSION:  1. There are heavily calcified plaques involving the aortic arch and  extending into the origins of the great vessels off the aortic arch.  Heavily calcified plaque severely narrows the left subclavian artery  origin, moderately narrows the proximal left subclavian artery. There is  a heavily calcified plaque that severely narrows the left vertebral  artery origin and the proximal and mid left vertebral arteries severely  diseased multifocally stenotic vessel with a tiny diameter enhancing  lumen that fades as it goes superiorly in the cervical spine and there  is nonenhancement in the left vertebral  lumen from C1-C2 cervical level  to its proximal intracranial segment where there is enhancement in the  intracranial segment of the left vertebral artery to the PICA origin,  post PICA segment is normal to the vertebrobasilar junction filling the  intracranial segments likely from retrograde flow from the basilar  artery.     2. Heavily calcified plaque moderate-to-severely narrows the right  common carotid bifurcation by 70%, extends into the origin and severely  narrows the origin of the right internal carotid artery by greater than  70% using the NASCET criteria and moderate-to-severely narrows the right  external carotid origin.     3. Extensive pansinusitis with subtotal opacification of the frontal and  ethmoid maxillary sinus and complete opacification of the sphenoid  sinuses bilaterally with fluid and mucosal thickening.     4. 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 The old infarct measures 3.6 x 3.5 x 4  cm.     5. On the noncontrast head CT images there are subtle parenchymal edema  most consistent with early acute infarction involving the mid to  inferior lateral left frontal lobe, involves portion of the anterior to  mid left corona radiata region, majority of the left external capsule  and left insular cortex, measures up to 6 x 3     in anterior posterior,  medial lateral and craniocaudal dimension. On the CT perfusion study of  the head there is a 49 cc volume of brain parenchyma that is ischemic  and in the center of it is a 26 cc volume of acute completed infarction  involving the mid and inferior lateral left frontal lobe extending to  the left external capsule and  insular cortex, suggesting that there is  some brain that is hypoperfused but not yet infarcted in the  anterior-superior division left middle cerebral artery territory.  Furthermore on the CT angiogram images of the head there is early M2  branch off the inferior aspect of the mid to distal M1 segment of the  left middle cerebral artery that is widely patent and there is an acute  embolus that involves a very distal aspect of the M1 segment of the left  middle cerebral artery extending the proximal 4-5 mm of the superior and  inferior division M2 segments of the left middle cerebral artery.  Furthermore, the mid and distal inferior-posterior division left middle  cerebral artery fills well with contrast but the temporoparietal M3  branch in the inferior division left middle cerebral artery territory  does not fill with contrast and is felt to be occluded and likely  chronically occluded as this leads to the old area of infarction in the  left temporoparietal region.     Results of the final dictated exam were communicated to Dr. Solis and  Dr. Timmons by telephone on 07/06/2018 at 1:50 PM. Radiation dose  reduction techniques were utilized, including automated exposure control  and exposure modulation based on body size.          CT Angiogram Neck With & Without Contrast [[id number]] Collected:  07/06/18 1539     Updated:  07/06/18 1539    Narrative:       EMERGENCY CONTRAST-ENHANCED CT ANGIOGRAM OF HEAD AND NECK AND CT  PERFUSION STUDY OF THE HEAD 07/06/2018      CLINICAL HISTORY: Acute onset confusion, right facial weakness, speech  abnormality, acute stroke.     TECHNIQUE: Initially spiral CT images were obtained from the base of the  skull to the vertex without intravenous contrast and images were  reformatted and submitted in 3 mm thick axial CT section, 2 mm thick  sagittal and coronal reconstructions were performed and images are  submitted in brain algorithm.     FINDINGS ON NONCONTRAST HEAD CT: There is a  moderate sized area of  encephalomalacia extending from the posterior superior left temporal  lobe into the lateral left parietal lobe. The area measures up to 3.6 x  3.5 x 4.5 cm in maximal medial lateral anterior posterior and  craniocaudal dimension, distribution of temporoparietal branches of the  posterior-inferior division left middle cerebral artery territory.  Furthermore, just anterior to this is a more subtle area of parenchymal  low-density that involves the anterior mid left corona radiata region  and extends into the superior left putamen, involves the left external  capsule, the entire left insular cortex and extends into the mid and  inferior lateral left frontal lobe. This is compatible with an early  acute infarct in the anterior-superior division left middle cerebral  artery territory that measures up to 6.5 x 3.3 x 4 cm. There is no  hemorrhagic transformation, no intracranial hemorrhage seen. There is  mild low-density in the frontal periventricular white matter compatible  with mild small vessel disease. Ventricles are normal in size. There is  no midline shift, no extraaxial fluid collections are identified. Once  again, no acute intracranial hemorrhage is seen. There is very extensive  pansinus opacification with near complete opacification of frontal  ethmoid maxillary and sphenoid sinuses with fluid and mucosal thickening  bilaterally. Mastoid and middle ear cavities are clear.       Impression:          1. There is a moderate sized old infarct extending throughout the  lateral left parietal lobe into the posterior superior lateral left  temporal lobe and posterior left insular cortex and this old infarct in  the posterior-inferior division left middle cerebral artery territory  maximally measures up to 3.6 x 3.5 x 4 cm medial lateral, anterior  posterior and craniocaudal dimension, and this infarct occurred back in  June 2008, 10 years ago.     2. There is subtle parenchymal edema most  consistent with an area of  early acute infarction involving the mid and inferior lateral left  frontal lobe, the anterior mid left corona radiata region, superior  lateral putamen, entire left external capsule and left insular cortex,  measures 6.5 x 3.3 x 4 cm in anterior posterior, medial lateral and  craniocaudal dimension.  This acute infarct is in the anterior-superior  division left middle cerebral artery territory and there is no  hemorrhagic transformation.     3. Extensive pansinusitis as described.     4. The remainder of the head CT is unremarkable.     The results were communicated to Dr. Timmons while the patient was still  on the table 07/06/2018 at 12:55 PM and he requested a contrast-enhanced  CT angiogram of the head and neck and CT perfusion study of the head,  and subsequently spiral CT images were obtained through the head during  the arterial phase of contrast and images were reformatted and analyzed  with rapid software analysis for CT perfusion study of the head.  Subsequently, spiral CT images were obtained from the top of the aortic  arch up through the great vessels of the head and neck during the  arterial phase of contrast and images were reformatted and submitted in  1 mm thick axial CT section, 1 mm thick sagittal and coronal  reconstructions and 3D reconstructions were performed to complete the CT  perfusion study of the head. Finally, spiral CT images were obtained  from the base of the skull to vertex delayed following intravenous  contrast and these images were reformatted and submitted in brain  algorithm, 3 mm thick axial CT sections.     CT PERFUSION STUDY: There is a moderate sized area of delayed time to  T-max of greater than 6 seconds extending throughout the lateral left  frontal lobe into the left insular cortex. Volume of brain involved on  exam measures 49 cc and there is a smaller area of reduced cerebral  blood flow to less than 30% measuring 26 cc that involves mid  and  inferior lateral left frontal lobe, as well as the left insular cortex  and external capsule, compatible with area of acute completed  infarction. The infarct appears slightly bigger on the noncontrast head  CT, but based on CT perfusion study the mismatch volume is 23 mm for the  brain.     CT ANGIOGRAM HEAD AND NECK FINDINGS: Again demonstrated is a  pansinusitis. The nasopharynx, oropharynx, hypopharynx, true cords, and  subglottic airway are normal in appearance. Thyroid gland, right lobe is  normal in appearance, left lobe is smaller, may be partially surgically  absent or just atrophic. There are emphysematous changes in upper lung  zone lung apices bilaterally. There is an irregular cluster of small  patchy nodular opacity in the posterior inferior lateral left upper  lobe, anterior to the superior aspect of the major fissure that measures  2.8 x 1.4 cm, nonspecific, could be a focal inflammatory infiltrate. The  parotid, , parapharyngeal and submandibular spaces are  symmetric and are normal in appearance. There is lower cervical  spondylosis with disc space narrowing, degenerative endplate change,  posterior endplate spurring, and uncovertebral joint hypertrophy and  mild facet overgrowth contributing to mild canal and moderate bilateral  foraminal narrowing at C5-C6, mild canal and mild left and moderate  right bony foraminal narrowing at C6-C7.     CT angiogram images through the neck demonstrate extensive calcified  plaques throughout the aortic arch, calcified plaque severely narrows  the left subclavian artery origin. There are heavily calcified plaques  moderately narrowing the proximal aspect of the left subclavian artery.  There is calcified plaque that severely narrows the left vertebral  artery origin and there is a diseased multifocally stenotic small  diameter enhancing lumen to the left vertebral artery that loses its  enhancement in arterial phase as it extends superiorly within  the  cervical spine and C1 ring level, loses all enhancement, may be occluded  or just be slow flow that does not fill during arterial phase, and then  the mid and distal intracranial segment of the left vertebral artery is  patent to the vertebrobasilar junction, possibly from a retrograde flow  the basilar artery. The left common carotid artery origin is moderately  narrowed by 50% by calcified plaques in the aortic arch, mid and distal  left common carotid artery is within normal limits. There is a  circumferential noncalcified plaque that mildly narrows the left common  carotid bifurcation, extends into the origin of the left internal  carotid artery, but there is essentially no stenosis in left internal  carotid artery using the NASCET criteria. Brachycephalic artery origins  mildly narrowed by calcified plaque, but is otherwise patent to its  bifurcation. There is calcified plaque mildly narrowing the subclavian  artery origin. The right vertebral artery origin is only mildly  narrowed. The right vertebral artery is dominant vertebral artery and is  widely patent beyond its origin to the vertebrobasilar junction without  stenosis. The right common carotid origin is normal in appearance and no  stenosis seen in the right common carotid artery to its bifurcation  where there is a heavily calcified plaque that moderately narrows the  right common carotid bifurcation, extends into the origin of the  proximal aspect of the right internal carotid artery and appears to up  to severely narrow the origin of the right internal carotid artery by  greater than 70% using the NASCET criteria. There is moderate-to-severe  stenosis of the right external carotid origin. The remainder of the  right internal carotid artery is within normal limits.     The CT angiogram images through the head demonstrate a diseased upper  cervical left vertebral artery and no enhancement in the lumen of the  left vertebral artery from the C2  cervical level to its mid intracranial  segment where there is enhancement in the mid and distal intracranial  segment of the left vertebral artery, likely from retrograde flow from  the basilar artery to just proximal to PICA origin and the dominant  distal right vertebral artery is widely patent to the vertebrobasilar  junction. The basilar artery and basilar tip is normal in appearance.  There is a markedly hypoplastic P1 segment left posterior cerebral  artery with an area of  persistent fetal origin left posterior cerebral  artery off the back wall of the supracavernous left internal carotid  artery which is a normal anatomic variation. The posterior cerebral and  superior cerebellar arteries are normal in appearance. There are  calcified plaques mildly narrowing the cavernous segments of the  internal carotid arteries. Superior cavernous segments are widely patent  without stenosis to the carotid termini. The anterior cerebral arteries  and anterior communicating artery origin are normal in appearance. The  M1 segment of the right middle cerebral artery and right middle cerebral  artery trifurcation is normal in appearance. Extending from the very  distal M1 segment of the left middle cerebral artery into the origin and  proximal 5 mm of the anterior-superior division M2 segment is an  intraluminal filling defect felt to be thrombus, and there is some  extension into the origin proximal aspect of the posterior-inferior  division M2 segment by several millimeters, as well and there is good  filling of the mid and distal posterior-inferior division M2 segment and  then it bifurcates into the M3 branches and there is occlusion of the  left temporoparietal M3 branch of the posterior-inferior division,  likely a chronic occlusion that occurred at the time of the old infarct:     IMPRESSION:  1. There are heavily calcified plaques involving the aortic arch and  extending into the origins of the great vessels off the  aortic arch.  Heavily calcified plaque severely narrows the left subclavian artery  origin, moderately narrows the proximal left subclavian artery. There is  a heavily calcified plaque that severely narrows the left vertebral  artery origin and the proximal and mid left vertebral arteries severely  diseased multifocally stenotic vessel with a tiny diameter enhancing  lumen that fades as it goes superiorly in the cervical spine and there  is nonenhancement in the left vertebral lumen from C1-C2 cervical level  to its proximal intracranial segment where there is enhancement in the  intracranial segment of the left vertebral artery to the PICA origin,  post PICA segment is normal to the vertebrobasilar junction filling the  intracranial segments likely from retrograde flow from the basilar  artery.     2. Heavily calcified plaque moderate-to-severely narrows the right  common carotid bifurcation by 70%, extends into the origin and severely  narrows the origin of the right internal carotid artery by greater than  70% using the NASCET criteria and moderate-to-severely narrows the right  external carotid origin.     3. Extensive pansinusitis with subtotal opacification of the frontal and  ethmoid maxillary sinus and complete opacification of the sphenoid  sinuses bilaterally with fluid and mucosal thickening.     4. 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 The old infarct measures 3.6 x 3.5 x 4  cm.     5. On the noncontrast head CT images there are subtle parenchymal edema  most consistent with early acute infarction involving the mid to  inferior lateral left frontal lobe, involves portion of  the anterior to  mid left corona radiata region, majority of the left external capsule  and left insular cortex, measures up to 6 x 3     in anterior posterior,  medial lateral and craniocaudal dimension. On the CT perfusion study of  the head there is a 49 cc volume of brain parenchyma that is ischemic  and in the center of it is a 26 cc volume of acute completed infarction  involving the mid and inferior lateral left frontal lobe extending to  the left external capsule and insular cortex, suggesting that there is  some brain that is hypoperfused but not yet infarcted in the  anterior-superior division left middle cerebral artery territory.  Furthermore on the CT angiogram images of the head there is early M2  branch off the inferior aspect of the mid to distal M1 segment of the  left middle cerebral artery that is widely patent and there is an acute  embolus that involves a very distal aspect of the M1 segment of the left  middle cerebral artery extending the proximal 4-5 mm of the superior and  inferior division M2 segments of the left middle cerebral artery.  Furthermore, the mid and distal inferior-posterior division left middle  cerebral artery fills well with contrast but the temporoparietal M3  branch in the inferior division left middle cerebral artery territory  does not fill with contrast and is felt to be occluded and likely  chronically occluded as this leads to the old area of infarction in the  left temporoparietal region.     Results of the final dictated exam were communicated to Dr. Solis and  Dr. Timmons by telephone on 07/06/2018 at 1:50 PM. Radiation dose  reduction techniques were utilized, including automated exposure control  and exposure modulation based on body size.          CT Cerebral Perfusion With & Without Contrast [[id number]] Collected:  07/06/18 1539     Updated:  07/06/18 1539    Narrative:       EMERGENCY CONTRAST-ENHANCED CT ANGIOGRAM OF HEAD AND NECK AND CT  PERFUSION STUDY OF THE  HEAD 07/06/2018      CLINICAL HISTORY: Acute onset confusion, right facial weakness, speech  abnormality, acute stroke.     TECHNIQUE: Initially spiral CT images were obtained from the base of the  skull to the vertex without intravenous contrast and images were  reformatted and submitted in 3 mm thick axial CT section, 2 mm thick  sagittal and coronal reconstructions were performed and images are  submitted in brain algorithm.     FINDINGS ON NONCONTRAST HEAD CT: There is a moderate sized area of  encephalomalacia extending from the posterior superior left temporal  lobe into the lateral left parietal lobe. The area measures up to 3.6 x  3.5 x 4.5 cm in maximal medial lateral anterior posterior and  craniocaudal dimension, distribution of temporoparietal branches of the  posterior-inferior division left middle cerebral artery territory.  Furthermore, just anterior to this is a more subtle area of parenchymal  low-density that involves the anterior mid left corona radiata region  and extends into the superior left putamen, involves the left external  capsule, the entire left insular cortex and extends into the mid and  inferior lateral left frontal lobe. This is compatible with an early  acute infarct in the anterior-superior division left middle cerebral  artery territory that measures up to 6.5 x 3.3 x 4 cm. There is no  hemorrhagic transformation, no intracranial hemorrhage seen. There is  mild low-density in the frontal periventricular white matter compatible  with mild small vessel disease. Ventricles are normal in size. There is  no midline shift, no extraaxial fluid collections are identified. Once  again, no acute intracranial hemorrhage is seen. There is very extensive  pansinus opacification with near complete opacification of frontal  ethmoid maxillary and sphenoid sinuses with fluid and mucosal thickening  bilaterally. Mastoid and middle ear cavities are clear.       Impression:          1. There is a  moderate sized old infarct extending throughout the  lateral left parietal lobe into the posterior superior lateral left  temporal lobe and posterior left insular cortex and this old infarct in  the posterior-inferior division left middle cerebral artery territory  maximally measures up to 3.6 x 3.5 x 4 cm medial lateral, anterior  posterior and craniocaudal dimension, and this infarct occurred back in  June 2008, 10 years ago.     2. There is subtle parenchymal edema most consistent with an area of  early acute infarction involving the mid and inferior lateral left  frontal lobe, the anterior mid left corona radiata region, superior  lateral putamen, entire left external capsule and left insular cortex,  measures 6.5 x 3.3 x 4 cm in anterior posterior, medial lateral and  craniocaudal dimension.  This acute infarct is in the anterior-superior  division left middle cerebral artery territory and there is no  hemorrhagic transformation.     3. Extensive pansinusitis as described.     4. The remainder of the head CT is unremarkable.     The results were communicated to Dr. Timmons while the patient was still  on the table 07/06/2018 at 12:55 PM and he requested a contrast-enhanced  CT angiogram of the head and neck and CT perfusion study of the head,  and subsequently spiral CT images were obtained through the head during  the arterial phase of contrast and images were reformatted and analyzed  with rapid software analysis for CT perfusion study of the head.  Subsequently, spiral CT images were obtained from the top of the aortic  arch up through the great vessels of the head and neck during the  arterial phase of contrast and images were reformatted and submitted in  1 mm thick axial CT section, 1 mm thick sagittal and coronal  reconstructions and 3D reconstructions were performed to complete the CT  perfusion study of the head. Finally, spiral CT images were obtained  from the base of the skull to vertex delayed  following intravenous  contrast and these images were reformatted and submitted in brain  algorithm, 3 mm thick axial CT sections.     CT PERFUSION STUDY: There is a moderate sized area of delayed time to  T-max of greater than 6 seconds extending throughout the lateral left  frontal lobe into the left insular cortex. Volume of brain involved on  exam measures 49 cc and there is a smaller area of reduced cerebral  blood flow to less than 30% measuring 26 cc that involves mid and  inferior lateral left frontal lobe, as well as the left insular cortex  and external capsule, compatible with area of acute completed  infarction. The infarct appears slightly bigger on the noncontrast head  CT, but based on CT perfusion study the mismatch volume is 23 mm for the  brain.     CT ANGIOGRAM HEAD AND NECK FINDINGS: Again demonstrated is a  pansinusitis. The nasopharynx, oropharynx, hypopharynx, true cords, and  subglottic airway are normal in appearance. Thyroid gland, right lobe is  normal in appearance, left lobe is smaller, may be partially surgically  absent or just atrophic. There are emphysematous changes in upper lung  zone lung apices bilaterally. There is an irregular cluster of small  patchy nodular opacity in the posterior inferior lateral left upper  lobe, anterior to the superior aspect of the major fissure that measures  2.8 x 1.4 cm, nonspecific, could be a focal inflammatory infiltrate. The  parotid, , parapharyngeal and submandibular spaces are  symmetric and are normal in appearance. There is lower cervical  spondylosis with disc space narrowing, degenerative endplate change,  posterior endplate spurring, and uncovertebral joint hypertrophy and  mild facet overgrowth contributing to mild canal and moderate bilateral  foraminal narrowing at C5-C6, mild canal and mild left and moderate  right bony foraminal narrowing at C6-C7.     CT angiogram images through the neck demonstrate extensive  calcified  plaques throughout the aortic arch, calcified plaque severely narrows  the left subclavian artery origin. There are heavily calcified plaques  moderately narrowing the proximal aspect of the left subclavian artery.  There is calcified plaque that severely narrows the left vertebral  artery origin and there is a diseased multifocally stenotic small  diameter enhancing lumen to the left vertebral artery that loses its  enhancement in arterial phase as it extends superiorly within the  cervical spine and C1 ring level, loses all enhancement, may be occluded  or just be slow flow that does not fill during arterial phase, and then  the mid and distal intracranial segment of the left vertebral artery is  patent to the vertebrobasilar junction, possibly from a retrograde flow  the basilar artery. The left common carotid artery origin is moderately  narrowed by 50% by calcified plaques in the aortic arch, mid and distal  left common carotid artery is within normal limits. There is a  circumferential noncalcified plaque that mildly narrows the left common  carotid bifurcation, extends into the origin of the left internal  carotid artery, but there is essentially no stenosis in left internal  carotid artery using the NASCET criteria. Brachycephalic artery origins  mildly narrowed by calcified plaque, but is otherwise patent to its  bifurcation. There is calcified plaque mildly narrowing the subclavian  artery origin. The right vertebral artery origin is only mildly  narrowed. The right vertebral artery is dominant vertebral artery and is  widely patent beyond its origin to the vertebrobasilar junction without  stenosis. The right common carotid origin is normal in appearance and no  stenosis seen in the right common carotid artery to its bifurcation  where there is a heavily calcified plaque that moderately narrows the  right common carotid bifurcation, extends into the origin of the  proximal aspect of the right  internal carotid artery and appears to up  to severely narrow the origin of the right internal carotid artery by  greater than 70% using the NASCET criteria. There is moderate-to-severe  stenosis of the right external carotid origin. The remainder of the  right internal carotid artery is within normal limits.     The CT angiogram images through the head demonstrate a diseased upper  cervical left vertebral artery and no enhancement in the lumen of the  left vertebral artery from the C2 cervical level to its mid intracranial  segment where there is enhancement in the mid and distal intracranial  segment of the left vertebral artery, likely from retrograde flow from  the basilar artery to just proximal to PICA origin and the dominant  distal right vertebral artery is widely patent to the vertebrobasilar  junction. The basilar artery and basilar tip is normal in appearance.  There is a markedly hypoplastic P1 segment left posterior cerebral  artery with an area of  persistent fetal origin left posterior cerebral  artery off the back wall of the supracavernous left internal carotid  artery which is a normal anatomic variation. The posterior cerebral and  superior cerebellar arteries are normal in appearance. There are  calcified plaques mildly narrowing the cavernous segments of the  internal carotid arteries. Superior cavernous segments are widely patent  without stenosis to the carotid termini. The anterior cerebral arteries  and anterior communicating artery origin are normal in appearance. The  M1 segment of the right middle cerebral artery and right middle cerebral  artery trifurcation is normal in appearance. Extending from the very  distal M1 segment of the left middle cerebral artery into the origin and  proximal 5 mm of the anterior-superior division M2 segment is an  intraluminal filling defect felt to be thrombus, and there is some  extension into the origin proximal aspect of the posterior-inferior  division  M2 segment by several millimeters, as well and there is good  filling of the mid and distal posterior-inferior division M2 segment and  then it bifurcates into the M3 branches and there is occlusion of the  left temporoparietal M3 branch of the posterior-inferior division,  likely a chronic occlusion that occurred at the time of the old infarct:     IMPRESSION:  1. There are heavily calcified plaques involving the aortic arch and  extending into the origins of the great vessels off the aortic arch.  Heavily calcified plaque severely narrows the left subclavian artery  origin, moderately narrows the proximal left subclavian artery. There is  a heavily calcified plaque that severely narrows the left vertebral  artery origin and the proximal and mid left vertebral arteries severely  diseased multifocally stenotic vessel with a tiny diameter enhancing  lumen that fades as it goes superiorly in the cervical spine and there  is nonenhancement in the left vertebral lumen from C1-C2 cervical level  to its proximal intracranial segment where there is enhancement in the  intracranial segment of the left vertebral artery to the PICA origin,  post PICA segment is normal to the vertebrobasilar junction filling the  intracranial segments likely from retrograde flow from the basilar  artery.     2. Heavily calcified plaque moderate-to-severely narrows the right  common carotid bifurcation by 70%, extends into the origin and severely  narrows the origin of the right internal carotid artery by greater than  70% using the NASCET criteria and moderate-to-severely narrows the right  external carotid origin.     3. Extensive pansinusitis with subtotal opacification of the frontal and  ethmoid maxillary sinus and complete opacification of the sphenoid  sinuses bilaterally with fluid and mucosal thickening.     4. There is a moderate sized old infarct in the posterior-inferior  division left middle cerebral artery territory that occurred  back in  June 2008, extends throughout the mid and inferior left parietal lobe  into the posterior superior left temporal lobe and distribution  temporoparietal branch of the left posterior-inferior division left  middle cerebral artery territory. The old infarct measures 3.6 x 3.5 x 4  cm.     5. On the noncontrast head CT images there are subtle parenchymal edema  most consistent with early acute infarction involving the mid to  inferior lateral left frontal lobe, involves portion of the anterior to  mid left corona radiata region, majority of the left external capsule  and left insular cortex, measures up to 6 x 3     in anterior posterior,  medial lateral and craniocaudal dimension. On the CT perfusion study of  the head there is a 49 cc volume of brain parenchyma that is ischemic  and in the center of it is a 26 cc volume of acute completed infarction  involving the mid and inferior lateral left frontal lobe extending to  the left external capsule and insular cortex, suggesting that there is  some brain that is hypoperfused but not yet infarcted in the  anterior-superior division left middle cerebral artery territory.  Furthermore on the CT angiogram images of the head there is early M2  branch off the inferior aspect of the mid to distal M1 segment of the  left middle cerebral artery that is widely patent and there is an acute  embolus that involves a very distal aspect of the M1 segment of the left  middle cerebral artery extending the proximal 4-5 mm of the superior and  inferior division M2 segments of the left middle cerebral artery.  Furthermore, the mid and distal inferior-posterior division left middle  cerebral artery fills well with contrast but the temporoparietal M3  branch in the inferior division left middle cerebral artery territory  does not fill with contrast and is felt to be occluded and likely  chronically occluded as this leads to the old area of infarction in the  left temporoparietal  region.     Results of the final dictated exam were communicated to Dr. Solis and  Dr. Timmons by telephone on 07/06/2018 at 1:50 PM. Radiation dose  reduction techniques were utilized, including automated exposure control  and exposure modulation based on body size.                For this problem, the following was ordered:  Orders Placed This Encounter   Procedures   • Critical Care   • CT Angiogram Head With & Without Contrast   • CT Angiogram Neck With & Without Contrast   • CT Cerebral Perfusion With & Without Contrast   • MRI Brain Without Contrast   • Comprehensive Metabolic Panel   • Protime-INR   • CBC Auto Differential   • P2Y12 Platelet Inhibition   • Platelet Response Aspirin   • Hemoglobin A1c   • Lipid Panel   • NPO Diet   • Monitor Blood Pressure   • Vital Signs Per Hospital Policy   • Vital Signs   • Pulse Oximetry, Continuous   • Cardiac Monitoring   • Elevate Head of Bed   • Turn Patient   • Intake and Output   • Neuro Checks   • NIHSS Assessment   • Order CT Head Without Contrast for Neurological Decline   • Place Compression Stockings (TEDs)   • Remove & Replace Compression Stockings (TEDS) Daily   • Provide Stroke Education Material   • Nursing Dysphagia Screening (Complete Prior to Giving Anything By Mouth)   • RN to Place Order SLP Consult - Eval & Treat Choosing Reason of RN Dysphagia Screen Failed   • Nurse to Call MD or Nutrition Services for Diet if Patient Passes Dysphagia Screen   • Notify Provider   • Saline Lock & Maintain IV Access   • Place Sequential Compression Device   • Maintain Sequential Compression Device   • Code Status and Medical Interventions:   • [facility] (on-call MD unless specified)   • Inpatient Neuro Clinical Specialist Consult   • Inpatient Rehab Admission Consult   • Inpatient Case Management  Consult   • Inpatient Diabetes Educator Consult   • OT Consult: Eval & Treat Stroke Patient   • PT Consult: Eval & Treat   • SLP Consult: Eval & Treat   • SLP  Evaluation - Failed Bedside Dysphagia Screening   • SLP Consult: Eval & Treat   • POC Glucose Once   • POC Glucose Q6H   • ECG 12 Lead   • Adult Transthoracic Echo Complete W/ Cont if Necessary Per Protocol (With Agitated Saline)   • Insert peripheral IV   • Insert Peripheral IV   • Inpatient Admission   • CBC & Differential       Problem List Items Addressed This Visit     Cerebrovascular accident (CVA) due to occlusion of left middle cerebral artery (CMS/HCC) - Primary          ASSESSMENT/PLAN: This is a 88 y.o. female who has   Past Medical History:   Diagnosis Date   • Ankle pain, right    • Anxiety    • CHF (congestive heart failure) (CMS/HCC)     with preserved EF   • Chronic renal disease, stage 3, moderately decreased glomerular filtration rate between 30-59 mL/min/1.73 square meter    • COPD (chronic obstructive pulmonary disease) (CMS/HCC)    • Coronary artery disease    • Depression    • Hyperlipidemia    • Hypertension    • Hypothyroidism    • Stroke (CMS/HCC)     presents with Aphasia.  CT head demonstrated old CVA in the left parietal area and also some early edema changes in the left MCA territory.  CT perfusion shows perfusion mismatch in the left MCA territory CTA demonstrated left MCA M2 occlusion.  Patient is not in the TPA candidate because of time mental.  Patient is also evaluated by neurosurgery and deemed not to be an ideal thrombectomy candidate given her age, comorbidities, time window, location of the thrombus, amount of area at risk from perfusion studies.  Family agrees with the same.  Extensive discussion was made with all the 3 daughters and THEY all agree that patient is high risk and or to pursue medical management.    1.  Left MCA ischemic stroke from left M2 occlusion:    - Stroke labs; B12, TSH, lipid and A1c.  - MRI brain  - Telemetry   - PTOT speech rehabilitation assessment  - DVT prophylaxis  - Swallow study  - Statins  - Echocardiogram.  - Antiplatelet therapy and statins.  -  Cardiac key (Holter) to monitor heart for arrhythmias at discharge    As per family/POA patient is DNR/DNI.    Extremely guarded prognosis.      Thank you for allowing us to participate in the care of this patient.    Keshia Timmons MD  07/06/18  4:40 PM

## 2018-07-06 NOTE — TELEPHONE ENCOUNTER
PT'S DAUGHTER CALLED TO LET  KNOW THAT SHE IS CURRENTLY IN THE EMERGENCY ROOM DUE TO HAVING A STROKE LATE LAST NIGHT. SHE STATED THAT [facility] HAD RECENTLY SENT PT TO THE ER DUE TO AN ABNORMAL EKG, AND FELT THAT  SHOULD KNOW ABOUT THIS

## 2018-07-07 NOTE — THERAPY EVALUATION
Acute Care - Physical Therapy Initial Evaluation  [facility]     Patient Name: Meggan Huynh  : 1929  MRN: [id number]  Today's Date: 2018   Onset of Illness/Injury or Date of Surgery: 18  Date of Referral to PT: 18  Referring Physician: Dr. Keshia Timmons MD       Admit Date: 2018    Visit Dx:     ICD-10-CM ICD-9-CM   1. Cerebrovascular accident (CVA) due to occlusion of left middle cerebral artery (CMS/HCC) I63.512 434.91   2. Chronic systolic congestive heart failure (CMS/HCC) I50.22 428.22     428.0   3. Chronic bronchitis, unspecified chronic bronchitis type (CMS/HCC) J42 491.9   4. Oropharyngeal dysphagia R13.12 787.22   5. Generalized weakness R53.1 780.79     Patient Active Problem List   Diagnosis   • Cerebrovascular accident (CVA) due to occlusion of left middle cerebral artery (CMS/HCC)   • Hypothyroidism   • Hypertension   • Hyperlipidemia   • Chronic renal disease, stage 3, moderately decreased glomerular filtration rate between 30-59 mL/min/1.73 square meter   • H/O CHF     Past Medical History:   Diagnosis Date   • Ankle pain, right    • Anxiety    • CHF (congestive heart failure) (CMS/HCC)     with preserved EF   • Chronic renal disease, stage 3, moderately decreased glomerular filtration rate between 30-59 mL/min/1.73 square meter    • COPD (chronic obstructive pulmonary disease) (CMS/HCC)    • Coronary artery disease    • Depression    • Hyperlipidemia    • Hypertension    • Hypothyroidism    • Stroke (CMS/HCC)      History reviewed. No pertinent surgical history.     PT ASSESSMENT (last 12 hours)      Physical Therapy Evaluation     Row Name 18 1017          PT Evaluation Time/Intention    Subjective Information no complaints  -KH     Document Type evaluation  -KH     Mode of Treatment individual therapy;physical therapy  -KH     Patient Effort good  -KH     Symptoms Noted During/After Treatment none  -KH     Row Name 18 1017          General  Information    Patient Profile Reviewed? yes  -KH     Onset of Illness/Injury or Date of Surgery 07/06/18  -     Referring Physician Dr. Keshia Timmons MD   -     Patient Observations alert;cooperative;agree to therapy  -     Patient/Family Observations family by patient's bedside  -     General Observations of Patient laying supine  -KH     Prior Level of Function independent:  -KH     Equipment Currently Used at Home walker, rolling  -KH     Pertinent History of Current Functional Problem CVA R sided weakness  -     Existing Precautions/Restrictions fall  -     Risks Reviewed patient and family:  -     Benefits Reviewed patient and family:  -     Barriers to Rehab none identified  -     Row Name 07/07/18 1017          Relationship/Environment    Primary Source of Support/Comfort child(justin)  -     Row Name 07/07/18 1017          Resource/Environmental Concerns    Current Living Arrangements home/apartment/condo  -     Row Name 07/07/18 1017          Cognitive Assessment/Interventions    Additional Documentation Cognitive Assessment/Intervention (Group)  -     Row Name 07/07/18 1017          Cognitive Assessment/Intervention- PT/OT    Orientation Status (Cognition) oriented to;person;place  -     Follows Commands (Cognition) follows one step commands;follows two step commands;75-90% accuracy  -     Safety Deficit (Cognitive) moderate deficit  -     Personal Safety Interventions fall prevention program maintained;gait belt;nonskid shoes/slippers when out of bed;supervised activity  -     Row Name 07/07/18 1017          Bed Mobility Assessment/Treatment    Bed Mobility Assessment/Treatment supine-sit;sit-supine  -     Supine-Sit [place] (Bed Mobility) contact guard  -     Sit-Supine [place] (Bed Mobility) contact guard  -     Bed Mobility, Safety Issues cognitive deficits limit understanding  -     Assistive Device (Bed Mobility) bed rails  -     Row Name  07/07/18 1017          Transfer Assessment/Treatment    Transfer Assessment/Treatment sit-stand transfer;stand-sit transfer  -     Sit-Stand [place] (Transfers) minimum assist (75% patient effort)  -     Stand-Sit [place] (Transfers) minimum assist (75% patient effort)  -     Row Name 07/07/18 1017          Sit-Stand Transfer    Assistive Device (Sit-Stand Transfers) walker, front-wheeled  -PAULINE     Row Name 07/07/18 1017          Stand-Sit Transfer    Assistive Device (Stand-Sit Transfers) walker, front-wheeled  -     Row Name 07/07/18 1017          Gait/Stairs Assessment/Training    Gait/Stairs Assessment/Training gait/ambulation independence  -     [place] Level (Gait) minimum assist (75% patient effort)  -     Assistive Device (Gait) walker, front-wheeled  -     Distance in Feet (Gait) 60  -     Deviations/Abnormal Patterns (Gait) bilateral deviations;base of support, wide;yoav decreased;stride length decreased  -     Bilateral Gait Deviations weight shift ability decreased  -     Row Name 07/07/18 1017          General Assessment (Manual Muscle Testing)    Comment, General Manual Muscle Testing (MMT) Assessment B LE grossly 4- to 4/5  -     Row Name 07/07/18 1017          Pain Assessment    Additional Documentation Pain Scale: Numbers Pre/Post-Treatment (Group)  -[facility] Name 07/07/18 1017          Pain Scale: Numbers Pre/Post-Treatment    Pain Scale: Numbers, Pretreatment 0/10 - no pain  -     Pain Scale: Numbers, Post-Treatment 0/10 - no pain  -     Row Name 07/07/18 1017          Physical Therapy Clinical Impression    Date of Referral to PT 07/06/18  -     PT Diagnosis (PT Clinical Impression) Generalized weakness  -     Prognosis (PT Clinical Impression) good  -     Functional Level at Time of Evaluation (PT Clinical Impression) min A  -KH     Patient/Family Goals Statement (PT Clinical Impression) to go home  -     Criteria for Skilled Interventions Met  (PT Clinical Impression) yes;treatment indicated  -     Pathology/Pathophysiology Noted (Describe Specifically for Each System) musculoskeletal;pulmonary;cardiovascular  -     Impairments Found (describe specific impairments) aerobic capacity/endurance;gait, locomotion, and balance  -     Functional Limitations in Following Categories (Describe Specific Limitations) self-care;home management;community/leisure  -KH     Disability: Inability to Perform Actions/Activities of Required Roles (describe specific disability) community/leisure  -KH     Rehab Potential (PT Clinical Summary) good, to achieve stated therapy goals  -     Row Name 07/07/18 1017          Vital Signs    Pre SpO2 (%) 95  -KH     O2 Delivery Pre Treatment supplemental O2  -KH     O2 Delivery Intra Treatment room air  -KH     Post SpO2 (%) 94  -KH     O2 Delivery Post Treatment supplemental O2  -KH     Pre Patient Position Supine  -KH     Intra Patient Position Standing  -KH     Post Patient Position Supine  -     Row Name 07/07/18 1017          Physical Therapy Goals    Bed Mobility Goal Selection (PT) bed mobility, PT goal 1  -KH     Transfer Goal Selection (PT) transfer, PT goal 1  -     Gait Training Goal Selection (PT) gait training, PT goal 1  -     Row Name 07/07/18 1017          Bed Mobility Goal 1 (PT)    Activity/Assistive Device (Bed Mobility Goal 1, PT) sit to supine/supine to sit  -KH     [place] Level/Cues Needed (Bed Mobility Goal 1, PT) conditional independence  -KH     Time Frame (Bed Mobility Goal 1, PT) 1 week  -KH     Progress/Outcomes (Bed Mobility Goal 1, PT) goal ongoing  -     Row Name 07/07/18 1017          Transfer Goal 1 (PT)    Activity/Assistive Device (Transfer Goal 1, PT) sit-to-stand/stand-to-sit  -KH     [place] Level/Cues Needed (Transfer Goal 1, PT) conditional independence  -KH     Time Frame (Transfer Goal 1, PT) 1 week  -KH     Progress/Outcome (Transfer Goal 1, PT) goal ongoing  -      Row Name 07/07/18 1017          Gait Training Goal 1 (PT)    Activity/Assistive Device (Gait Training Goal 1, PT) gait (walking locomotion)  -     [place] (Gait Training Goal 1, PT) supervision required  -KH     Distance (Gait Goal 1, PT) 150  -KH     Time Frame (Gait Training Goal 1, PT) 1 week  -KH     Progress/Outcome (Gait Training Goal 1, PT) goal ongoing  -     Row Name 07/07/18 1017          Positioning and Restraints    Pre-Treatment Position in bed  -     Post Treatment Position bed  -KH     In Bed supine;call light within reach;encouraged to call for assist  -       User Key  (r) = Recorded By, (t) = Taken By, (c) = Cosigned By    Initials Name Provider Type    PAULINE Curry PT Physical Therapist          Physical Therapy Education     Title: PT OT SLP Therapies (Active)     Topic: Physical Therapy (Active)     Point: Mobility training (Active)    Learning Progress Summary     Learner Status Readiness Method Response Comment Documented by    Patient Active Acceptance E,TB NR   07/07/18 1022          Point: Body mechanics (Active)    Learning Progress Summary     Learner Status Readiness Method Response Comment Documented by    Patient Active Acceptance E,TB [facility] 07/07/18 1022          Point: Precautions (Active)    Learning Progress Summary     Learner Status Readiness Method Response Comment Documented by    Patient Active Acceptance E,[facility] 07/07/18 1022                      User Key     Initials Effective Dates Name Provider Type [facility] 06/22/16 -  Ketty Curry PT Physical Therapist PT                PT Recommendation and Plan  Anticipated Discharge Disposition (PT): home with home health  Planned Therapy Interventions (PT Eval): balance training, bed mobility training, gait training, home exercise program, patient/family education, postural re-education, strengthening, stretching, transfer training, ROM (range of motion)  Therapy Frequency (PT Clinical  Impression): daily  Outcome Summary/Treatment Plan (PT)  Anticipated Discharge Disposition (PT): home with home health  Plan of Care Reviewed With: patient  Progress: improving  Outcome Summary: Patient admitted s/p CVA but currently requiring just a little help (CGA/min A) with basic mobility. Would benefit from skilled PT in acute care followed by D/C home with home health PT or famiyl support          Outcome Measures     Row Name 07/07/18 1000             How much help from another person do you currently need...    Turning from your back to your side while in flat bed without using bedrails? 4  -KH      Moving from lying on back to sitting on the side of a flat bed without bedrails? 3  -KH      Moving to and from a bed to a chair (including a wheelchair)? 3  -KH      Standing up from a chair using your arms (e.g., wheelchair, bedside chair)? 3  -KH      Climbing 3-5 steps with a railing? 3  -KH      To walk in hospital room? 3  -KH      AM-PAC 6 Clicks Score 19  -KH         Functional Assessment    Outcome Measure Options AM-PAC 6 Clicks Basic Mobility (PT)  -        User Key  (r) = Recorded By, (t) = Taken By, (c) = Cosigned By    Initials Name Provider Type    PAULINE Curry PT Physical Therapist           Time Calculation:         PT Charges     Row Name 07/07/18 1016             Time Calculation    Start Time 0942  -      Stop Time 1006  -      Time Calculation (min) 24 min  -      PT Received On 07/07/18  -PAULINE      PT - Next Appointment 07/08/18  -PAULINE      PT Goal Re-Cert Due Date 07/14/18  -        User Key  (r) = Recorded By, (t) = Taken By, (c) = Cosigned By    Initials Name Provider Type    PAULINE Curry PT Physical Therapist        Therapy Suggested Charges     Code   Minutes Charges    None           Therapy Charges for Today     Code Description Service Date Service Provider Modifiers Qty    [id number] HC PT EVAL LOW COMPLEXITY 2 7/7/2018 Ketty Curry PT GP 1          PT G-Codes  Outcome  Measure Options: AM-PAC 6 Clicks Basic Mobility (PT)      Ketty Curry, PT  7/7/2018

## 2018-07-07 NOTE — PLAN OF CARE
Problem: Fall Risk (Adult)  Goal: Identify Related Risk Factors and Signs and Symptoms  Outcome: Ongoing (interventions implemented as appropriate)    Goal: Absence of Fall  Outcome: Ongoing (interventions implemented as appropriate)      Problem: Patient Care Overview  Goal: Plan of Care Review  Outcome: Ongoing (interventions implemented as appropriate)   07/06/18 2201   OTHER   Outcome Summary Admit from ER with CVA. Aphasic. Able to follow some commands, but not all. Awaiting MRI results. Family at bedside.       Problem: Skin Injury Risk (Adult)  Goal: Identify Related Risk Factors and Signs and Symptoms  Outcome: Ongoing (interventions implemented as appropriate)    Goal: Skin Health and Integrity  Outcome: Ongoing (interventions implemented as appropriate)

## 2018-07-07 NOTE — PROGRESS NOTES
Name: Meggan Huynh ADMIT: 2018   : 1929  PCP: Devan Snow MD    MRN: [id number] LOS: 1 days   AGE/SEX: 88 y.o. female  ROOM: [facility]   Subjective   Patient speaks in words and sounds only but she is alert and following commands. Discussed with daughters who report that she has had irregular heart rate ranging from normal to 130s which is new. They deny previous AFib but records show SVT necessitating ER visit where she had tachycardia and old LBBB. She was taking plavix prior to admission. EF was 55-60% in 2016. Nods yes/no to questions. She has dyspnea with exertion which is confirmed by family. No cough NVD.    Objective   Vital Signs  Temp:  [97.5 °F (36.4 °C)-98.3 °F (36.8 °C)] 97.8 °F (36.6 °C)  Heart Rate:  [] 66  Resp:  [16-20] 20  BP: (107-200)/(59-84) 162/64  SpO2:  [93 %-99 %] 97 %  on  Flow (L/min):  [2] 2;   Device (Oxygen Therapy): nasal cannula  Body mass index is 31.77 kg/m².    Physical Exam   Constitutional: She appears well-developed. No distress.   HENT:   Head: Normocephalic and atraumatic.   Eyes: Conjunctivae are normal. Pupils are equal, round, and reactive to light.   Neck: Neck supple.   Cardiovascular: Normal rate, regular rhythm and intact distal pulses.    Pulmonary/Chest: Effort normal and breath sounds normal. No stridor. She has no wheezes.   Abdominal: Soft. She exhibits no distension. There is no tenderness.   Musculoskeletal: She exhibits no edema or tenderness.   Neurological: She is alert.   Strength equal bilaterally. Expressive aphasic speech. Right facial droop.   Skin: She is not diaphoretic.   Nursing note and vitals reviewed.      Results Review:       I reviewed the patient's new clinical results.     I reviewed imaging, agree with interpretation.     I reviewed telemetry/EKG results, sinus rhythm, lbbb. Telemetry had periods of irregular rhythm with low voltage.      Results from last 7 days  Lab Units 18  0618 18  1255  07/03/18  1725   WBC 10*3/mm3 9.96 8.78 9.12   HEMOGLOBIN g/dL 11.9 12.4 13.0   PLATELETS 10*3/mm3 169 185 200     Results from last 7 days  Lab Units 07/07/18 0618 07/06/18  1255 07/03/18  1725   SODIUM mmol/L 141 141 143   POTASSIUM mmol/L 3.5 3.8 4.4   CHLORIDE mmol/L 103 99 104   CO2 mmol/L 24.3 30.7* 24.2   BUN mg/dL 20 26* 30*   CREATININE mg/dL 1.02* 1.23* 1.43*   GLUCOSE mg/dL 109* 113* 87   Estimated Creatinine Clearance: 37.1 mL/min (A) (by C-G formula based on SCr of 1.02 mg/dL (H)).  Results from last 7 days  Lab Units 07/07/18 0618 07/06/18  1255 07/03/18  1725   CALCIUM mg/dL 7.6* 8.1* 8.5*   ALBUMIN g/dL  --  4.20 4.00   MAGNESIUM mg/dL  --   --  2.1         aspirin 325 mg Oral Daily   Or      aspirin 300 mg Rectal Daily   atorvastatin 80 mg Oral Nightly   azelastine 1 spray Each Nare Daily   budesonide-formoterol 2 puff Inhalation BID - RT   calcium carbonate 2 tablet Oral Daily   cetirizine 5 mg Oral Daily   cholecalciferol 2,000 Units Oral QAM   clopidogrel 75 mg Oral Daily   escitalopram 10 mg Oral Nightly   fluticasone 1 spray Each Nare Daily   ipratropium 1 spray Each Nare Daily   ipratropium-albuterol 3 mL Nebulization 4x Daily - RT   levothyroxine 50 mcg Oral Daily   O2 2 L/min Inhalation Nightly       Pharmacy Consult    NPO Diet      Assessment/Plan      Active Hospital Problems    Diagnosis Date Noted   • Cerebrovascular accident (CVA) due to occlusion of left middle cerebral artery (CMS/HCC) [I63.512] 07/06/2018   • Hypothyroidism [E03.9] 07/06/2018   • Hypertension [I10] 07/06/2018   • Hyperlipidemia [E78.5] 07/06/2018   • Chronic renal disease, stage 3, moderately decreased glomerular filtration rate between 30-59 mL/min/1.73 square meter [N18.3] 07/06/2018   • H/O CHF [Z86.79] 07/06/2018      Resolved Hospital Problems    Diagnosis Date Noted Date Resolved   No resolved problems to display.       - CVA: Large left MCA stroke. Continue ASA/Plavix/Statin. Reports of previous SVT are  concerning. TTE pending. Continue to monitor on telemetry. Will consult Cardiology. PT/OT/SLP. Gentle fluids while NPO. Neurology consulted.  - Chronic Diastolic HF: Preserved EF in 2016 from [facility] records. Lasix held while NPO. Continue Coreg. Will check BNP with reports of exertional dyspnea.  - CKD3: Improving off lasix. Continue to monitor.  - Disposition: TBD    >35 minutes time spent    Meir Arriaga MD  [facility]  07/07/18  8:58 AM

## 2018-07-07 NOTE — PROGRESS NOTES
"DOS: 2018  NAME: Meggan Huynh   : 1929  PCP: Devan Snow MD  Chief Complaint   Patient presents with   • Altered Mental Status     since last night at    • Unable to speak     Stroke    Chief Complaint   Patient presents with   • Altered Mental Status     since last night at    • Unable to speak       Subjective: No significant change overnight per daughters.     Objective:  Vital signs: /64 (BP Location: Left arm, Patient Position: Lying)   Pulse 66   Temp 97.8 °F (36.6 °C) (Oral)   Resp 20   Ht 157.5 cm (62\")   Wt 78.8 kg (173 lb 11.2 oz)   SpO2 97%   BMI 31.77 kg/m²    General appearance: Well developed, well nourished, well groomed, alert and cooperative.   HEENT: Normocephalic.   Neck and spine: Normal range of motion. Normal alignment. No mass or tenderness.    Cardiac: Regular rate and rhythm. No murmurs.   Peripheral Vasculature: Extremities warm and dry.  Chest Exam: Clear to auscultation bilaterally, no wheezes, no rhonchi.  Extremities: No edema. LUE and left hand ecchymosis.  Skin: No rashes or birthmarks.     Higher integrative function: Alert. Global aphasia. Makes a few vocalizations but unable to name, repeat, since or count. Did not follow any commands.   CNs: BTT intact bilaterally. Extraocular movements are full without nystagmus. Pupils are equal, round, and reactive to light. No relative afferent pupillary defect. No internuclear ophthalmoplegia. Normal facial sensation and strength of muscles of mastication. Decrease in R NL fold. Symmetric palatal movement.   The tongue is midline. No atrophy or fasciculations.   Motor: RUE pronator drift, otherwise moving all extremities symmetrically, at least 4/5. No fasciculations, rigidity, spasticity or abnormal movements.   Sensation: Normal light touch.  Station and gait: Normal casual gait observed when transferred from stretcher to bed.  Muscle stretch reflexes: Reflexes are normal and symmetric in the upper " and lower extremities. Plantar reflexes are flexor bilaterally.   Coordination: Not following commands.    Meds reviewed    aspirin 325 mg Oral Daily   Or      aspirin 300 mg Rectal Daily   atorvastatin 80 mg Oral Nightly   azelastine 1 spray Each Nare Daily   budesonide-formoterol 2 puff Inhalation BID - RT   calcium carbonate 2 tablet Oral Daily   cetirizine 5 mg Oral Daily   cholecalciferol 2,000 Units Oral QAM   clopidogrel 75 mg Oral Daily   escitalopram 10 mg Oral Nightly   fluticasone 1 spray Each Nare Daily   ipratropium 1 spray Each Nare Daily   ipratropium-albuterol 3 mL Nebulization 4x Daily - RT   levothyroxine 50 mcg Oral Daily   O2 2 L/min Inhalation Nightly     No current facility-administered medications on file prior to encounter.      Current Outpatient Prescriptions on File Prior to Encounter   Medication Sig   • azelastine (ASTELIN) 0.1 % nasal spray 1 spray into each nostril Daily. Mixed with Flunisolide and Ipratropium   • cetirizine (ZyrTEC) 10 MG tablet Take 10 mg by mouth daily.   • Cholecalciferol (VITAMIN D) 2000 UNITS tablet Take 2,000 Units by mouth Every Morning.   • escitalopram (LEXAPRO) 10 MG tablet Take 10 mg by mouth Every Night.   • Umeclidinium Bromide (INCRUSE ELLIPTA) 62.5 MCG/INH aerosol powder  Inhale 1 puff Daily.   • Fluticasone Furoate-Vilanterol (BREO ELLIPTA) 100-25 MCG/INH aerosol powder  One inhalation daily         Laboratory results:  Lab Results   Component Value Date    GLUCOSE 109 (H) 07/07/2018    CALCIUM 7.6 (L) 07/07/2018     07/07/2018    K 3.5 07/07/2018    CO2 24.3 07/07/2018     07/07/2018    BUN 20 07/07/2018    CREATININE 1.02 (H) 07/07/2018    EGFRIFAFRI  09/23/2016      Comment:      <15 Indicative of kidney failure.    EGFRIFNONA 51 (L) 07/07/2018    BCR 19.6 07/07/2018    ANIONGAP 13.7 07/07/2018     Lab Results   Component Value Date    WBC 9.96 07/07/2018    HGB 11.9 07/07/2018    HCT 37.8 07/07/2018    MCV 89.6 07/07/2018      07/07/2018     Lab Results   Component Value Date    CHOL 107 07/07/2018    CHOL 129 10/19/2017    CHOL 151 04/11/2017     Lab Results   Component Value Date    HDL 49 07/07/2018    HDL 52 10/19/2017    HDL 57 04/11/2017     Lab Results   Component Value Date    LDL 37 07/07/2018    LDL 48 10/19/2017    LDL 58 04/11/2017     Lab Results   Component Value Date    TRIG 106 07/07/2018    TRIG 145 10/19/2017    TRIG 182 (H) 04/11/2017     Lab Results   Component Value Date    HGBA1C 5.90 (H) 07/07/2018     Lab Results   Component Value Date    TGEVVGJE00 485 07/11/2017     Lab Results   Component Value Date    TSH 2.980 07/03/2018     Aspirin platelet response 392  Plavix platelet response 195    Review of imaging: I viewed MRI brain and agree with interpretation.  Ct Angiogram Head With & Without Contrast    Result Date: 7/6/2018   1. There is a moderate sized old infarct extending throughout the lateral left parietal lobe into the posterior superior lateral left temporal lobe and posterior left insular cortex and this old infarct in the posterior-inferior division left middle cerebral artery territory maximally measures up to 3.6 x 3.5 x 4 cm medial lateral, anterior posterior and craniocaudal dimension, and this infarct occurred back in June 2008, 10 years ago.  2. There is subtle parenchymal edema most consistent with an area of early acute infarction involving the mid and inferior lateral left frontal lobe, the anterior mid left corona radiata region, superior lateral putamen, entire left external capsule and left insular cortex, measures 6.5 x 3.3 x 4 cm in anterior posterior, medial lateral and craniocaudal dimension.  This acute infarct is in the anterior-superior division left middle cerebral artery territory and there is no hemorrhagic transformation.  3. Extensive pansinusitis as described.  4. The remainder of the head CT is unremarkable.  The results were communicated to Dr. Timmons while the patient was  still on the table 07/06/2018 at 12:55 PM and he requested a contrast-enhanced CT angiogram of the head and neck and CT perfusion study of the head, and subsequently spiral CT images were obtained through the head during the arterial phase of contrast and images were reformatted and analyzed with rapid software analysis for CT perfusion study of the head. Subsequently, spiral CT images were obtained from the top of the aortic arch up through the great vessels of the head and neck during the arterial phase of contrast and images were reformatted and submitted in 1 mm thick axial CT section, 1 mm thick sagittal and coronal reconstructions and 3D reconstructions were performed to complete the CT perfusion study of the head. Finally, spiral CT images were obtained from the base of the skull to vertex delayed following intravenous contrast and these images were reformatted and submitted in brain algorithm, 3 mm thick axial CT sections.  CT PERFUSION STUDY: There is a moderate sized area of delayed time to T-max of greater than 6 seconds extending throughout the lateral left frontal lobe into the left insular cortex. Volume of brain involved on exam measures 49 cc and there is a smaller area of reduced cerebral blood flow to less than 30% measuring 26 cc that involves mid and inferior lateral left frontal lobe, as well as the left insular cortex and external capsule, compatible with area of acute completed infarction. The infarct appears slightly bigger on the noncontrast head CT, but based on CT perfusion study the mismatch volume is 23 mm for the brain.  CT ANGIOGRAM HEAD AND NECK FINDINGS: Again demonstrated is a pansinusitis. The nasopharynx, oropharynx, hypopharynx, true cords, and subglottic airway are normal in appearance. Thyroid gland, right lobe is normal in appearance, left lobe is smaller, may be partially surgically absent or just atrophic. There are emphysematous changes in upper lung zone lung apices  bilaterally. There is an irregular cluster of small patchy nodular opacity in the posterior inferior lateral left upper lobe, anterior to the superior aspect of the major fissure that measures 2.8 x 1.4 cm, nonspecific, could be a focal inflammatory infiltrate. The parotid, , parapharyngeal and submandibular spaces are symmetric and are normal in appearance. There is lower cervical spondylosis with disc space narrowing, degenerative endplate change, posterior endplate spurring, and uncovertebral joint hypertrophy and mild facet overgrowth contributing to mild canal and moderate bilateral foraminal narrowing at C5-C6, mild canal and mild left and moderate right bony foraminal narrowing at C6-C7.  CT angiogram images through the neck demonstrate extensive calcified plaques throughout the aortic arch, calcified plaque severely narrows the left subclavian artery origin. There are heavily calcified plaques moderately narrowing the proximal aspect of the left subclavian artery. There is calcified plaque that severely narrows the left vertebral artery origin and there is a diseased multifocally stenotic small diameter enhancing lumen to the left vertebral artery that loses its enhancement in arterial phase as it extends superiorly within the cervical spine and C1 ring level, loses all enhancement, may be occluded or just be slow flow that does not fill during arterial phase, and then the mid and distal intracranial segment of the left vertebral artery is patent to the vertebrobasilar junction, possibly from a retrograde flow the basilar artery. The left common carotid artery origin is moderately narrowed by 50% by calcified plaques in the aortic arch, mid and distal left common carotid artery is within normal limits. There is a circumferential noncalcified plaque that mildly narrows the left common carotid bifurcation, extends into the origin of the left internal carotid artery, but there is essentially no  stenosis in left internal carotid artery using the NASCET criteria. Brachycephalic artery origins mildly narrowed by calcified plaque, but is otherwise patent to its bifurcation. There is calcified plaque mildly narrowing the subclavian artery origin. The right vertebral artery origin is only mildly narrowed. The right vertebral artery is dominant vertebral artery and is widely patent beyond its origin to the vertebrobasilar junction without stenosis. The right common carotid origin is normal in appearance and no stenosis seen in the right common carotid artery to its bifurcation where there is a heavily calcified plaque that moderately narrows the right common carotid bifurcation, extends into the origin of the proximal aspect of the right internal carotid artery and appears to up to severely narrow the origin of the right internal carotid artery by greater than 70% using the NASCET criteria. There is moderate-to-severe stenosis of the right external carotid origin. The remainder of the right internal carotid artery is within normal limits.  The CT angiogram images through the head demonstrate a diseased upper cervical left vertebral artery and no enhancement in the lumen of the left vertebral artery from the C2 cervical level to its mid intracranial segment where there is enhancement in the mid and distal intracranial segment of the left vertebral artery, likely from retrograde flow from the basilar artery to just proximal to PICA origin and the dominant distal right vertebral artery is widely patent to the vertebrobasilar junction. The basilar artery and basilar tip is normal in appearance. There is a markedly hypoplastic P1 segment left posterior cerebral artery with an area of  persistent fetal origin left posterior cerebral artery off the back wall of the supracavernous left internal carotid artery which is a normal anatomic variation. The posterior cerebral and superior cerebellar arteries are normal in  appearance. There are calcified plaques mildly narrowing the cavernous segments of the internal carotid arteries. Superior cavernous segments are widely patent without stenosis to the carotid termini. The anterior cerebral arteries and anterior communicating artery origin are normal in appearance. The M1 segment of the right middle cerebral artery and right middle cerebral artery trifurcation is normal in appearance. Extending from the very distal M1 segment of the left middle cerebral artery into the origin and proximal 5 mm of the anterior-superior division M2 segment is an intraluminal filling defect felt to be thrombus, and there is some extension into the origin proximal aspect of the posterior-inferior division M2 segment by several millimeters, as well and there is good filling of the mid and distal posterior-inferior division M2 segment and then it bifurcates into the M3 branches and there is occlusion of the left temporoparietal M3 branch of the posterior-inferior division, likely a chronic occlusion that occurred at the time of the old infarct:  IMPRESSION: 1. There are heavily calcified plaques involving the aortic arch and extending into the origins of the great vessels off the aortic arch. Heavily calcified plaque severely narrows the left subclavian artery origin, moderately narrows the proximal left subclavian artery. There is a heavily calcified plaque that severely narrows the left vertebral artery origin and the proximal and mid left vertebral artery is a severely diseased multifocally stenotic vessel with a tiny diameter enhancing lumen and the intensity of the enhancement in the lumen fades as it goes superiorly in the cervical spine and there is nonenhancement in the left vertebral lumen from C1-C2 cervical level to its proximal intracranial segment where there is enhancement in the intracranial segment of the left vertebral artery to the PICA origin, post PICA segment is normal to the  vertebrobasilar junction and the contrast filling the intracranial of the left vertebral artery is likely from retrograde flow from the basilar artery.  2. Heavily calcified plaque moderate-to-severely narrows the right common carotid bifurcation by 70%, extends into the origin and severely narrows the origin of the right internal carotid artery by greater than 70% using the NASCET criteria and moderate-to-severely narrows the right external carotid origin.  3. Extensive pansinusitis with subtotal opacification of the frontal and ethmoid maxillary sinus and complete opacification of the sphenoid sinuses bilaterally with fluid and mucosal thickening.  4. There is a moderate sized old infarct in the posterior-inferior division left middle cerebral artery territory that occurred back in June 2008, extends throughout the mid and inferior left parietal lobe into the posterior superior left temporal lobe and distribution temporoparietal branch of the left posterior-inferior division left middle cerebral artery territory. The old infarct measures 3.6 x 3.5 x 4 cm.  5. On the noncontrast head CT images there are subtle parenchymal edema most consistent with early acute infarction involving the mid to inferior lateral left frontal lobe, involves portion of the anterior to mid left corona radiata region, majority of the left external capsule and left insular cortex, measures up to 6 x 3 x 4 cm in anterior posterior, medial lateral and craniocaudal dimension. On the CT perfusion study of the head there is a 49 cc volume of brain parenchyma that is ischemic and in the center of it is a 26 cc volume of acute completed infarction involving the mid and inferior lateral left frontal lobe extending to the left external capsule and insular cortex, suggesting that there is some brain that is hypoperfused but not yet infarcted in the anterior-superior division left middle cerebral artery territory. Furthermore on the CT angiogram  images of the head there is early M2 branch off the inferior aspect of the mid to distal M1 segment of the left middle cerebral artery that is widely patent and there is an acute embolus that involves a very distal aspect of the M1 segment of the left middle cerebral artery extending the proximal 4-5 mm of the superior and inferior division M2 segments of the left middle cerebral artery. Furthermore, the mid and distal inferior-posterior division left middle cerebral artery fills well with contrast but the temporoparietal M3 branch in the inferior division left middle cerebral artery territory does not fill with contrast and is felt to be occluded and likely chronically occluded as this leads to the old area of infarction in the left temporoparietal region.  Results of the final dictated exam were communicated to Dr. Solis and Dr. Timmons by telephone on 07/06/2018 at 1:50 PM. Radiation dose reduction techniques were utilized, including automated exposure control and exposure modulation based on body size.  This report was finalized on 7/6/2018 4:58 PM by Dr. Jourdan Blelo M.D.      Ct Angiogram Neck With & Without Contrast    Result Date: 7/6/2018   1. There is a moderate sized old infarct extending throughout the lateral left parietal lobe into the posterior superior lateral left temporal lobe and posterior left insular cortex and this old infarct in the posterior-inferior division left middle cerebral artery territory maximally measures up to 3.6 x 3.5 x 4 cm medial lateral, anterior posterior and craniocaudal dimension, and this infarct occurred back in June 2008, 10 years ago.  2. There is subtle parenchymal edema most consistent with an area of early acute infarction involving the mid and inferior lateral left frontal lobe, the anterior mid left corona radiata region, superior lateral putamen, entire left external capsule and left insular cortex, measures 6.5 x 3.3 x 4 cm in anterior posterior, medial lateral and  craniocaudal dimension.  This acute infarct is in the anterior-superior division left middle cerebral artery territory and there is no hemorrhagic transformation.  3. Extensive pansinusitis as described.  4. The remainder of the head CT is unremarkable.  The results were communicated to Dr. Timmons while the patient was still on the table 07/06/2018 at 12:55 PM and he requested a contrast-enhanced CT angiogram of the head and neck and CT perfusion study of the head, and subsequently spiral CT images were obtained through the head during the arterial phase of contrast and images were reformatted and analyzed with rapid software analysis for CT perfusion study of the head. Subsequently, spiral CT images were obtained from the top of the aortic arch up through the great vessels of the head and neck during the arterial phase of contrast and images were reformatted and submitted in 1 mm thick axial CT section, 1 mm thick sagittal and coronal reconstructions and 3D reconstructions were performed to complete the CT perfusion study of the head. Finally, spiral CT images were obtained from the base of the skull to vertex delayed following intravenous contrast and these images were reformatted and submitted in brain algorithm, 3 mm thick axial CT sections.  CT PERFUSION STUDY: There is a moderate sized area of delayed time to T-max of greater than 6 seconds extending throughout the lateral left frontal lobe into the left insular cortex. Volume of brain involved on exam measures 49 cc and there is a smaller area of reduced cerebral blood flow to less than 30% measuring 26 cc that involves mid and inferior lateral left frontal lobe, as well as the left insular cortex and external capsule, compatible with area of acute completed infarction. The infarct appears slightly bigger on the noncontrast head CT, but based on CT perfusion study the mismatch volume is 23 mm for the brain.  CT ANGIOGRAM HEAD AND NECK FINDINGS: Again  demonstrated is a pansinusitis. The nasopharynx, oropharynx, hypopharynx, true cords, and subglottic airway are normal in appearance. Thyroid gland, right lobe is normal in appearance, left lobe is smaller, may be partially surgically absent or just atrophic. There are emphysematous changes in upper lung zone lung apices bilaterally. There is an irregular cluster of small patchy nodular opacity in the posterior inferior lateral left upper lobe, anterior to the superior aspect of the major fissure that measures 2.8 x 1.4 cm, nonspecific, could be a focal inflammatory infiltrate. The parotid, , parapharyngeal and submandibular spaces are symmetric and are normal in appearance. There is lower cervical spondylosis with disc space narrowing, degenerative endplate change, posterior endplate spurring, and uncovertebral joint hypertrophy and mild facet overgrowth contributing to mild canal and moderate bilateral foraminal narrowing at C5-C6, mild canal and mild left and moderate right bony foraminal narrowing at C6-C7.  CT angiogram images through the neck demonstrate extensive calcified plaques throughout the aortic arch, calcified plaque severely narrows the left subclavian artery origin. There are heavily calcified plaques moderately narrowing the proximal aspect of the left subclavian artery. There is calcified plaque that severely narrows the left vertebral artery origin and there is a diseased multifocally stenotic small diameter enhancing lumen to the left vertebral artery that loses its enhancement in arterial phase as it extends superiorly within the cervical spine and C1 ring level, loses all enhancement, may be occluded or just be slow flow that does not fill during arterial phase, and then the mid and distal intracranial segment of the left vertebral artery is patent to the vertebrobasilar junction, possibly from a retrograde flow the basilar artery. The left common carotid artery origin is moderately  narrowed by 50% by calcified plaques in the aortic arch, mid and distal left common carotid artery is within normal limits. There is a circumferential noncalcified plaque that mildly narrows the left common carotid bifurcation, extends into the origin of the left internal carotid artery, but there is essentially no stenosis in left internal carotid artery using the NASCET criteria. Brachycephalic artery origins mildly narrowed by calcified plaque, but is otherwise patent to its bifurcation. There is calcified plaque mildly narrowing the subclavian artery origin. The right vertebral artery origin is only mildly narrowed. The right vertebral artery is dominant vertebral artery and is widely patent beyond its origin to the vertebrobasilar junction without stenosis. The right common carotid origin is normal in appearance and no stenosis seen in the right common carotid artery to its bifurcation where there is a heavily calcified plaque that moderately narrows the right common carotid bifurcation, extends into the origin of the proximal aspect of the right internal carotid artery and appears to up to severely narrow the origin of the right internal carotid artery by greater than 70% using the NASCET criteria. There is moderate-to-severe stenosis of the right external carotid origin. The remainder of the right internal carotid artery is within normal limits.  The CT angiogram images through the head demonstrate a diseased upper cervical left vertebral artery and no enhancement in the lumen of the left vertebral artery from the C2 cervical level to its mid intracranial segment where there is enhancement in the mid and distal intracranial segment of the left vertebral artery, likely from retrograde flow from the basilar artery to just proximal to PICA origin and the dominant distal right vertebral artery is widely patent to the vertebrobasilar junction. The basilar artery and basilar tip is normal in appearance. There is a  markedly hypoplastic P1 segment left posterior cerebral artery with an area of  persistent fetal origin left posterior cerebral artery off the back wall of the supracavernous left internal carotid artery which is a normal anatomic variation. The posterior cerebral and superior cerebellar arteries are normal in appearance. There are calcified plaques mildly narrowing the cavernous segments of the internal carotid arteries. Superior cavernous segments are widely patent without stenosis to the carotid termini. The anterior cerebral arteries and anterior communicating artery origin are normal in appearance. The M1 segment of the right middle cerebral artery and right middle cerebral artery trifurcation is normal in appearance. Extending from the very distal M1 segment of the left middle cerebral artery into the origin and proximal 5 mm of the anterior-superior division M2 segment is an intraluminal filling defect felt to be thrombus, and there is some extension into the origin proximal aspect of the posterior-inferior division M2 segment by several millimeters, as well and there is good filling of the mid and distal posterior-inferior division M2 segment and then it bifurcates into the M3 branches and there is occlusion of the left temporoparietal M3 branch of the posterior-inferior division, likely a chronic occlusion that occurred at the time of the old infarct:  IMPRESSION: 1. There are heavily calcified plaques involving the aortic arch and extending into the origins of the great vessels off the aortic arch. Heavily calcified plaque severely narrows the left subclavian artery origin, moderately narrows the proximal left subclavian artery. There is a heavily calcified plaque that severely narrows the left vertebral artery origin and the proximal and mid left vertebral artery is a severely diseased multifocally stenotic vessel with a tiny diameter enhancing lumen and the intensity of the enhancement in the lumen fades  as it goes superiorly in the cervical spine and there is nonenhancement in the left vertebral lumen from C1-C2 cervical level to its proximal intracranial segment where there is enhancement in the intracranial segment of the left vertebral artery to the PICA origin, post PICA segment is normal to the vertebrobasilar junction and the contrast filling the intracranial of the left vertebral artery is likely from retrograde flow from the basilar artery.  2. Heavily calcified plaque moderate-to-severely narrows the right common carotid bifurcation by 70%, extends into the origin and severely narrows the origin of the right internal carotid artery by greater than 70% using the NASCET criteria and moderate-to-severely narrows the right external carotid origin.  3. Extensive pansinusitis with subtotal opacification of the frontal and ethmoid maxillary sinus and complete opacification of the sphenoid sinuses bilaterally with fluid and mucosal thickening.  4. There is a moderate sized old infarct in the posterior-inferior division left middle cerebral artery territory that occurred back in June 2008, extends throughout the mid and inferior left parietal lobe into the posterior superior left temporal lobe and distribution temporoparietal branch of the left posterior-inferior division left middle cerebral artery territory. The old infarct measures 3.6 x 3.5 x 4 cm.  5. On the noncontrast head CT images there are subtle parenchymal edema most consistent with early acute infarction involving the mid to inferior lateral left frontal lobe, involves portion of the anterior to mid left corona radiata region, majority of the left external capsule and left insular cortex, measures up to 6 x 3 x 4 cm in anterior posterior, medial lateral and craniocaudal dimension. On the CT perfusion study of the head there is a 49 cc volume of brain parenchyma that is ischemic and in the center of it is a 26 cc volume of acute completed infarction  involving the mid and inferior lateral left frontal lobe extending to the left external capsule and insular cortex, suggesting that there is some brain that is hypoperfused but not yet infarcted in the anterior-superior division left middle cerebral artery territory. Furthermore on the CT angiogram images of the head there is early M2 branch off the inferior aspect of the mid to distal M1 segment of the left middle cerebral artery that is widely patent and there is an acute embolus that involves a very distal aspect of the M1 segment of the left middle cerebral artery extending the proximal 4-5 mm of the superior and inferior division M2 segments of the left middle cerebral artery. Furthermore, the mid and distal inferior-posterior division left middle cerebral artery fills well with contrast but the temporoparietal M3 branch in the inferior division left middle cerebral artery territory does not fill with contrast and is felt to be occluded and likely chronically occluded as this leads to the old area of infarction in the left temporoparietal region.  Results of the final dictated exam were communicated to Dr. Solis and Dr. Timmons by telephone on 07/06/2018 at 1:50 PM. Radiation dose reduction techniques were utilized, including automated exposure control and exposure modulation based on body size.  This report was finalized on 7/6/2018 4:58 PM by Dr. Jourdan Bello M.D.      Mri Brain Without Contrast    Result Date: 7/7/2018  1. Large, acute left MCA distribution infarct with mild mass effect, no acute hemorrhage appreciated. 2. Atrophy and other chronic appearing changes as discussed. 3. Extensive pansinusitis  This report was finalized on 7/7/2018 4:58 AM by Nico Diaz M.D.      Xr Chest 1 View    Result Date: 7/3/2018  No focal pulmonary consolidation. Borderline heart size. Tortuous aorta. Follow-up/further evaluation can be obtained as indicated.  This report was finalized on 7/3/2018 6:42 PM by Dr. Middleton  CORRINA Palacios M.D.      Ct Cerebral Perfusion With & Without Contrast    Result Date: 7/6/2018   1. There is a moderate sized old infarct extending throughout the lateral left parietal lobe into the posterior superior lateral left temporal lobe and posterior left insular cortex and this old infarct in the posterior-inferior division left middle cerebral artery territory maximally measures up to 3.6 x 3.5 x 4 cm medial lateral, anterior posterior and craniocaudal dimension, and this infarct occurred back in June 2008, 10 years ago.  2. There is subtle parenchymal edema most consistent with an area of early acute infarction involving the mid and inferior lateral left frontal lobe, the anterior mid left corona radiata region, superior lateral putamen, entire left external capsule and left insular cortex, measures 6.5 x 3.3 x 4 cm in anterior posterior, medial lateral and craniocaudal dimension.  This acute infarct is in the anterior-superior division left middle cerebral artery territory and there is no hemorrhagic transformation.  3. Extensive pansinusitis as described.  4. The remainder of the head CT is unremarkable.  The results were communicated to Dr. Timmons while the patient was still on the table 07/06/2018 at 12:55 PM and he requested a contrast-enhanced CT angiogram of the head and neck and CT perfusion study of the head, and subsequently spiral CT images were obtained through the head during the arterial phase of contrast and images were reformatted and analyzed with rapid software analysis for CT perfusion study of the head. Subsequently, spiral CT images were obtained from the top of the aortic arch up through the great vessels of the head and neck during the arterial phase of contrast and images were reformatted and submitted in 1 mm thick axial CT section, 1 mm thick sagittal and coronal reconstructions and 3D reconstructions were performed to complete the CT perfusion study of the head. Finally, spiral CT  images were obtained from the base of the skull to vertex delayed following intravenous contrast and these images were reformatted and submitted in brain algorithm, 3 mm thick axial CT sections.  CT PERFUSION STUDY: There is a moderate sized area of delayed time to T-max of greater than 6 seconds extending throughout the lateral left frontal lobe into the left insular cortex. Volume of brain involved on exam measures 49 cc and there is a smaller area of reduced cerebral blood flow to less than 30% measuring 26 cc that involves mid and inferior lateral left frontal lobe, as well as the left insular cortex and external capsule, compatible with area of acute completed infarction. The infarct appears slightly bigger on the noncontrast head CT, but based on CT perfusion study the mismatch volume is 23 mm for the brain.  CT ANGIOGRAM HEAD AND NECK FINDINGS: Again demonstrated is a pansinusitis. The nasopharynx, oropharynx, hypopharynx, true cords, and subglottic airway are normal in appearance. Thyroid gland, right lobe is normal in appearance, left lobe is smaller, may be partially surgically absent or just atrophic. There are emphysematous changes in upper lung zone lung apices bilaterally. There is an irregular cluster of small patchy nodular opacity in the posterior inferior lateral left upper lobe, anterior to the superior aspect of the major fissure that measures 2.8 x 1.4 cm, nonspecific, could be a focal inflammatory infiltrate. The parotid, , parapharyngeal and submandibular spaces are symmetric and are normal in appearance. There is lower cervical spondylosis with disc space narrowing, degenerative endplate change, posterior endplate spurring, and uncovertebral joint hypertrophy and mild facet overgrowth contributing to mild canal and moderate bilateral foraminal narrowing at C5-C6, mild canal and mild left and moderate right bony foraminal narrowing at C6-C7.  CT angiogram images through the neck  demonstrate extensive calcified plaques throughout the aortic arch, calcified plaque severely narrows the left subclavian artery origin. There are heavily calcified plaques moderately narrowing the proximal aspect of the left subclavian artery. There is calcified plaque that severely narrows the left vertebral artery origin and there is a diseased multifocally stenotic small diameter enhancing lumen to the left vertebral artery that loses its enhancement in arterial phase as it extends superiorly within the cervical spine and C1 ring level, loses all enhancement, may be occluded or just be slow flow that does not fill during arterial phase, and then the mid and distal intracranial segment of the left vertebral artery is patent to the vertebrobasilar junction, possibly from a retrograde flow the basilar artery. The left common carotid artery origin is moderately narrowed by 50% by calcified plaques in the aortic arch, mid and distal left common carotid artery is within normal limits. There is a circumferential noncalcified plaque that mildly narrows the left common carotid bifurcation, extends into the origin of the left internal carotid artery, but there is essentially no stenosis in left internal carotid artery using the NASCET criteria. Brachycephalic artery origins mildly narrowed by calcified plaque, but is otherwise patent to its bifurcation. There is calcified plaque mildly narrowing the subclavian artery origin. The right vertebral artery origin is only mildly narrowed. The right vertebral artery is dominant vertebral artery and is widely patent beyond its origin to the vertebrobasilar junction without stenosis. The right common carotid origin is normal in appearance and no stenosis seen in the right common carotid artery to its bifurcation where there is a heavily calcified plaque that moderately narrows the right common carotid bifurcation, extends into the origin of the proximal aspect of the right  internal carotid artery and appears to up to severely narrow the origin of the right internal carotid artery by greater than 70% using the NASCET criteria. There is moderate-to-severe stenosis of the right external carotid origin. The remainder of the right internal carotid artery is within normal limits.  The CT angiogram images through the head demonstrate a diseased upper cervical left vertebral artery and no enhancement in the lumen of the left vertebral artery from the C2 cervical level to its mid intracranial segment where there is enhancement in the mid and distal intracranial segment of the left vertebral artery, likely from retrograde flow from the basilar artery to just proximal to PICA origin and the dominant distal right vertebral artery is widely patent to the vertebrobasilar junction. The basilar artery and basilar tip is normal in appearance. There is a markedly hypoplastic P1 segment left posterior cerebral artery with an area of  persistent fetal origin left posterior cerebral artery off the back wall of the supracavernous left internal carotid artery which is a normal anatomic variation. The posterior cerebral and superior cerebellar arteries are normal in appearance. There are calcified plaques mildly narrowing the cavernous segments of the internal carotid arteries. Superior cavernous segments are widely patent without stenosis to the carotid termini. The anterior cerebral arteries and anterior communicating artery origin are normal in appearance. The M1 segment of the right middle cerebral artery and right middle cerebral artery trifurcation is normal in appearance. Extending from the very distal M1 segment of the left middle cerebral artery into the origin and proximal 5 mm of the anterior-superior division M2 segment is an intraluminal filling defect felt to be thrombus, and there is some extension into the origin proximal aspect of the posterior-inferior division M2 segment by several  millimeters, as well and there is good filling of the mid and distal posterior-inferior division M2 segment and then it bifurcates into the M3 branches and there is occlusion of the left temporoparietal M3 branch of the posterior-inferior division, likely a chronic occlusion that occurred at the time of the old infarct:  IMPRESSION: 1. There are heavily calcified plaques involving the aortic arch and extending into the origins of the great vessels off the aortic arch. Heavily calcified plaque severely narrows the left subclavian artery origin, moderately narrows the proximal left subclavian artery. There is a heavily calcified plaque that severely narrows the left vertebral artery origin and the proximal and mid left vertebral artery is a severely diseased multifocally stenotic vessel with a tiny diameter enhancing lumen and the intensity of the enhancement in the lumen fades as it goes superiorly in the cervical spine and there is nonenhancement in the left vertebral lumen from C1-C2 cervical level to its proximal intracranial segment where there is enhancement in the intracranial segment of the left vertebral artery to the PICA origin, post PICA segment is normal to the vertebrobasilar junction and the contrast filling the intracranial of the left vertebral artery is likely from retrograde flow from the basilar artery.  2. Heavily calcified plaque moderate-to-severely narrows the right common carotid bifurcation by 70%, extends into the origin and severely narrows the origin of the right internal carotid artery by greater than 70% using the NASCET criteria and moderate-to-severely narrows the right external carotid origin.  3. Extensive pansinusitis with subtotal opacification of the frontal and ethmoid maxillary sinus and complete opacification of the sphenoid sinuses bilaterally with fluid and mucosal thickening.  4. There is a moderate sized old infarct in the posterior-inferior division left middle cerebral  artery territory that occurred back in June 2008, extends throughout the mid and inferior left parietal lobe into the posterior superior left temporal lobe and distribution temporoparietal branch of the left posterior-inferior division left middle cerebral artery territory. The old infarct measures 3.6 x 3.5 x 4 cm.  5. On the noncontrast head CT images there are subtle parenchymal edema most consistent with early acute infarction involving the mid to inferior lateral left frontal lobe, involves portion of the anterior to mid left corona radiata region, majority of the left external capsule and left insular cortex, measures up to 6 x 3 x 4 cm in anterior posterior, medial lateral and craniocaudal dimension. On the CT perfusion study of the head there is a 49 cc volume of brain parenchyma that is ischemic and in the center of it is a 26 cc volume of acute completed infarction involving the mid and inferior lateral left frontal lobe extending to the left external capsule and insular cortex, suggesting that there is some brain that is hypoperfused but not yet infarcted in the anterior-superior division left middle cerebral artery territory. Furthermore on the CT angiogram images of the head there is early M2 branch off the inferior aspect of the mid to distal M1 segment of the left middle cerebral artery that is widely patent and there is an acute embolus that involves a very distal aspect of the M1 segment of the left middle cerebral artery extending the proximal 4-5 mm of the superior and inferior division M2 segments of the left middle cerebral artery. Furthermore, the mid and distal inferior-posterior division left middle cerebral artery fills well with contrast but the temporoparietal M3 branch in the inferior division left middle cerebral artery territory does not fill with contrast and is felt to be occluded and likely chronically occluded as this leads to the old area of infarction in the left temporoparietal  region.  Results of the final dictated exam were communicated to Dr. Solis and Dr. Timmons by telephone on 07/06/2018 at 1:50 PM. Radiation dose reduction techniques were utilized, including automated exposure control and exposure modulation based on body size.  This report was finalized on 7/6/2018 4:58 PM by Dr. Jourdan Bello M.D.      EKG: NSR  Impression:  Ms Huynh is an 89 yo female with PMH stroke (on plavix), HTN, HLD, anxiety/depression, hypothyroidism, CHF, CKD, CAD, COPD, who presented with speech difficulty and right facial droop. Symptoms were present upon waking. Pt had recent admission for uncontrolled HTN.    - MRI brain- acute large left MCA stroke, chronic posterior left MCA stroke  - CTA h/n: extensive calcification and plaque in proximal vessels with left subclavian stenosis, left vertebral stenosis with likely back filling from basilar to post-PICA vert, R CCA 70% narrowing, R ICA 70% stenosis, Distal L M1 acute embolus  - CTP: shows perfusion mismatch    Dx: L MCA stroke secondary to L M1. Pt was out of the window for tPA and not felt to be a candidate for intervention by [facility] given her age, comorbidities, time window, location of the thrombus, amount of area at risk from perfusion studies.     Plan:  - Imaging reviewed Dr Bazan with daughters.  - Telemetry   - PTOT speech rehabilitation assessment  - DVT prophylaxis  - Swallow study  - Statins  - Echocardiogram.  - DAPT.  - Cardiac key (Holter) to monitor heart for arrhythmias at discharge  - guarded prognosis.  - TTE pending. Cardiology does not recommend RAFAEL per note.  As per family/POA patient is DNR/DNI.

## 2018-07-07 NOTE — SIGNIFICANT NOTE
07/07/18 1025   Rehab Time/Intention   Evaluation Not Performed patient unavailable for evaluation  (transport present to take to cardio 1025)   Rehab Treatment   Discipline occupational therapist

## 2018-07-07 NOTE — PLAN OF CARE
Problem: Patient Care Overview  Goal: Plan of Care Review   07/07/18 1017   OTHER   Outcome Summary Pt eshibits moderate oropharyngeal dysphagia.   Coping/Psychosocial   Plan of Care Reviewed With patient;daughter      07/07/18 1017   OTHER   Outcome Summary Pt eshibits moderate oropharyngeal dysphagia.   Coping/Psychosocial   Plan of Care Reviewed With patient;daughter

## 2018-07-07 NOTE — THERAPY EVALUATION
Acute Care - Speech Language Pathology   Swallow Initial Evaluation [facility]     Patient Name: Meggan Huynh  : 1929  MRN: [id number]  Today's Date: 2018  Onset of Illness/Injury or Date of Surgery: (P) 18     Referring Physician: (P) Dr. Keshia Timmons MD       Admit Date: 2018    Visit Dx:     ICD-10-CM ICD-9-CM   1. Cerebrovascular accident (CVA) due to occlusion of left middle cerebral artery (CMS/HCC) I63.512 434.91   2. Chronic systolic congestive heart failure (CMS/HCC) I50.22 428.22     428.0   3. Chronic bronchitis, unspecified chronic bronchitis type (CMS/HCC) J42 491.9   4. Oropharyngeal dysphagia R13.12 787.22   5. Generalized weakness R53.1 780.79     Patient Active Problem List   Diagnosis   • Cerebrovascular accident (CVA) due to occlusion of left middle cerebral artery (CMS/HCC)   • Hypothyroidism   • Hypertension   • Hyperlipidemia   • Chronic renal disease, stage 3, moderately decreased glomerular filtration rate between 30-59 mL/min/1.73 square meter   • H/O CHF     Past Medical History:   Diagnosis Date   • Ankle pain, right    • Anxiety    • CHF (congestive heart failure) (CMS/HCC)     with preserved EF   • Chronic renal disease, stage 3, moderately decreased glomerular filtration rate between 30-59 mL/min/1.73 square meter    • COPD (chronic obstructive pulmonary disease) (CMS/HCC)    • Coronary artery disease    • Depression    • Hyperlipidemia    • Hypertension    • Hypothyroidism    • Stroke (CMS/HCC)      History reviewed. No pertinent surgical history.       SWALLOW EVALUATION (last 72 hours)      SLP Adult Swallow Evaluation     Row Name 18 0900                   Rehab Evaluation    Document Type evaluation  -LS        Patient Observations alert;cooperative  -LS        Patient Effort good  -LS           General Eating/Swallowing Observations    Utensils Used spoon;cup  -LS           Respiratory    Respiratory Status room air  -LS           Clinical  Swallow Eval    Oral Prep Phase WFL  -LS        Oral Residue WFL  -LS        Pharyngeal Phase suspected pharyngeal impairment  -LS        Clinical Swallow Evaluation Summary --   pt presents with suspect phoebe pharyngeal dysphagia.  -LS           Pharyngeal Phase Concerns    Pharyngeal Phase Concerns other (see comments)  -LS        Pharyngeal Phase Concerns, Comment --   audible swallow with all consistencies.  -LS           Clinical Impression    SLP Swallowing Diagnosis moderate;oral dysfunction;suspected pharyngeal dysfunction  -LS        Rehab Potential/Prognosis, Swallowing good, to achieve stated therapy goals  -LS        Criteria for Skilled Therapeutic Interventions Met demonstrates skilled criteria  -LS           Recommendations    Therapy Frequency (Swallow) PRN  -LS        Predicted Duration Therapy Intervention (Days) until discharge  -        SLP Diet Recommendation --   treatment feeds of puree/honey thick liquids and a chin tuck  -LS        Recommended Diagnostics VFSS (MBS)  -        Recommended Precautions and Strategies upright posture during/after eating;small bites of food and sips of liquid;no straw;alternate between small bites of food and sips of liquid;chin Tuck  -LS        SLP Rec. for Method of Medication Administration meds crushed;with pudding or applesauce   must perform chin tuck.  -        Monitor for Signs of Aspiration yes;notify SLP if any concerns;cough;elevated WBC count;gurgly voice;throat clearing;fever  -          User Key  (r) = Recorded By, (t) = Taken By, (c) = Cosigned By    Initials Name Effective Dates    JORDI Pisano MS CCC-SLP 06/08/18 -         EDUCATION  The patient has been educated in the following areas:   Dysphagia (Swallowing Impairment).    SLP Recommendation and Plan  SLP Swallowing Diagnosis: moderate, oral dysfunction, suspected pharyngeal dysfunction  SLP Diet Recommendation:  (treatment feeds of puree/honey thick liquids and a chin  scarlett)  Recommended Precautions and Strategies: upright posture during/after eating, small bites of food and sips of liquid, no straw, alternate between small bites of food and sips of liquid, chin Tuck     Monitor for Signs of Aspiration: yes, notify SLP if any concerns, cough, elevated WBC count, gurgly voice, throat clearing, fever  Recommended Diagnostics: VFSS (MBS)  Criteria for Skilled Therapeutic Interventions Met: demonstrates skilled criteria     Rehab Potential/Prognosis, Swallowing: good, to achieve stated therapy goals  Therapy Frequency (Swallow): PRN  Predicted Duration Therapy Intervention (Days): until discharge       Plan of Care Reviewed With: patient, daughter  Plan of Care Review  Plan of Care Reviewed With: patient, daughter  Outcome Summary: Pt eshibits moderate oropharyngeal dysphagia.           SLP Outcome Measures (last 72 hours)      SLP Outcome Measures     Row Name 07/07/18 0900             SLP Outcome Measures    Outcome Measure Used? Adult NOMS  -         FCM Scores    FCM Chosen Swallowing  -      Swallowing FCM Score 1  -        User Key  (r) = Recorded By, (t) = Taken By, (c) = Cosigned By    Initials Name Effective Dates    JORDI Pisano MS CCC-SLP 06/08/18 -            Time Calculation:         Time Calculation- SLP     Row Name 07/07/18 1020 07/07/18 1011          Time Calculation- SLP    SLP Received On 07/07/18  -LS 07/07/18  -       User Key  (r) = Recorded By, (t) = Taken By, (c) = Cosigned By    Initials Name Provider Type    JORDI Pisano MS CCC-SLP Speech and Language Pathologist          Therapy Charges for Today     Code Description Service Date Service Provider Modifiers Qty    [id number]  ST EVAL ORAL PHARYNG SWALLOW 4 7/7/2018 Hannah Pisano MS CCC-BEBE GN 1               MS BRET Barker  7/7/2018

## 2018-07-07 NOTE — CONSULTS
Patient Name: Meggan Huynh  Age/Sex: 88 y.o. female  : 1929  MRN: [id number]    Date of Admission: 2018  Date of Encounter Visit: 18  Encounter Provider: Flako Sanchez MD  Place of Service: [facility] CARDIOLOGY      Referring Provider: Naynaa Walker, *  Patient Care Team:  Devan Snow Jr., MD as PCP - General (Internal Medicine)  Devan Snow Jr., MD as PCP - Claims Attributed  Jennifer Frank RN as Care Coordinator (Population Health)    Subjective:     Admitted/Consulted for: cva, cardioembolic w/u    Chief Complaint: mental status changes      [id number] Score: 7     History of Present Illness:  Meggan Huynh is a 88 y.o. female who follows with Dr. Mendieta and has a history of CAD, diastolic heart failure, CKD, COPD, HTN, HLD, and CVA. She presented to ED yesterday with altered mental status, garbled speech, and right side facial droop.The patient woke up with the symptoms.  Her last known normal state was about 8 PM the night prior to her arrival.  She is nonverbal and her family serves as her historian.  Her CT angiogram demonstrated a perfusion mismatch in the left MCA and also left M2 occlusion.  Neurosurgery deemed the patient to be not a candidate for invasive approaches and the family agreed.  TPA was not given.  She is now on aspirin and Plavix.  Her blood pressure is mildly elevated.    She was last seen in office by Dr. Mendieta on 17 for routine follow-up requiring no changes. Her last echocardiogram was in May 2016 which showed normal LV function and moderate tricuspid regurgitation; unchanged from previous echo in . Her last stress test was in 2012 and was negative for ischemia.    She has been noted in her primary care's office to have intermittent tachycardia with ventricular rates of about 130 bpm.  He was noted to have a regular tachycardia with an IVCD.  No evidence of flutter waves or  irregularity.  Appears to be more likely an atrial tachycardia    Previous testing:   Echocardiogram May 2016  Conclusions:  The study quality is good.   There is normal left ventricular systolic function.  Mild concentric left ventricular hypertrophy is observed.  Normal left ventricular diastolic filling is observed.  There is mild aortic regurgitation.   There is mild mitral regurgitation.   There is moderate tricuspid regurgitation.  The right ventricular systolic pressure is calculated at 34 mmHg.   There is no pericardial effusion.  Electronically signed by Richard Salvador MD>  05/20/2016 1233    Stress Test August 2012  IMPRESSION:     1. Normal stress Cardiolite.     2. No evidence of ischemia or infarct.    3. Normal left ventricular systolic function.  GUERO TAPIA M.D.  08/31/2012 13:49:46    Past Medical History:  Past Medical History:   Diagnosis Date   • Ankle pain, right    • Anxiety    • CHF (congestive heart failure) (CMS/HCC)     with preserved EF   • Chronic renal disease, stage 3, moderately decreased glomerular filtration rate between 30-59 mL/min/1.73 square meter    • COPD (chronic obstructive pulmonary disease) (CMS/[facility])    • Coronary artery disease    • Depression    • Hyperlipidemia    • Hypertension    • Hypothyroidism    • Stroke (CMS/HCC)        History reviewed. No pertinent surgical history.    Home Medications:   Prescriptions Prior to Admission   Medication Sig Dispense Refill Last Dose   • azelastine (ASTELIN) 0.1 % nasal spray 1 spray into each nostril Daily. Mixed with Flunisolide and Ipratropium   Taking   • Calcium 500-100 MG-UNIT chewable tablet Chew 1 tablet Daily Before Lunch.      • carvedilol (COREG) 3.125 MG tablet Take 6.25 mg by mouth Every Morning. Hold for blood pressure <100      • carvedilol (COREG) 3.125 MG tablet Take 3.125 mg by mouth Every Evening.      • cetirizine (ZyrTEC) 10 MG tablet Take 10 mg by mouth daily.   Taking   • Cholecalciferol (VITAMIN D)  2000 UNITS tablet Take 2,000 Units by mouth Every Morning.   Taking   • clopidogrel (PLAVIX) 75 MG tablet Take 75 mg by mouth Daily.      • escitalopram (LEXAPRO) 10 MG tablet Take 10 mg by mouth Every Night.   Taking   • flunisolide (NASALIDE) 25 MCG/ACT (0.025%) solution nasal spray Inhale 1 spray Daily. Mix with Azelastine and Ipratropium      • fluticasone-salmeterol (ADVAIR DISKUS) 250-50 MCG/DOSE DISKUS Inhale 1 puff Daily.      • furosemide (LASIX) 20 MG tablet Take 20 mg by mouth Every Morning.      • furosemide (LASIX) 20 MG tablet Take 10 mg by mouth Every Evening.      • ipratropium (ATROVENT) 0.03 % nasal spray 1 spray into each nostril Daily. Mix with Azelastine and Flunisolide      • levothyroxine (SYNTHROID, LEVOTHROID) 50 MCG tablet Take 50 mcg by mouth Daily.      • O2 (OXYGEN) Inhale 2 L/min Every Night.      • pravastatin (PRAVACHOL) 40 MG tablet Take 40 mg by mouth Every Night.      • Umeclidinium Bromide (INCRUSE ELLIPTA) 62.5 MCG/INH aerosol powder  Inhale 1 puff Daily. 2 each 0 Taking   • zafirlukast (ACCOLATE) 20 MG tablet Take 20 mg by mouth Daily As Needed.      • Fluticasone Furoate-Vilanterol (BREO ELLIPTA) 100-25 MCG/INH aerosol powder  One inhalation daily 1 each 0 Not Taking       Allergies:  Allergies   Allergen Reactions   • Codeine    • Hydrocodone    • Keflex [Cephalexin] Hives   • Levaquin [Levofloxacin In D5w]        Past Social History:  Social History     Social History   • Marital status: Single     Spouse name: N/A   • Number of children: N/A   • Years of education: N/A     Occupational History   • Not on file.     Social History Main Topics   • Smoking status: Former Smoker   • Smokeless tobacco: Not on file   • Alcohol use No   • Drug use: Unknown   • Sexual activity: Defer     Other Topics Concern   • Not on file     Social History Narrative   • No narrative on file        Past Family History:  History reviewed. No pertinent family history.      Review of Systems:  All  systems reviewed. Pertinent positives identified in HPI. All other systems are negative.       Objective:     Objective:  Temp:  [97.5 °F (36.4 °C)-98.3 °F (36.8 °C)] 97.8 °F (36.6 °C)  Heart Rate:  [] 66  Resp:  [16-20] 20  BP: (107-200)/(59-84) 162/64    Intake/Output Summary (Last 24 hours) at 07/07/18 1026  Last data filed at 07/07/18 0440   Gross per 24 hour   Intake                0 ml   Output              825 ml   Net             -825 ml     Body mass index is 31.77 kg/m².  1    07/06/18  1227   Weight: 78.8 kg (173 lb 11.2 oz)           Physical Exam:   General Appearance Pleasant.  Nonverbal    Head Normocephalic, without abnormality, atraumatic   Ears Ears appear intact with no abnormalities noted   Throat No oral lesions, no thrush, oral mucosa moist   Neck No adenopathy, supple, trachea midline, no thyromegaly, no carotid bruit, no JVD   Back No skin lesions, erythema or scars, no tenderness to percussion or palptaion,range of motion is normal   Lungs Clear to auscultation,respirations regular, even and unlabored   Heart Regular rhythm and normal rate, normal S1 and S2, no murmur, no gallop, no rub, no click   Chest wall No abnormalities observed   Abdomen Normal bowel sounds, no masses, no hepatomegaly,    Extremities Moves all extremities well, no edema, no cyanosis, no redness   Pulses Pulses palpable and equal bilaterally. Normal radial, carotid, femoral, dorsalis pedis and posterior tibial pulses bilaterally. Normal abdominal aorta   Skin No bleeding, bruising or rash           Lab Review:       Results from last 7 days  Lab Units 07/07/18  0618 07/06/18  1255 07/03/18  1725   SODIUM mmol/L 141 141 143   POTASSIUM mmol/L 3.5 3.8 4.4   CHLORIDE mmol/L 103 99 104   CO2 mmol/L 24.3 30.7* 24.2   BUN mg/dL 20 26* 30*   CREATININE mg/dL 1.02* 1.23* 1.43*   GLUCOSE mg/dL 109* 113* 87   CALCIUM mg/dL 7.6* 8.1* 8.5*         Results from last 7 days  Lab Units 07/03/18  1725   TROPONIN T ng/mL <0.010        Results from last 7 days  Lab Units 07/07/18 0618   WBC 10*3/mm3 9.96   HEMOGLOBIN g/dL 11.9   HEMATOCRIT % 37.8   PLATELETS 10*3/mm3 169       Results from last 7 days  Lab Units 07/06/18  1255   INR  1.11*       Results from last 7 days  Lab Units 07/07/18 0618   CHOLESTEROL mg/dL 107       Results from last 7 days  Lab Units 07/03/18  1725   MAGNESIUM mg/dL 2.1       Results from last 7 days  Lab Units 07/07/18 0618   CHOLESTEROL mg/dL 107   TRIGLYCERIDES mg/dL 106   HDL CHOL mg/dL 49   LDL CHOL mg/dL 37       Results from last 7 days  Lab Units 07/03/18  1725   PROBNP pg/mL 3,895.0*           Results from last 7 days  Lab Units 07/03/18  1725   TSH mIU/mL 2.980       Imaging:    Imaging Results (most recent)     Procedure Component Value Units Date/Time    MRI Brain Without Contrast [[id number]] Collected:  07/06/18 2152     Updated:  07/07/18 0501    Narrative:       BRAIN MRI WITHOUT GADOLINIUM     HISTORY: Stroke; I63.512-Cerebral infarction due to unspecified  occlusion or stenosis of left middle cerebral artery     COMPARISON: None     FINDINGS:  Multiplanar images of the head were obtained without the use  of intravenous contrast.     There is a fairly large area of restricted diffusion involving the more  superior lateral aspect of the left frontal lobe extending inferiorly  into the insula and periphery of the lateral basal ganglia compatible  with an acute infarct. This is in the distribution of the left middle  cerebral artery. There is only minimal associated edema and mass effect  is quite minimal with some mild sulcal narrowing and 2 mm of left to  right midline shift. There does not appear to be any associated  hemorrhage. There is a slightly smaller focus of encephalomalacia more  posteriorly on the left side involving the upper and lateral parietal  lobe extending into the upper margin of the left temporal lobe which is  chronic in appearance. There is diffuse atrophy. Minimal scattered  white  matter changes are present mostly in the periventricular white matter  and pontine region. There is some very slight ex vacuo dilatation of the  posterior body and occipital horn of the left lateral ventricle. There  is no hydrocephalus. There is no evidence of extra axial mass or fluid  collection. Pituitary gland, corpus callosum, and craniocervical  junction are unremarkable. The major vascular flow voids are patent.  There is extensive chronic-appearing pansinusitis. The globes and  orbital soft tissues are unremarkable.                Impression:       1. Large, acute left MCA distribution infarct with mild mass effect, no  acute hemorrhage appreciated.  2. Atrophy and other chronic appearing changes as discussed.  3. Extensive pansinusitis     This report was finalized on 7/7/2018 4:58 AM by Nico Diaz M.D.       CT Angiogram Head With & Without Contrast [[id number]] Collected:  07/06/18 1539     Updated:  07/06/18 1701    Narrative:       EMERGENCY CONTRAST-ENHANCED CT ANGIOGRAM OF HEAD AND NECK AND CT  PERFUSION STUDY OF THE HEAD 07/06/2018      CLINICAL HISTORY: Acute onset confusion, right facial weakness, speech  abnormality, acute stroke.     TECHNIQUE: Initially spiral CT images were obtained from the base of the  skull to the vertex without intravenous contrast and images were  reformatted and submitted in 3 mm thick axial CT section, 2 mm thick  sagittal and coronal reconstructions were performed and images are  submitted in brain algorithm.     FINDINGS ON NONCONTRAST HEAD CT: There is a moderate sized area of  encephalomalacia extending from the posterior superior left temporal  lobe into the lateral left parietal lobe. The area measures up to 3.6 x  3.5 x 4.5 cm in maximal medial lateral anterior posterior and  craniocaudal dimension, distribution of temporoparietal branches of the  posterior-inferior division left middle cerebral artery territory.  Furthermore, just anterior to this is a  more subtle area of parenchymal  low-density that involves the anterior mid left corona radiata region  and extends into the superior left putamen, involves the left external  capsule, the entire left insular cortex and extends into the mid and  inferior lateral left frontal lobe. This is compatible with an early  acute infarct in the anterior-superior division left middle cerebral  artery territory that measures up to 6.5 x 3.3 x 4 cm. There is no  hemorrhagic transformation, no intracranial hemorrhage seen. There is  mild low-density in the frontal periventricular white matter compatible  with mild small vessel disease. Ventricles are normal in size. There is  no midline shift, no extraaxial fluid collections are identified. Once  again, no acute intracranial hemorrhage is seen. There is very extensive  pansinus opacification with near complete opacification of frontal  ethmoid maxillary and sphenoid sinuses with fluid and mucosal thickening  bilaterally. Mastoid and middle ear cavities are clear.       Impression:          1. There is a moderate sized old infarct extending throughout the  lateral left parietal lobe into the posterior superior lateral left  temporal lobe and posterior left insular cortex and this old infarct in  the posterior-inferior division left middle cerebral artery territory  maximally measures up to 3.6 x 3.5 x 4 cm medial lateral, anterior  posterior and craniocaudal dimension, and this infarct occurred back in  June 2008, 10 years ago.     2. There is subtle parenchymal edema most consistent with an area of  early acute infarction involving the mid and inferior lateral left  frontal lobe, the anterior mid left corona radiata region, superior  lateral putamen, entire left external capsule and left insular cortex,  measures 6.5 x 3.3 x 4 cm in anterior posterior, medial lateral and  craniocaudal dimension.  This acute infarct is in the anterior-superior  division left middle cerebral artery  territory and there is no  hemorrhagic transformation.     3. Extensive pansinusitis as described.     4. The remainder of the head CT is unremarkable.     The results were communicated to Dr. Timmons while the patient was still  on the table 07/06/2018 at 12:55 PM and he requested a contrast-enhanced  CT angiogram of the head and neck and CT perfusion study of the head,  and subsequently spiral CT images were obtained through the head during  the arterial phase of contrast and images were reformatted and analyzed  with rapid software analysis for CT perfusion study of the head.  Subsequently, spiral CT images were obtained from the top of the aortic  arch up through the great vessels of the head and neck during the  arterial phase of contrast and images were reformatted and submitted in  1 mm thick axial CT section, 1 mm thick sagittal and coronal  reconstructions and 3D reconstructions were performed to complete the CT  perfusion study of the head. Finally, spiral CT images were obtained  from the base of the skull to vertex delayed following intravenous  contrast and these images were reformatted and submitted in brain  algorithm, 3 mm thick axial CT sections.     CT PERFUSION STUDY: There is a moderate sized area of delayed time to  T-max of greater than 6 seconds extending throughout the lateral left  frontal lobe into the left insular cortex. Volume of brain involved on  exam measures 49 cc and there is a smaller area of reduced cerebral  blood flow to less than 30% measuring 26 cc that involves mid and  inferior lateral left frontal lobe, as well as the left insular cortex  and external capsule, compatible with area of acute completed  infarction. The infarct appears slightly bigger on the noncontrast head  CT, but based on CT perfusion study the mismatch volume is 23 mm for the  brain.     CT ANGIOGRAM HEAD AND NECK FINDINGS: Again demonstrated is a  pansinusitis. The nasopharynx, oropharynx, hypopharynx, true  cords, and  subglottic airway are normal in appearance. Thyroid gland, right lobe is  normal in appearance, left lobe is smaller, may be partially surgically  absent or just atrophic. There are emphysematous changes in upper lung  zone lung apices bilaterally. There is an irregular cluster of small  patchy nodular opacity in the posterior inferior lateral left upper  lobe, anterior to the superior aspect of the major fissure that measures  2.8 x 1.4 cm, nonspecific, could be a focal inflammatory infiltrate. The  parotid, , parapharyngeal and submandibular spaces are  symmetric and are normal in appearance. There is lower cervical  spondylosis with disc space narrowing, degenerative endplate change,  posterior endplate spurring, and uncovertebral joint hypertrophy and  mild facet overgrowth contributing to mild canal and moderate bilateral  foraminal narrowing at C5-C6, mild canal and mild left and moderate  right bony foraminal narrowing at C6-C7.     CT angiogram images through the neck demonstrate extensive calcified  plaques throughout the aortic arch, calcified plaque severely narrows  the left subclavian artery origin. There are heavily calcified plaques  moderately narrowing the proximal aspect of the left subclavian artery.  There is calcified plaque that severely narrows the left vertebral  artery origin and there is a diseased multifocally stenotic small  diameter enhancing lumen to the left vertebral artery that loses its  enhancement in arterial phase as it extends superiorly within the  cervical spine and C1 ring level, loses all enhancement, may be occluded  or just be slow flow that does not fill during arterial phase, and then  the mid and distal intracranial segment of the left vertebral artery is  patent to the vertebrobasilar junction, possibly from a retrograde flow  the basilar artery. The left common carotid artery origin is moderately  narrowed by 50% by calcified plaques in the  aortic arch, mid and distal  left common carotid artery is within normal limits. There is a  circumferential noncalcified plaque that mildly narrows the left common  carotid bifurcation, extends into the origin of the left internal  carotid artery, but there is essentially no stenosis in left internal  carotid artery using the NASCET criteria. Brachycephalic artery origins  mildly narrowed by calcified plaque, but is otherwise patent to its  bifurcation. There is calcified plaque mildly narrowing the subclavian  artery origin. The right vertebral artery origin is only mildly  narrowed. The right vertebral artery is dominant vertebral artery and is  widely patent beyond its origin to the vertebrobasilar junction without  stenosis. The right common carotid origin is normal in appearance and no  stenosis seen in the right common carotid artery to its bifurcation  where there is a heavily calcified plaque that moderately narrows the  right common carotid bifurcation, extends into the origin of the  proximal aspect of the right internal carotid artery and appears to up  to severely narrow the origin of the right internal carotid artery by  greater than 70% using the NASCET criteria. There is moderate-to-severe  stenosis of the right external carotid origin. The remainder of the  right internal carotid artery is within normal limits.     The CT angiogram images through the head demonstrate a diseased upper  cervical left vertebral artery and no enhancement in the lumen of the  left vertebral artery from the C2 cervical level to its mid intracranial  segment where there is enhancement in the mid and distal intracranial  segment of the left vertebral artery, likely from retrograde flow from  the basilar artery to just proximal to PICA origin and the dominant  distal right vertebral artery is widely patent to the vertebrobasilar  junction. The basilar artery and basilar tip is normal in appearance.  There is a markedly  hypoplastic P1 segment left posterior cerebral  artery with an area of  persistent fetal origin left posterior cerebral  artery off the back wall of the supracavernous left internal carotid  artery which is a normal anatomic variation. The posterior cerebral and  superior cerebellar arteries are normal in appearance. There are  calcified plaques mildly narrowing the cavernous segments of the  internal carotid arteries. Superior cavernous segments are widely patent  without stenosis to the carotid termini. The anterior cerebral arteries  and anterior communicating artery origin are normal in appearance. The  M1 segment of the right middle cerebral artery and right middle cerebral  artery trifurcation is normal in appearance. Extending from the very  distal M1 segment of the left middle cerebral artery into the origin and  proximal 5 mm of the anterior-superior division M2 segment is an  intraluminal filling defect felt to be thrombus, and there is some  extension into the origin proximal aspect of the posterior-inferior  division M2 segment by several millimeters, as well and there is good  filling of the mid and distal posterior-inferior division M2 segment and  then it bifurcates into the M3 branches and there is occlusion of the  left temporoparietal M3 branch of the posterior-inferior division,  likely a chronic occlusion that occurred at the time of the old infarct:     IMPRESSION:  1. There are heavily calcified plaques involving the aortic arch and  extending into the origins of the great vessels off the aortic arch.  Heavily calcified plaque severely narrows the left subclavian artery  origin, moderately narrows the proximal left subclavian artery. There is  a heavily calcified plaque that severely narrows the left vertebral  artery origin and the proximal and mid left vertebral artery is a  severely diseased multifocally stenotic vessel with a tiny diameter  enhancing lumen and the intensity of the  enhancement in the lumen fades  as it goes superiorly in the cervical spine and there is nonenhancement  in the left vertebral lumen from C1-C2 cervical level to its proximal  intracranial segment where there is enhancement in the intracranial  segment of the left vertebral artery to the PICA origin, post PICA  segment is normal to the vertebrobasilar junction and the contrast  filling the intracranial of the left vertebral artery is likely from  retrograde flow from the basilar artery.     2. Heavily calcified plaque moderate-to-severely narrows the right  common carotid bifurcation by 70%, extends into the origin and severely  narrows the origin of the right internal carotid artery by greater than  70% using the NASCET criteria and moderate-to-severely narrows the right  external carotid origin.     3. Extensive pansinusitis with subtotal opacification of the frontal and  ethmoid maxillary sinus and complete opacification of the sphenoid  sinuses bilaterally with fluid and mucosal thickening.     4. 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 The old infarct measures 3.6 x 3.5 x 4  cm.     5. On the noncontrast head CT images there are subtle parenchymal edema  most consistent with early acute infarction involving the mid to  inferior lateral left frontal lobe, involves portion of the anterior to  mid left corona radiata region, majority of the left external capsule  and left insular cortex, measures up to 6 x 3 x 4 cm in anterior  posterior, medial lateral and craniocaudal dimension. On the CT  perfusion study of the head there is a 49 cc volume of brain parenchyma  that is ischemic and in  the center of it is a 26 cc volume of acute  completed infarction involving the mid and inferior lateral left frontal  lobe extending to the left external capsule and insular cortex,  suggesting that there is some brain that is hypoperfused but not yet  infarcted in the anterior-superior division left middle cerebral artery  territory. Furthermore on the CT angiogram images of the head there is  early M2 branch off the inferior aspect of the mid to distal M1 segment  of the left middle cerebral artery that is widely patent and there is an  acute embolus that involves a very distal aspect of the M1 segment of  the left middle cerebral artery extending the proximal 4-5 mm of the  superior and inferior division M2 segments of the left middle cerebral  artery. Furthermore, the mid and distal inferior-posterior division left  middle cerebral artery fills well with contrast but the temporoparietal  M3 branch in the inferior division left middle cerebral artery territory  does not fill with contrast and is felt to be occluded and likely  chronically occluded as this leads to the old area of infarction in the  left temporoparietal region.     Results of the final dictated exam were communicated to Dr. Solis and  Dr. Timmons by telephone on 07/06/2018 at 1:50 PM. Radiation dose  reduction techniques were utilized, including automated exposure control  and exposure modulation based on body size.     This report was finalized on 7/6/2018 4:58 PM by Dr. Jourdan Bello M.D.       CT Angiogram Neck With & Without Contrast [[id number]] Collected:  07/06/18 1539     Updated:  07/06/18 1701    Narrative:       EMERGENCY CONTRAST-ENHANCED CT ANGIOGRAM OF HEAD AND NECK AND CT  PERFUSION STUDY OF THE HEAD 07/06/2018      CLINICAL HISTORY: Acute onset confusion, right facial weakness, speech  abnormality, acute stroke.     TECHNIQUE: Initially spiral CT images were obtained from the base of the  skull to the vertex without intravenous  contrast and images were  reformatted and submitted in 3 mm thick axial CT section, 2 mm thick  sagittal and coronal reconstructions were performed and images are  submitted in brain algorithm.     FINDINGS ON NONCONTRAST HEAD CT: There is a moderate sized area of  encephalomalacia extending from the posterior superior left temporal  lobe into the lateral left parietal lobe. The area measures up to 3.6 x  3.5 x 4.5 cm in maximal medial lateral anterior posterior and  craniocaudal dimension, distribution of temporoparietal branches of the  posterior-inferior division left middle cerebral artery territory.  Furthermore, just anterior to this is a more subtle area of parenchymal  low-density that involves the anterior mid left corona radiata region  and extends into the superior left putamen, involves the left external  capsule, the entire left insular cortex and extends into the mid and  inferior lateral left frontal lobe. This is compatible with an early  acute infarct in the anterior-superior division left middle cerebral  artery territory that measures up to 6.5 x 3.3 x 4 cm. There is no  hemorrhagic transformation, no intracranial hemorrhage seen. There is  mild low-density in the frontal periventricular white matter compatible  with mild small vessel disease. Ventricles are normal in size. There is  no midline shift, no extraaxial fluid collections are identified. Once  again, no acute intracranial hemorrhage is seen. There is very extensive  pansinus opacification with near complete opacification of frontal  ethmoid maxillary and sphenoid sinuses with fluid and mucosal thickening  bilaterally. Mastoid and middle ear cavities are clear.       Impression:          1. There is a moderate sized old infarct extending throughout the  lateral left parietal lobe into the posterior superior lateral left  temporal lobe and posterior left insular cortex and this old infarct in  the posterior-inferior division left middle  cerebral artery territory  maximally measures up to 3.6 x 3.5 x 4 cm medial lateral, anterior  posterior and craniocaudal dimension, and this infarct occurred back in  June 2008, 10 years ago.     2. There is subtle parenchymal edema most consistent with an area of  early acute infarction involving the mid and inferior lateral left  frontal lobe, the anterior mid left corona radiata region, superior  lateral putamen, entire left external capsule and left insular cortex,  measures 6.5 x 3.3 x 4 cm in anterior posterior, medial lateral and  craniocaudal dimension.  This acute infarct is in the anterior-superior  division left middle cerebral artery territory and there is no  hemorrhagic transformation.     3. Extensive pansinusitis as described.     4. The remainder of the head CT is unremarkable.     The results were communicated to Dr. Timmons while the patient was still  on the table 07/06/2018 at 12:55 PM and he requested a contrast-enhanced  CT angiogram of the head and neck and CT perfusion study of the head,  and subsequently spiral CT images were obtained through the head during  the arterial phase of contrast and images were reformatted and analyzed  with rapid software analysis for CT perfusion study of the head.  Subsequently, spiral CT images were obtained from the top of the aortic  arch up through the great vessels of the head and neck during the  arterial phase of contrast and images were reformatted and submitted in  1 mm thick axial CT section, 1 mm thick sagittal and coronal  reconstructions and 3D reconstructions were performed to complete the CT  perfusion study of the head. Finally, spiral CT images were obtained  from the base of the skull to vertex delayed following intravenous  contrast and these images were reformatted and submitted in brain  algorithm, 3 mm thick axial CT sections.     CT PERFUSION STUDY: There is a moderate sized area of delayed time to  T-max of greater than 6 seconds  extending throughout the lateral left  frontal lobe into the left insular cortex. Volume of brain involved on  exam measures 49 cc and there is a smaller area of reduced cerebral  blood flow to less than 30% measuring 26 cc that involves mid and  inferior lateral left frontal lobe, as well as the left insular cortex  and external capsule, compatible with area of acute completed  infarction. The infarct appears slightly bigger on the noncontrast head  CT, but based on CT perfusion study the mismatch volume is 23 mm for the  brain.     CT ANGIOGRAM HEAD AND NECK FINDINGS: Again demonstrated is a  pansinusitis. The nasopharynx, oropharynx, hypopharynx, true cords, and  subglottic airway are normal in appearance. Thyroid gland, right lobe is  normal in appearance, left lobe is smaller, may be partially surgically  absent or just atrophic. There are emphysematous changes in upper lung  zone lung apices bilaterally. There is an irregular cluster of small  patchy nodular opacity in the posterior inferior lateral left upper  lobe, anterior to the superior aspect of the major fissure that measures  2.8 x 1.4 cm, nonspecific, could be a focal inflammatory infiltrate. The  parotid, , parapharyngeal and submandibular spaces are  symmetric and are normal in appearance. There is lower cervical  spondylosis with disc space narrowing, degenerative endplate change,  posterior endplate spurring, and uncovertebral joint hypertrophy and  mild facet overgrowth contributing to mild canal and moderate bilateral  foraminal narrowing at C5-C6, mild canal and mild left and moderate  right bony foraminal narrowing at C6-C7.     CT angiogram images through the neck demonstrate extensive calcified  plaques throughout the aortic arch, calcified plaque severely narrows  the left subclavian artery origin. There are heavily calcified plaques  moderately narrowing the proximal aspect of the left subclavian artery.  There is calcified  plaque that severely narrows the left vertebral  artery origin and there is a diseased multifocally stenotic small  diameter enhancing lumen to the left vertebral artery that loses its  enhancement in arterial phase as it extends superiorly within the  cervical spine and C1 ring level, loses all enhancement, may be occluded  or just be slow flow that does not fill during arterial phase, and then  the mid and distal intracranial segment of the left vertebral artery is  patent to the vertebrobasilar junction, possibly from a retrograde flow  the basilar artery. The left common carotid artery origin is moderately  narrowed by 50% by calcified plaques in the aortic arch, mid and distal  left common carotid artery is within normal limits. There is a  circumferential noncalcified plaque that mildly narrows the left common  carotid bifurcation, extends into the origin of the left internal  carotid artery, but there is essentially no stenosis in left internal  carotid artery using the NASCET criteria. Brachycephalic artery origins  mildly narrowed by calcified plaque, but is otherwise patent to its  bifurcation. There is calcified plaque mildly narrowing the subclavian  artery origin. The right vertebral artery origin is only mildly  narrowed. The right vertebral artery is dominant vertebral artery and is  widely patent beyond its origin to the vertebrobasilar junction without  stenosis. The right common carotid origin is normal in appearance and no  stenosis seen in the right common carotid artery to its bifurcation  where there is a heavily calcified plaque that moderately narrows the  right common carotid bifurcation, extends into the origin of the  proximal aspect of the right internal carotid artery and appears to up  to severely narrow the origin of the right internal carotid artery by  greater than 70% using the NASCET criteria. There is moderate-to-severe  stenosis of the right external carotid origin. The remainder  of the  right internal carotid artery is within normal limits.     The CT angiogram images through the head demonstrate a diseased upper  cervical left vertebral artery and no enhancement in the lumen of the  left vertebral artery from the C2 cervical level to its mid intracranial  segment where there is enhancement in the mid and distal intracranial  segment of the left vertebral artery, likely from retrograde flow from  the basilar artery to just proximal to PICA origin and the dominant  distal right vertebral artery is widely patent to the vertebrobasilar  junction. The basilar artery and basilar tip is normal in appearance.  There is a markedly hypoplastic P1 segment left posterior cerebral  artery with an area of  persistent fetal origin left posterior cerebral  artery off the back wall of the supracavernous left internal carotid  artery which is a normal anatomic variation. The posterior cerebral and  superior cerebellar arteries are normal in appearance. There are  calcified plaques mildly narrowing the cavernous segments of the  internal carotid arteries. Superior cavernous segments are widely patent  without stenosis to the carotid termini. The anterior cerebral arteries  and anterior communicating artery origin are normal in appearance. The  M1 segment of the right middle cerebral artery and right middle cerebral  artery trifurcation is normal in appearance. Extending from the very  distal M1 segment of the left middle cerebral artery into the origin and  proximal 5 mm of the anterior-superior division M2 segment is an  intraluminal filling defect felt to be thrombus, and there is some  extension into the origin proximal aspect of the posterior-inferior  division M2 segment by several millimeters, as well and there is good  filling of the mid and distal posterior-inferior division M2 segment and  then it bifurcates into the M3 branches and there is occlusion of the  left temporoparietal M3 branch of the  posterior-inferior division,  likely a chronic occlusion that occurred at the time of the old infarct:     IMPRESSION:  1. There are heavily calcified plaques involving the aortic arch and  extending into the origins of the great vessels off the aortic arch.  Heavily calcified plaque severely narrows the left subclavian artery  origin, moderately narrows the proximal left subclavian artery. There is  a heavily calcified plaque that severely narrows the left vertebral  artery origin and the proximal and mid left vertebral artery is a  severely diseased multifocally stenotic vessel with a tiny diameter  enhancing lumen and the intensity of the enhancement in the lumen fades  as it goes superiorly in the cervical spine and there is nonenhancement  in the left vertebral lumen from C1-C2 cervical level to its proximal  intracranial segment where there is enhancement in the intracranial  segment of the left vertebral artery to the PICA origin, post PICA  segment is normal to the vertebrobasilar junction and the contrast  filling the intracranial of the left vertebral artery is likely from  retrograde flow from the basilar artery.     2. Heavily calcified plaque moderate-to-severely narrows the right  common carotid bifurcation by 70%, extends into the origin and severely  narrows the origin of the right internal carotid artery by greater than  70% using the NASCET criteria and moderate-to-severely narrows the right  external carotid origin.     3. Extensive pansinusitis with subtotal opacification of the frontal and  ethmoid maxillary sinus and complete opacification of the sphenoid  sinuses bilaterally with fluid and mucosal thickening.     4. There is a moderate sized old infarct in the posterior-inferior  division left middle cerebral artery territory that occurred back in  June 2008, extends throughout the mid and inferior left parietal lobe  into the posterior superior left temporal lobe and  distribution  temporoparietal branch of the left posterior-inferior division left  middle cerebral artery territory. The old infarct measures 3.6 x 3.5 x 4  cm.     5. On the noncontrast head CT images there are subtle parenchymal edema  most consistent with early acute infarction involving the mid to  inferior lateral left frontal lobe, involves portion of the anterior to  mid left corona radiata region, majority of the left external capsule  and left insular cortex, measures up to 6 x 3 x 4 cm in anterior  posterior, medial lateral and craniocaudal dimension. On the CT  perfusion study of the head there is a 49 cc volume of brain parenchyma  that is ischemic and in the center of it is a 26 cc volume of acute  completed infarction involving the mid and inferior lateral left frontal  lobe extending to the left external capsule and insular cortex,  suggesting that there is some brain that is hypoperfused but not yet  infarcted in the anterior-superior division left middle cerebral artery  territory. Furthermore on the CT angiogram images of the head there is  early M2 branch off the inferior aspect of the mid to distal M1 segment  of the left middle cerebral artery that is widely patent and there is an  acute embolus that involves a very distal aspect of the M1 segment of  the left middle cerebral artery extending the proximal 4-5 mm of the  superior and inferior division M2 segments of the left middle cerebral  artery. Furthermore, the mid and distal inferior-posterior division left  middle cerebral artery fills well with contrast but the temporoparietal  M3 branch in the inferior division left middle cerebral artery territory  does not fill with contrast and is felt to be occluded and likely  chronically occluded as this leads to the old area of infarction in the  left temporoparietal region.     Results of the final dictated exam were communicated to Dr. Solis and  Dr. Timmons by telephone on 07/06/2018 at 1:50 PM.  Radiation dose  reduction techniques were utilized, including automated exposure control  and exposure modulation based on body size.     This report was finalized on 7/6/2018 4:58 PM by Dr. Jourdan Bello M.D.       CT Cerebral Perfusion With & Without Contrast [[id number]] Collected:  07/06/18 1539     Updated:  07/06/18 1701    Narrative:       EMERGENCY CONTRAST-ENHANCED CT ANGIOGRAM OF HEAD AND NECK AND CT  PERFUSION STUDY OF THE HEAD 07/06/2018      CLINICAL HISTORY: Acute onset confusion, right facial weakness, speech  abnormality, acute stroke.     TECHNIQUE: Initially spiral CT images were obtained from the base of the  skull to the vertex without intravenous contrast and images were  reformatted and submitted in 3 mm thick axial CT section, 2 mm thick  sagittal and coronal reconstructions were performed and images are  submitted in brain algorithm.     FINDINGS ON NONCONTRAST HEAD CT: There is a moderate sized area of  encephalomalacia extending from the posterior superior left temporal  lobe into the lateral left parietal lobe. The area measures up to 3.6 x  3.5 x 4.5 cm in maximal medial lateral anterior posterior and  craniocaudal dimension, distribution of temporoparietal branches of the  posterior-inferior division left middle cerebral artery territory.  Furthermore, just anterior to this is a more subtle area of parenchymal  low-density that involves the anterior mid left corona radiata region  and extends into the superior left putamen, involves the left external  capsule, the entire left insular cortex and extends into the mid and  inferior lateral left frontal lobe. This is compatible with an early  acute infarct in the anterior-superior division left middle cerebral  artery territory that measures up to 6.5 x 3.3 x 4 cm. There is no  hemorrhagic transformation, no intracranial hemorrhage seen. There is  mild low-density in the frontal periventricular white matter compatible  with mild small vessel  disease. Ventricles are normal in size. There is  no midline shift, no extraaxial fluid collections are identified. Once  again, no acute intracranial hemorrhage is seen. There is very extensive  pansinus opacification with near complete opacification of frontal  ethmoid maxillary and sphenoid sinuses with fluid and mucosal thickening  bilaterally. Mastoid and middle ear cavities are clear.       Impression:          1. There is a moderate sized old infarct extending throughout the  lateral left parietal lobe into the posterior superior lateral left  temporal lobe and posterior left insular cortex and this old infarct in  the posterior-inferior division left middle cerebral artery territory  maximally measures up to 3.6 x 3.5 x 4 cm medial lateral, anterior  posterior and craniocaudal dimension, and this infarct occurred back in  June 2008, 10 years ago.     2. There is subtle parenchymal edema most consistent with an area of  early acute infarction involving the mid and inferior lateral left  frontal lobe, the anterior mid left corona radiata region, superior  lateral putamen, entire left external capsule and left insular cortex,  measures 6.5 x 3.3 x 4 cm in anterior posterior, medial lateral and  craniocaudal dimension.  This acute infarct is in the anterior-superior  division left middle cerebral artery territory and there is no  hemorrhagic transformation.     3. Extensive pansinusitis as described.     4. The remainder of the head CT is unremarkable.     The results were communicated to Dr. Timmons while the patient was still  on the table 07/06/2018 at 12:55 PM and he requested a contrast-enhanced  CT angiogram of the head and neck and CT perfusion study of the head,  and subsequently spiral CT images were obtained through the head during  the arterial phase of contrast and images were reformatted and analyzed  with rapid software analysis for CT perfusion study of the head.  Subsequently, spiral CT images were  obtained from the top of the aortic  arch up through the great vessels of the head and neck during the  arterial phase of contrast and images were reformatted and submitted in  1 mm thick axial CT section, 1 mm thick sagittal and coronal  reconstructions and 3D reconstructions were performed to complete the CT  perfusion study of the head. Finally, spiral CT images were obtained  from the base of the skull to vertex delayed following intravenous  contrast and these images were reformatted and submitted in brain  algorithm, 3 mm thick axial CT sections.     CT PERFUSION STUDY: There is a moderate sized area of delayed time to  T-max of greater than 6 seconds extending throughout the lateral left  frontal lobe into the left insular cortex. Volume of brain involved on  exam measures 49 cc and there is a smaller area of reduced cerebral  blood flow to less than 30% measuring 26 cc that involves mid and  inferior lateral left frontal lobe, as well as the left insular cortex  and external capsule, compatible with area of acute completed  infarction. The infarct appears slightly bigger on the noncontrast head  CT, but based on CT perfusion study the mismatch volume is 23 mm for the  brain.     CT ANGIOGRAM HEAD AND NECK FINDINGS: Again demonstrated is a  pansinusitis. The nasopharynx, oropharynx, hypopharynx, true cords, and  subglottic airway are normal in appearance. Thyroid gland, right lobe is  normal in appearance, left lobe is smaller, may be partially surgically  absent or just atrophic. There are emphysematous changes in upper lung  zone lung apices bilaterally. There is an irregular cluster of small  patchy nodular opacity in the posterior inferior lateral left upper  lobe, anterior to the superior aspect of the major fissure that measures  2.8 x 1.4 cm, nonspecific, could be a focal inflammatory infiltrate. The  parotid, , parapharyngeal and submandibular spaces are  symmetric and are normal in  appearance. There is lower cervical  spondylosis with disc space narrowing, degenerative endplate change,  posterior endplate spurring, and uncovertebral joint hypertrophy and  mild facet overgrowth contributing to mild canal and moderate bilateral  foraminal narrowing at C5-C6, mild canal and mild left and moderate  right bony foraminal narrowing at C6-C7.     CT angiogram images through the neck demonstrate extensive calcified  plaques throughout the aortic arch, calcified plaque severely narrows  the left subclavian artery origin. There are heavily calcified plaques  moderately narrowing the proximal aspect of the left subclavian artery.  There is calcified plaque that severely narrows the left vertebral  artery origin and there is a diseased multifocally stenotic small  diameter enhancing lumen to the left vertebral artery that loses its  enhancement in arterial phase as it extends superiorly within the  cervical spine and C1 ring level, loses all enhancement, may be occluded  or just be slow flow that does not fill during arterial phase, and then  the mid and distal intracranial segment of the left vertebral artery is  patent to the vertebrobasilar junction, possibly from a retrograde flow  the basilar artery. The left common carotid artery origin is moderately  narrowed by 50% by calcified plaques in the aortic arch, mid and distal  left common carotid artery is within normal limits. There is a  circumferential noncalcified plaque that mildly narrows the left common  carotid bifurcation, extends into the origin of the left internal  carotid artery, but there is essentially no stenosis in left internal  carotid artery using the NASCET criteria. Brachycephalic artery origins  mildly narrowed by calcified plaque, but is otherwise patent to its  bifurcation. There is calcified plaque mildly narrowing the subclavian  artery origin. The right vertebral artery origin is only mildly  narrowed. The right vertebral  artery is dominant vertebral artery and is  widely patent beyond its origin to the vertebrobasilar junction without  stenosis. The right common carotid origin is normal in appearance and no  stenosis seen in the right common carotid artery to its bifurcation  where there is a heavily calcified plaque that moderately narrows the  right common carotid bifurcation, extends into the origin of the  proximal aspect of the right internal carotid artery and appears to up  to severely narrow the origin of the right internal carotid artery by  greater than 70% using the NASCET criteria. There is moderate-to-severe  stenosis of the right external carotid origin. The remainder of the  right internal carotid artery is within normal limits.     The CT angiogram images through the head demonstrate a diseased upper  cervical left vertebral artery and no enhancement in the lumen of the  left vertebral artery from the C2 cervical level to its mid intracranial  segment where there is enhancement in the mid and distal intracranial  segment of the left vertebral artery, likely from retrograde flow from  the basilar artery to just proximal to PICA origin and the dominant  distal right vertebral artery is widely patent to the vertebrobasilar  junction. The basilar artery and basilar tip is normal in appearance.  There is a markedly hypoplastic P1 segment left posterior cerebral  artery with an area of  persistent fetal origin left posterior cerebral  artery off the back wall of the supracavernous left internal carotid  artery which is a normal anatomic variation. The posterior cerebral and  superior cerebellar arteries are normal in appearance. There are  calcified plaques mildly narrowing the cavernous segments of the  internal carotid arteries. Superior cavernous segments are widely patent  without stenosis to the carotid termini. The anterior cerebral arteries  and anterior communicating artery origin are normal in appearance. The  M1  segment of the right middle cerebral artery and right middle cerebral  artery trifurcation is normal in appearance. Extending from the very  distal M1 segment of the left middle cerebral artery into the origin and  proximal 5 mm of the anterior-superior division M2 segment is an  intraluminal filling defect felt to be thrombus, and there is some  extension into the origin proximal aspect of the posterior-inferior  division M2 segment by several millimeters, as well and there is good  filling of the mid and distal posterior-inferior division M2 segment and  then it bifurcates into the M3 branches and there is occlusion of the  left temporoparietal M3 branch of the posterior-inferior division,  likely a chronic occlusion that occurred at the time of the old infarct:     IMPRESSION:  1. There are heavily calcified plaques involving the aortic arch and  extending into the origins of the great vessels off the aortic arch.  Heavily calcified plaque severely narrows the left subclavian artery  origin, moderately narrows the proximal left subclavian artery. There is  a heavily calcified plaque that severely narrows the left vertebral  artery origin and the proximal and mid left vertebral artery is a  severely diseased multifocally stenotic vessel with a tiny diameter  enhancing lumen and the intensity of the enhancement in the lumen fades  as it goes superiorly in the cervical spine and there is nonenhancement  in the left vertebral lumen from C1-C2 cervical level to its proximal  intracranial segment where there is enhancement in the intracranial  segment of the left vertebral artery to the PICA origin, post PICA  segment is normal to the vertebrobasilar junction and the contrast  filling the intracranial of the left vertebral artery is likely from  retrograde flow from the basilar artery.     2. Heavily calcified plaque moderate-to-severely narrows the right  common carotid bifurcation by 70%, extends into the origin and  severely  narrows the origin of the right internal carotid artery by greater than  70% using the NASCET criteria and moderate-to-severely narrows the right  external carotid origin.     3. Extensive pansinusitis with subtotal opacification of the frontal and  ethmoid maxillary sinus and complete opacification of the sphenoid  sinuses bilaterally with fluid and mucosal thickening.     4. 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 The old infarct measures 3.6 x 3.5 x 4  cm.     5. On the noncontrast head CT images there are subtle parenchymal edema  most consistent with early acute infarction involving the mid to  inferior lateral left frontal lobe, involves portion of the anterior to  mid left corona radiata region, majority of the left external capsule  and left insular cortex, measures up to 6 x 3 x 4 cm in anterior  posterior, medial lateral and craniocaudal dimension. On the CT  perfusion study of the head there is a 49 cc volume of brain parenchyma  that is ischemic and in the center of it is a 26 cc volume of acute  completed infarction involving the mid and inferior lateral left frontal  lobe extending to the left external capsule and insular cortex,  suggesting that there is some brain that is hypoperfused but not yet  infarcted in the anterior-superior division left middle cerebral artery  territory. Furthermore on the CT angiogram images of the head there is  early M2 branch off the inferior aspect of the mid to distal M1 segment  of the left middle cerebral artery that is widely patent and there is an  acute embolus that involves a very distal aspect of the M1 segment of  the left  middle cerebral artery extending the proximal 4-5 mm of the  superior and inferior division M2 segments of the left middle cerebral  artery. Furthermore, the mid and distal inferior-posterior division left  middle cerebral artery fills well with contrast but the temporoparietal  M3 branch in the inferior division left middle cerebral artery territory  does not fill with contrast and is felt to be occluded and likely  chronically occluded as this leads to the old area of infarction in the  left temporoparietal region.     Results of the final dictated exam were communicated to Dr. Solis and  Dr. Timmons by telephone on 07/06/2018 at 1:50 PM. Radiation dose  reduction techniques were utilized, including automated exposure control  and exposure modulation based on body size.     This report was finalized on 7/6/2018 4:58 PM by Dr. Jourdan Bello M.D.                  EKG:           BASELINE:       I personally viewed and interpreted the patient's EKG/Telemetry data.    Assessment:   Assessment/Plan   1.  CVA with occlusion of left MCA  2.  Hypertension  3.  Hyperlipidemia  4.  Peripheral arterial disease: Heavily calcified plaques in the aortic arch, origins of the great vessels, and right common carotid    - patient clinically does not look volume overloaded.  ProBNP is elevated.  Echo is pending.  No indication for diuretics at this time  -Rhythm and primary care office is not atrial fibrillation or atrial flutter.  We do not have a strong indication for anticoagulation currently.  I do agree with a 2 week Zio patch to start out with as I do think that atrial fibrillation is a possibility for her, however this has not yet been documented  -Transthoracic echocardiogram is pending.  I would not recommend transesophageal echocardiogram with patient's age    Thank you for allowing me to participate in the care of Meggan Huynh. Feel free to contact me directly with any further questions or concerns.    Flako HOUSER  MD Laura  [facility]  07/07/18  10:26 AM

## 2018-07-07 NOTE — PLAN OF CARE
Problem: Patient Care Overview  Goal: Plan of Care Review  Outcome: Ongoing (interventions implemented as appropriate)   07/07/18 1023   OTHER   Outcome Summary Patient admitted s/p CVA but currently requiring just a little help (CGA/min A) with basic mobility. Would benefit from skilled PT in acute care followed by D/C home with home health PT or famiyl support   Coping/Psychosocial   Plan of Care Reviewed With patient   Plan of Care Review   Progress improving

## 2018-07-07 NOTE — H&P
PCP: Devan Snow MD    Chief complaint   Chief Complaint   Patient presents with   • Altered Mental Status     since last night at 2000   • Unable to speak       HPI  Patient is a 88 y.o. female presents with History of hypertension and hyperlipidemia and history of stroke on Plavix who presents to the ER due to inability to speak.  Patient was in the ER 2 days ago due to elevated blood pressure and heart rate.  Supposedly her heart rate is normally in the 60s but her daughter to get and it was in the 130s and therefore they were in the ER and patient's Coreg was increased from 3.125-6.25.  There are supposed to follow-up with Dr. Salvador but it was Fourth of July and unable to get in.  They're going to see Dr. Willams today however the patient was seen normal at 8 PM the day prior to admission and her daughters called her at 11 AM and they noticed garbled speech.  The daughter ran over there and found her sitting in the chair but she was unable to communicate and was just mumbling.  Patient is not on aspirin but is on Plavix.      PAST MEDICAL HISTORY  Past Medical History:   Diagnosis Date   • Ankle pain, right    • Anxiety    • CHF (congestive heart failure) (CMS/HCC)     with preserved EF   • Chronic renal disease, stage 3, moderately decreased glomerular filtration rate between 30-59 mL/min/1.73 square meter    • COPD (chronic obstructive pulmonary disease) (CMS/HCC)    • Coronary artery disease    • Depression    • Hyperlipidemia    • Hypertension    • Hypothyroidism    • Stroke (CMS/HCC)        PAST SURGICAL HISTORY  History reviewed. No pertinent surgical history.    FAMILY HISTORY  History reviewed. No pertinent family history.    SOCIAL HISTORY  Social History   Substance Use Topics   • Smoking status: Former Smoker   • Smokeless tobacco: Not on file   • Alcohol use No       MEDICATIONS:  Prescriptions Prior to Admission   Medication Sig Dispense Refill Last Dose   • azelastine (ASTELIN) 0.1 % nasal  spray 1 spray into each nostril Daily. Mixed with Flunisolide and Ipratropium   Taking   • Calcium 500-100 MG-UNIT chewable tablet Chew 1 tablet Daily Before Lunch.      • carvedilol (COREG) 3.125 MG tablet Take 6.25 mg by mouth Every Morning. Hold for blood pressure <100      • carvedilol (COREG) 3.125 MG tablet Take 3.125 mg by mouth Every Evening.      • cetirizine (ZyrTEC) 10 MG tablet Take 10 mg by mouth daily.   Taking   • Cholecalciferol (VITAMIN D) 2000 UNITS tablet Take 2,000 Units by mouth Every Morning.   Taking   • clopidogrel (PLAVIX) 75 MG tablet Take 75 mg by mouth Daily.      • escitalopram (LEXAPRO) 10 MG tablet Take 10 mg by mouth Every Night.   Taking   • flunisolide (NASALIDE) 25 MCG/ACT (0.025%) solution nasal spray Inhale 1 spray Daily. Mix with Azelastine and Ipratropium      • fluticasone-salmeterol (ADVAIR DISKUS) 250-50 MCG/DOSE DISKUS Inhale 1 puff Daily.      • furosemide (LASIX) 20 MG tablet Take 20 mg by mouth Every Morning.      • furosemide (LASIX) 20 MG tablet Take 10 mg by mouth Every Evening.      • ipratropium (ATROVENT) 0.03 % nasal spray 1 spray into each nostril Daily. Mix with Azelastine and Flunisolide      • levothyroxine (SYNTHROID, LEVOTHROID) 50 MCG tablet Take 50 mcg by mouth Daily.      • O2 (OXYGEN) Inhale 2 L/min Every Night.      • pravastatin (PRAVACHOL) 40 MG tablet Take 40 mg by mouth Every Night.      • Umeclidinium Bromide (INCRUSE ELLIPTA) 62.5 MCG/INH aerosol powder  Inhale 1 puff Daily. 2 each 0 Taking   • zafirlukast (ACCOLATE) 20 MG tablet Take 20 mg by mouth Daily As Needed.      • Fluticasone Furoate-Vilanterol (BREO ELLIPTA) 100-25 MCG/INH aerosol powder  One inhalation daily 1 each 0 Not Taking       Allergies:  Codeine; Hydrocodone; Keflex [cephalexin]; and Levaquin [levofloxacin in d5w]    Review of Systems:  Unable to obtain due to aphasia      Vital Signs  Temp:  [97.5 °F (36.4 °C)-98 °F (36.7 °C)] 98 °F (36.7 °C)  Heart Rate:  [] 110  Resp:   "[16-18] 18  BP: (107-200)/(66-84) 107/66  Flowsheet Rows      First Filed Value   Admission Height  157.5 cm (62\") Documented at 07/06/2018 1227   Admission Weight  78.8 kg (173 lb 11.2 oz) Documented at 07/06/2018 1227           Physical Exam:  General Appearance:    Alert, cooperative with simple commands, in no acute distress, right facial droop, transmitted upper airway sounds, expressive aphasia   Head:    Normocephalic, without obvious abnormality, atraumatic   Eyes:         conjunctivae and sclerae normal, no icterus, PERRLA   ENT:    Ears grossly intact, oral mucosa moist,   Neck:   No adenopathy, supple, trachea midline,    Back:     Normal to inspection, range of motion normal   Lungs:     Exp wheeze bilat,respirations regular, even and                   unlabored    Heart:    Regular rhythm and normal rate,  no murmur, normal S1 and S2,   Abdomen:     Normal bowel sounds, no masses,  soft non-tender, non-distended,    Extremities:   Moves all extremities , no cyanosis, no edema,             Pulses:   Pulses palpable and equal bilaterally   Skin:   No bleeding, rash, _ecchymosis lef hand and right arm   Neurologic:        Psych:   Right facial droop, expressive aphasia,  sensation grossly intact, 4/5 BLE, 3.5 RUE, 4 LUE, finger nose finger more difficult for patient on right but no past pointing    Moves all extremities , equal bilateral strength     flat Affect   LABS:  Admission on 07/06/2018   Component Date Value Ref Range Status   • Glucose 07/06/2018 113* 65 - 99 mg/dL Final   • BUN 07/06/2018 26* 8 - 23 mg/dL Final   • Creatinine 07/06/2018 1.23* 0.57 - 1.00 mg/dL Final   • Sodium 07/06/2018 141  136 - 145 mmol/L Final   • Potassium 07/06/2018 3.8  3.5 - 5.2 mmol/L Final   • Chloride 07/06/2018 99  98 - 107 mmol/L Final   • CO2 07/06/2018 30.7* 22.0 - 29.0 mmol/L Final   • Calcium 07/06/2018 8.1* 8.6 - 10.5 mg/dL Final   • Total Protein 07/06/2018 7.1  6.0 - 8.5 g/dL Final   • Albumin 07/06/2018 " 4.20  3.50 - 5.20 g/dL Final   • ALT (SGPT) 07/06/2018 17  1 - 33 U/L Final   • AST (SGOT) 07/06/2018 20  1 - 32 U/L Final   • Alkaline Phosphatase 07/06/2018 94  39 - 117 U/L Final   • Total Bilirubin 07/06/2018 0.5  0.1 - 1.2 mg/dL Final   • eGFR Non  Amer 07/06/2018 41* >60 mL/min/1.73 Final   • Globulin 07/06/2018 2.9  gm/dL Final   • A/G Ratio 07/06/2018 1.4  g/dL Final   • BUN/Creatinine Ratio 07/06/2018 21.1  7.0 - 25.0 Final   • Anion Gap 07/06/2018 11.3  mmol/L Final   • Protime 07/06/2018 14.1  11.7 - 14.2 Seconds Final   • INR 07/06/2018 1.11* 0.90 - 1.10 Final   • WBC 07/06/2018 8.78  4.50 - 10.70 10*3/mm3 Final   • RBC 07/06/2018 4.31  3.90 - 5.20 10*6/mm3 Final   • Hemoglobin 07/06/2018 12.4  11.9 - 15.5 g/dL Final   • Hematocrit 07/06/2018 37.8  35.6 - 45.5 % Final   • MCV 07/06/2018 87.7  80.5 - 98.2 fL Final   • MCH 07/06/2018 28.8  26.9 - 32.0 pg Final   • MCHC 07/06/2018 32.8  32.4 - 36.3 g/dL Final   • RDW 07/06/2018 15.8* 11.7 - 13.0 % Final   • RDW-SD 07/06/2018 50.9  37.0 - 54.0 fl Final   • MPV 07/06/2018 12.2* 6.0 - 12.0 fL Final   • Platelets 07/06/2018 185  140 - 500 10*3/mm3 Final   • Neutrophil % 07/06/2018 73.1  42.7 - 76.0 % Final   • Lymphocyte % 07/06/2018 11.6* 19.6 - 45.3 % Final   • Monocyte % 07/06/2018 8.0  5.0 - 12.0 % Final   • Eosinophil % 07/06/2018 6.8* 0.3 - 6.2 % Final   • Basophil % 07/06/2018 0.5  0.0 - 1.5 % Final   • Immature Grans % 07/06/2018 0.2  0.0 - 0.5 % Final   • Neutrophils, Absolute 07/06/2018 6.42  1.90 - 8.10 10*3/mm3 Final   • Lymphocytes, Absolute 07/06/2018 1.02  0.90 - 4.80 10*3/mm3 Final   • Monocytes, Absolute 07/06/2018 0.70  0.20 - 1.20 10*3/mm3 Final   • Eosinophils, Absolute 07/06/2018 0.60  0.00 - 0.70 10*3/mm3 Final   • Basophils, Absolute 07/06/2018 0.04  0.00 - 0.20 10*3/mm3 Final   • Immature Grans, Absolute 07/06/2018 0.02  0.00 - 0.03 10*3/mm3 Final   • Glucose 07/06/2018 105  70 - 130 mg/dL Final   • P2Y12 Platelet Inhibition  07/06/2018 195  194 - 418 PRU Final   • ASA Platelet Inhibition 07/06/2018 392  ARU Final       DIAGNOSTICS:  Ct Angiogram Head/neck With & Without Contrast    Result Date: 7/6/2018   1. There is a moderate sized old infarct extending throughout the lateral left parietal lobe into the posterior superior lateral left temporal lobe and posterior left insular cortex and this old infarct in the posterior-inferior division left middle cerebral artery territory maximally measures up to 3.6 x 3.5 x 4 cm medial lateral, anterior posterior and craniocaudal dimension, and this infarct occurred back in June 2008, 10 years ago.  2. There is subtle parenchymal edema most consistent with an area of early acute infarction involving the mid and inferior lateral left frontal lobe, the anterior mid left corona radiata region, superior lateral putamen, entire left external capsule and left insular cortex, measures 6.5 x 3.3 x 4 cm in anterior posterior, medial lateral and craniocaudal dimension.  This acute infarct is in the anterior-superior division left middle cerebral artery territory and there is no hemorrhagic transformation.  3. Extensive pansinusitis as described.  4. The remainder of the head CT is unremarkable.  The results were communicated to Dr. Timmons while the patient was still on the table 07/06/2018 at 12:55 PM and he requested a contrast-enhanced CT angiogram of the head and neck and CT perfusion study of the head, and subsequently spiral CT images were obtained through the head during the arterial phase of contrast and images were reformatted and analyzed with rapid software analysis for CT perfusion study of the head. Subsequently, spiral CT images were obtained from the top of the aortic arch up through the great vessels of the head and neck during the arterial phase of contrast and images were reformatted and submitted in 1 mm thick axial CT section, 1 mm thick sagittal and coronal reconstructions and 3D  reconstructions were performed to complete the CT perfusion study of the head. Finally, spiral CT images were obtained from the base of the skull to vertex delayed following intravenous contrast and these images were reformatted and submitted in brain algorithm, 3 mm thick axial CT sections.  CT PERFUSION STUDY: There is a moderate sized area of delayed time to T-max of greater than 6 seconds extending throughout the lateral left frontal lobe into the left insular cortex. Volume of brain involved on exam measures 49 cc and there is a smaller area of reduced cerebral blood flow to less than 30% measuring 26 cc that involves mid and inferior lateral left frontal lobe, as well as the left insular cortex and external capsule, compatible with area of acute completed infarction. The infarct appears slightly bigger on the noncontrast head CT, but based on CT perfusion study the mismatch volume is 23 mm for the brain.  CT ANGIOGRAM HEAD AND NECK FINDINGS: Again demonstrated is a pansinusitis. The nasopharynx, oropharynx, hypopharynx, true cords, and subglottic airway are normal in appearance. Thyroid gland, right lobe is normal in appearance, left lobe is smaller, may be partially surgically absent or just atrophic. There are emphysematous changes in upper lung zone lung apices bilaterally. There is an irregular cluster of small patchy nodular opacity in the posterior inferior lateral left upper lobe, anterior to the superior aspect of the major fissure that measures 2.8 x 1.4 cm, nonspecific, could be a focal inflammatory infiltrate. The parotid, , parapharyngeal and submandibular spaces are symmetric and are normal in appearance. There is lower cervical spondylosis with disc space narrowing, degenerative endplate change, posterior endplate spurring, and uncovertebral joint hypertrophy and mild facet overgrowth contributing to mild canal and moderate bilateral foraminal narrowing at C5-C6, mild canal and mild  left and moderate right bony foraminal narrowing at C6-C7.  CT angiogram images through the neck demonstrate extensive calcified plaques throughout the aortic arch, calcified plaque severely narrows the left subclavian artery origin. There are heavily calcified plaques moderately narrowing the proximal aspect of the left subclavian artery. There is calcified plaque that severely narrows the left vertebral artery origin and there is a diseased multifocally stenotic small diameter enhancing lumen to the left vertebral artery that loses its enhancement in arterial phase as it extends superiorly within the cervical spine and C1 ring level, loses all enhancement, may be occluded or just be slow flow that does not fill during arterial phase, and then the mid and distal intracranial segment of the left vertebral artery is patent to the vertebrobasilar junction, possibly from a retrograde flow the basilar artery. The left common carotid artery origin is moderately narrowed by 50% by calcified plaques in the aortic arch, mid and distal left common carotid artery is within normal limits. There is a circumferential noncalcified plaque that mildly narrows the left common carotid bifurcation, extends into the origin of the left internal carotid artery, but there is essentially no stenosis in left internal carotid artery using the NASCET criteria. Brachycephalic artery origins mildly narrowed by calcified plaque, but is otherwise patent to its bifurcation. There is calcified plaque mildly narrowing the subclavian artery origin. The right vertebral artery origin is only mildly narrowed. The right vertebral artery is dominant vertebral artery and is widely patent beyond its origin to the vertebrobasilar junction without stenosis. The right common carotid origin is normal in appearance and no stenosis seen in the right common carotid artery to its bifurcation where there is a heavily calcified plaque that moderately narrows the  right common carotid bifurcation, extends into the origin of the proximal aspect of the right internal carotid artery and appears to up to severely narrow the origin of the right internal carotid artery by greater than 70% using the NASCET criteria. There is moderate-to-severe stenosis of the right external carotid origin. The remainder of the right internal carotid artery is within normal limits.  The CT angiogram images through the head demonstrate a diseased upper cervical left vertebral artery and no enhancement in the lumen of the left vertebral artery from the C2 cervical level to its mid intracranial segment where there is enhancement in the mid and distal intracranial segment of the left vertebral artery, likely from retrograde flow from the basilar artery to just proximal to PICA origin and the dominant distal right vertebral artery is widely patent to the vertebrobasilar junction. The basilar artery and basilar tip is normal in appearance. There is a markedly hypoplastic P1 segment left posterior cerebral artery with an area of  persistent fetal origin left posterior cerebral artery off the back wall of the supracavernous left internal carotid artery which is a normal anatomic variation. The posterior cerebral and superior cerebellar arteries are normal in appearance. There are calcified plaques mildly narrowing the cavernous segments of the internal carotid arteries. Superior cavernous segments are widely patent without stenosis to the carotid termini. The anterior cerebral arteries and anterior communicating artery origin are normal in appearance. The M1 segment of the right middle cerebral artery and right middle cerebral artery trifurcation is normal in appearance. Extending from the very distal M1 segment of the left middle cerebral artery into the origin and proximal 5 mm of the anterior-superior division M2 segment is an intraluminal filling defect felt to be thrombus, and there is some extension  into the origin proximal aspect of the posterior-inferior division M2 segment by several millimeters, as well and there is good filling of the mid and distal posterior-inferior division M2 segment and then it bifurcates into the M3 branches and there is occlusion of the left temporoparietal M3 branch of the posterior-inferior division, likely a chronic occlusion that occurred at the time of the old infarct:  IMPRESSION: 1. There are heavily calcified plaques involving the aortic arch and extending into the origins of the great vessels off the aortic arch. Heavily calcified plaque severely narrows the left subclavian artery origin, moderately narrows the proximal left subclavian artery. There is a heavily calcified plaque that severely narrows the left vertebral artery origin and the proximal and mid left vertebral artery is a severely diseased multifocally stenotic vessel with a tiny diameter enhancing lumen and the intensity of the enhancement in the lumen fades as it goes superiorly in the cervical spine and there is nonenhancement in the left vertebral lumen from C1-C2 cervical level to its proximal intracranial segment where there is enhancement in the intracranial segment of the left vertebral artery to the PICA origin, post PICA segment is normal to the vertebrobasilar junction and the contrast filling the intracranial of the left vertebral artery is likely from retrograde flow from the basilar artery.  2. Heavily calcified plaque moderate-to-severely narrows the right common carotid bifurcation by 70%, extends into the origin and severely narrows the origin of the right internal carotid artery by greater than 70% using the NASCET criteria and moderate-to-severely narrows the right external carotid origin.  3. Extensive pansinusitis with subtotal opacification of the frontal and ethmoid maxillary sinus and complete opacification of the sphenoid sinuses bilaterally with fluid and mucosal thickening.  4. There  is a moderate sized old infarct in the posterior-inferior division left middle cerebral artery territory that occurred back in June 2008, extends throughout the mid and inferior left parietal lobe into the posterior superior left temporal lobe and distribution temporoparietal branch of the left posterior-inferior division left middle cerebral artery territory. The old infarct measures 3.6 x 3.5 x 4 cm.  5. On the noncontrast head CT images there are subtle parenchymal edema most consistent with early acute infarction involving the mid to inferior lateral left frontal lobe, involves portion of the anterior to mid left corona radiata region, majority of the left external capsule and left insular cortex, measures up to 6 x 3 x 4 cm in anterior posterior, medial lateral and craniocaudal dimension. On the CT perfusion study of the head there is a 49 cc volume of brain parenchyma that is ischemic and in the center of it is a 26 cc volume of acute completed infarction involving the mid and inferior lateral left frontal lobe extending to the left external capsule and insular cortex, suggesting that there is some brain that is hypoperfused but not yet infarcted in the anterior-superior division left middle cerebral artery territory. Furthermore on the CT angiogram images of the head there is early M2 branch off the inferior aspect of the mid to distal M1 segment of the left middle cerebral artery that is widely patent and there is an acute embolus that involves a very distal aspect of the M1 segment of the left middle cerebral artery extending the proximal 4-5 mm of the superior and inferior division M2 segments of the left middle cerebral artery. Furthermore, the mid and distal inferior-posterior division left middle cerebral artery fills well with contrast but the temporoparietal M3 branch in the inferior division left middle cerebral artery territory does not fill with contrast and is felt to be occluded and likely  chronically occluded as this leads to the old area of infarction in the left temporoparietal region.  Results of the final dictated exam were communicated to Dr. Solis and Dr. Timmons by telephone on 07/06/2018 at 1:50 PM. Radiation dose reduction techniques were utilized, including automated exposure control and exposure modulation based on body size.  This report was finalized on 7/6/2018 4:58 PM by Dr. Jourdan Bello M.D.               Results Review:   I reviewed the patient's new clinical results.  Discussed with ER physician  I personally viewed and interpreted the patient's EKG/Telemetry data incomplete LBBB, 1 deg AV block hr 68  Old records reviewed see above    ASSESSMENT AND PLAN  +acute infarct is in the anterior-superior division left middle cerebral artery territory  And moderate sized old infarct  posterior-inferior division left middle cerebral artery territory  -pt is on plavix- will order platelet response, not on asa at home  -family states patient had episodes of 's - patient could have afib- keep on tele  -swallow eval- will likely need cortrak  -will hold bp meds and allow permissive htn for brain perfusion  -We will do the further stroke workup and treatment per order set     Aspirin 325mg/ Lipitor 80mg   Hydrate- 500 cc normal saline bolus   Neurochecks   Check Lipid panel, Hgb A1C   TTE ordered   EKG Tele   PT/OT/ST   Stroke Education       +COPD  -wheezing, and poor clearing secretions  -cough exercise, sched duobnebs, 02 at night    +tachycardia  -130's at home, was going to f/u Dr. aSlvador /melissa and coreg inc 3.125 to 6.25 four days ago.   Poss afib- keep on tele, consult cards     + Hypothyroidism- stable, continue meds, check tsh in am to see if at goal     + Hypertension  -will hold bp meds and allow permissive htn for brain perfusion     + Hyperlipidemia  stable, continue meds, check FLP in am to see if at goal  -on pravastatin 40 at home, statin increased acutely in short-term  for plaque stablization    +  Chronic renal disease, stage 3,   H/O CHF  -holding lasix for now, and coreg    +DVT proph: scd's due to high risk of hem coversion  +Medical decision maker:    I discussed the patients findings and my recommendations with the patient and/or family.  Please reference all orders placed.    Nayana Walker MD  07/06/18  9:31 PM

## 2018-07-07 NOTE — PLAN OF CARE
Problem: Patient Care Overview  Goal: Plan of Care Review  Outcome: Ongoing (interventions implemented as appropriate)   07/07/18 1023 07/07/18 1642   OTHER   Outcome Summary --  Pt still miniumal in ability to communicate. Was able to ambulate in renee with PT. Starting on fluids and puree honey thick treatment feeds. VSS will monitor   Coping/Psychosocial   Plan of Care Reviewed With patient --    Plan of Care Review   Progress improving --      Goal: Individualization and Mutuality  Outcome: Ongoing (interventions implemented as appropriate)    Goal: Discharge Needs Assessment  Outcome: Ongoing (interventions implemented as appropriate)    Goal: Interprofessional Rounds/Family Conf  Outcome: Ongoing (interventions implemented as appropriate)      Problem: Skin Injury Risk (Adult)  Goal: Identify Related Risk Factors and Signs and Symptoms  Outcome: Ongoing (interventions implemented as appropriate)    Goal: Skin Health and Integrity  Outcome: Ongoing (interventions implemented as appropriate)

## 2018-07-07 NOTE — PLAN OF CARE
Problem: Fall Risk (Adult)  Goal: Absence of Fall  Outcome: Ongoing (interventions implemented as appropriate)      Problem: Patient Care Overview  Goal: Plan of Care Review  Outcome: Ongoing (interventions implemented as appropriate)   07/07/18 0400   OTHER   Outcome Summary Pt alert, RAY orientation due to aphasia. Pt follows most commands. Up to bathroom with staff assistance with gait belt. NPO due to failed bedside swallow, speech to see this AM. MRI completed overnight. Q6H accuchecks x2 <140, accuchecks discontinued. 2L O2 at bedtime. Family at bedside. Ty continue to monitor.    Coping/Psychosocial   Plan of Care Reviewed With patient;daughter   Plan of Care Review   Progress improving       Problem: Skin Injury Risk (Adult)  Goal: Skin Health and Integrity  Outcome: Ongoing (interventions implemented as appropriate)

## 2018-07-08 NOTE — PLAN OF CARE
Problem: Patient Care Overview  Goal: Plan of Care Review  Outcome: Ongoing (interventions implemented as appropriate)   07/08/18 4078   OTHER   Outcome Summary Pt able to increase ambulation distance today to 60' with Rwx and CGA/ Uri for safety with turns/Wx negociation. Pt with decreased safety awareness requires assistance with functional mobility. Pt continues to be candidate for skilled PT services.    Coping/Psychosocial   Plan of Care Reviewed With patient   Plan of Care Review   Progress improving

## 2018-07-08 NOTE — PROGRESS NOTES
"DOS: 2018  NAME: Meggan Huynh   : 1929  PCP: Devan Snow MD  Chief Complaint   Patient presents with   • Altered Mental Status     since last night at    • Unable to speak     Stroke    Chief Complaint   Patient presents with   • Altered Mental Status     since last night at    • Unable to speak       Subjective: No significant change overnight per daughters. Pt denies h/a or pain.    Objective:  Vital signs:   Vitals:    18 2058 18 0539 18 0751 18 0917   BP: 147/67 147/64  140/72   BP Location: Left arm Left arm  Right arm   Patient Position: Lying Lying  Sitting   Pulse: 74 69 67 67   Resp: 20 20 15 20   Temp: 98.2 °F (36.8 °C) 98.1 °F (36.7 °C)  97.7 °F (36.5 °C)   TempSrc: Oral Oral  Oral   SpO2:   95% 97%   Weight:       Height:           General appearance: Well developed, well nourished, well groomed, alert and cooperative.   HEENT: Normocephalic.   Neck and spine: Normal range of motion. Normal alignment. No mass or tenderness.    Cardiac: Regular rate and rhythm. No murmurs.   Peripheral Vasculature: Extremities warm and dry.  Chest Exam: Clear to auscultation bilaterally, no wheezes, no rhonchi.  Extremities: No edema. LUE and left hand ecchymosis.  Skin: No rashes or birthmarks.     Higher integrative function: Alert. Global aphasia. Makes a few vocalizations. Some improvement as she was able to name \"Kleenex\" and \"keys.\" She stuck out her tongue on command. Perseverated on [place].\"  CNs: BTT intact bilaterally. Extraocular movements are full without nystagmus. Pupils are equal, round, and reactive to light. No relative afferent pupillary defect. No internuclear ophthalmoplegia. Normal facial sensation and strength of muscles of mastication. Decrease in R NL fold. The tongue is midline.    Motor: RUE pronator drift, otherwise moving all extremities symmetrically, at least 4-/5. No fasciculations, rigidity, spasticity or abnormal movements. "   Sensation: Normal light touch.  Station and gait: Not assessed.  Muscle stretch reflexes: Reflexes are normal and symmetric in the upper and lower extremities. Plantar reflexes are flexor bilaterally.   Coordination: Not following commands.    Pt reexamined, changes noted    Meds reviewed    aspirin 325 mg Oral Daily   Or      aspirin 300 mg Rectal Daily   atorvastatin 80 mg Oral Nightly   azelastine 1 spray Each Nare Daily   budesonide-formoterol 2 puff Inhalation BID - RT   calcium carbonate 2 tablet Oral Daily   cetirizine 5 mg Oral Daily   cholecalciferol 2,000 Units Oral QAM   clopidogrel 75 mg Oral Daily   escitalopram 10 mg Oral Nightly   fluticasone 1 spray Each Nare Daily   ipratropium 1 spray Each Nare Daily   ipratropium-albuterol 3 mL Nebulization 4x Daily - RT   levothyroxine 50 mcg Oral Daily   O2 2 L/min Inhalation Nightly       Laboratory results:  Lab Results   Component Value Date    GLUCOSE 134 (H) 07/08/2018    BUN 17 07/08/2018    CREATININE 1.09 (H) 07/08/2018    EGFRIFNONA 47 (L) 07/08/2018    EGFRIFAFRI  09/23/2016      Comment:      <15 Indicative of kidney failure.    BCR 15.6 07/08/2018    K 3.5 07/08/2018    CO2 24.8 07/08/2018    CALCIUM 8.0 (L) 07/08/2018    ALBUMIN 4.20 07/06/2018    LABIL2 1.4 07/06/2018    AST 20 07/06/2018    ALT 17 07/06/2018     Lab Results   Component Value Date    WBC 9.01 07/08/2018    HGB 10.8 (L) 07/08/2018    HCT 34.4 (L) 07/08/2018    MCV 89.6 07/08/2018     07/08/2018       Lab Results   Component Value Date    LDL 37 07/07/2018    LDL 48 10/19/2017    LDL 58 04/11/2017     Lab Results   Component Value Date    TRIG 106 07/07/2018    TRIG 145 10/19/2017    TRIG 182 (H) 04/11/2017     Lab Results   Component Value Date    HGBA1C 5.90 (H) 07/07/2018     Lab Results   Component Value Date    [id number] 485 07/11/2017     Lab Results   Component Value Date    TSH 2.980 07/03/2018     Aspirin platelet response 392 (not on asa as outpatient!?)  Plavix  platelet response 195    Review of imaging: I viewed MRI brain and agree with interpretation.  Ct Angiogram Head With & Without Contrast    Result Date: 7/6/2018   1. There is a moderate sized old infarct extending throughout the lateral left parietal lobe into the posterior superior lateral left temporal lobe and posterior left insular cortex and this old infarct in the posterior-inferior division left middle cerebral artery territory maximally measures up to 3.6 x 3.5 x 4 cm medial lateral, anterior posterior and craniocaudal dimension, and this infarct occurred back in June 2008, 10 years ago.  2. There is subtle parenchymal edema most consistent with an area of early acute infarction involving the mid and inferior lateral left frontal lobe, the anterior mid left corona radiata region, superior lateral putamen, entire left external capsule and left insular cortex, measures 6.5 x 3.3 x 4 cm in anterior posterior, medial lateral and craniocaudal dimension.  This acute infarct is in the anterior-superior division left middle cerebral artery territory and there is no hemorrhagic transformation.  3. Extensive pansinusitis as described.  4. The remainder of the head CT is unremarkable.  The results were communicated to Dr. Timmons while the patient was still on the table 07/06/2018 at 12:55 PM and he requested a contrast-enhanced CT angiogram of the head and neck and CT perfusion study of the head, and subsequently spiral CT images were obtained through the head during the arterial phase of contrast and images were reformatted and analyzed with rapid software analysis for CT perfusion study of the head. Subsequently, spiral CT images were obtained from the top of the aortic arch up through the great vessels of the head and neck during the arterial phase of contrast and images were reformatted and submitted in 1 mm thick axial CT section, 1 mm thick sagittal and coronal reconstructions and 3D reconstructions were  performed to complete the CT perfusion study of the head. Finally, spiral CT images were obtained from the base of the skull to vertex delayed following intravenous contrast and these images were reformatted and submitted in brain algorithm, 3 mm thick axial CT sections.  CT PERFUSION STUDY: There is a moderate sized area of delayed time to T-max of greater than 6 seconds extending throughout the lateral left frontal lobe into the left insular cortex. Volume of brain involved on exam measures 49 cc and there is a smaller area of reduced cerebral blood flow to less than 30% measuring 26 cc that involves mid and inferior lateral left frontal lobe, as well as the left insular cortex and external capsule, compatible with area of acute completed infarction. The infarct appears slightly bigger on the noncontrast head CT, but based on CT perfusion study the mismatch volume is 23 mm for the brain.  CT ANGIOGRAM HEAD AND NECK FINDINGS: Again demonstrated is a pansinusitis. The nasopharynx, oropharynx, hypopharynx, true cords, and subglottic airway are normal in appearance. Thyroid gland, right lobe is normal in appearance, left lobe is smaller, may be partially surgically absent or just atrophic. There are emphysematous changes in upper lung zone lung apices bilaterally. There is an irregular cluster of small patchy nodular opacity in the posterior inferior lateral left upper lobe, anterior to the superior aspect of the major fissure that measures 2.8 x 1.4 cm, nonspecific, could be a focal inflammatory infiltrate. The parotid, , parapharyngeal and submandibular spaces are symmetric and are normal in appearance. There is lower cervical spondylosis with disc space narrowing, degenerative endplate change, posterior endplate spurring, and uncovertebral joint hypertrophy and mild facet overgrowth contributing to mild canal and moderate bilateral foraminal narrowing at C5-C6, mild canal and mild left and moderate right  bony foraminal narrowing at C6-C7.  CT angiogram images through the neck demonstrate extensive calcified plaques throughout the aortic arch, calcified plaque severely narrows the left subclavian artery origin. There are heavily calcified plaques moderately narrowing the proximal aspect of the left subclavian artery. There is calcified plaque that severely narrows the left vertebral artery origin and there is a diseased multifocally stenotic small diameter enhancing lumen to the left vertebral artery that loses its enhancement in arterial phase as it extends superiorly within the cervical spine and C1 ring level, loses all enhancement, may be occluded or just be slow flow that does not fill during arterial phase, and then the mid and distal intracranial segment of the left vertebral artery is patent to the vertebrobasilar junction, possibly from a retrograde flow the basilar artery. The left common carotid artery origin is moderately narrowed by 50% by calcified plaques in the aortic arch, mid and distal left common carotid artery is within normal limits. There is a circumferential noncalcified plaque that mildly narrows the left common carotid bifurcation, extends into the origin of the left internal carotid artery, but there is essentially no stenosis in left internal carotid artery using the NASCET criteria. Brachycephalic artery origins mildly narrowed by calcified plaque, but is otherwise patent to its bifurcation. There is calcified plaque mildly narrowing the subclavian artery origin. The right vertebral artery origin is only mildly narrowed. The right vertebral artery is dominant vertebral artery and is widely patent beyond its origin to the vertebrobasilar junction without stenosis. The right common carotid origin is normal in appearance and no stenosis seen in the right common carotid artery to its bifurcation where there is a heavily calcified plaque that moderately narrows the right common carotid  bifurcation, extends into the origin of the proximal aspect of the right internal carotid artery and appears to up to severely narrow the origin of the right internal carotid artery by greater than 70% using the NASCET criteria. There is moderate-to-severe stenosis of the right external carotid origin. The remainder of the right internal carotid artery is within normal limits.  The CT angiogram images through the head demonstrate a diseased upper cervical left vertebral artery and no enhancement in the lumen of the left vertebral artery from the C2 cervical level to its mid intracranial segment where there is enhancement in the mid and distal intracranial segment of the left vertebral artery, likely from retrograde flow from the basilar artery to just proximal to PICA origin and the dominant distal right vertebral artery is widely patent to the vertebrobasilar junction. The basilar artery and basilar tip is normal in appearance. There is a markedly hypoplastic P1 segment left posterior cerebral artery with an area of  persistent fetal origin left posterior cerebral artery off the back wall of the supracavernous left internal carotid artery which is a normal anatomic variation. The posterior cerebral and superior cerebellar arteries are normal in appearance. There are calcified plaques mildly narrowing the cavernous segments of the internal carotid arteries. Superior cavernous segments are widely patent without stenosis to the carotid termini. The anterior cerebral arteries and anterior communicating artery origin are normal in appearance. The M1 segment of the right middle cerebral artery and right middle cerebral artery trifurcation is normal in appearance. Extending from the very distal M1 segment of the left middle cerebral artery into the origin and proximal 5 mm of the anterior-superior division M2 segment is an intraluminal filling defect felt to be thrombus, and there is some extension into the origin  proximal aspect of the posterior-inferior division M2 segment by several millimeters, as well and there is good filling of the mid and distal posterior-inferior division M2 segment and then it bifurcates into the M3 branches and there is occlusion of the left temporoparietal M3 branch of the posterior-inferior division, likely a chronic occlusion that occurred at the time of the old infarct:  IMPRESSION: 1. There are heavily calcified plaques involving the aortic arch and extending into the origins of the great vessels off the aortic arch. Heavily calcified plaque severely narrows the left subclavian artery origin, moderately narrows the proximal left subclavian artery. There is a heavily calcified plaque that severely narrows the left vertebral artery origin and the proximal and mid left vertebral artery is a severely diseased multifocally stenotic vessel with a tiny diameter enhancing lumen and the intensity of the enhancement in the lumen fades as it goes superiorly in the cervical spine and there is nonenhancement in the left vertebral lumen from C1-C2 cervical level to its proximal intracranial segment where there is enhancement in the intracranial segment of the left vertebral artery to the PICA origin, post PICA segment is normal to the vertebrobasilar junction and the contrast filling the intracranial of the left vertebral artery is likely from retrograde flow from the basilar artery.  2. Heavily calcified plaque moderate-to-severely narrows the right common carotid bifurcation by 70%, extends into the origin and severely narrows the origin of the right internal carotid artery by greater than 70% using the NASCET criteria and moderate-to-severely narrows the right external carotid origin.  3. Extensive pansinusitis with subtotal opacification of the frontal and ethmoid maxillary sinus and complete opacification of the sphenoid sinuses bilaterally with fluid and mucosal thickening.  4. There is a moderate  sized old infarct in the posterior-inferior division left middle cerebral artery territory that occurred back in June 2008, extends throughout the mid and inferior left parietal lobe into the posterior superior left temporal lobe and distribution temporoparietal branch of the left posterior-inferior division left middle cerebral artery territory. The old infarct measures 3.6 x 3.5 x 4 cm.  5. On the noncontrast head CT images there are subtle parenchymal edema most consistent with early acute infarction involving the mid to inferior lateral left frontal lobe, involves portion of the anterior to mid left corona radiata region, majority of the left external capsule and left insular cortex, measures up to 6 x 3 x 4 cm in anterior posterior, medial lateral and craniocaudal dimension. On the CT perfusion study of the head there is a 49 cc volume of brain parenchyma that is ischemic and in the center of it is a 26 cc volume of acute completed infarction involving the mid and inferior lateral left frontal lobe extending to the left external capsule and insular cortex, suggesting that there is some brain that is hypoperfused but not yet infarcted in the anterior-superior division left middle cerebral artery territory. Furthermore on the CT angiogram images of the head there is early M2 branch off the inferior aspect of the mid to distal M1 segment of the left middle cerebral artery that is widely patent and there is an acute embolus that involves a very distal aspect of the M1 segment of the left middle cerebral artery extending the proximal 4-5 mm of the superior and inferior division M2 segments of the left middle cerebral artery. Furthermore, the mid and distal inferior-posterior division left middle cerebral artery fills well with contrast but the temporoparietal M3 branch in the inferior division left middle cerebral artery territory does not fill with contrast and is felt to be occluded and likely chronically occluded  as this leads to the old area of infarction in the left temporoparietal region.  Results of the final dictated exam were communicated to Dr. Solis and Dr. Timmons by telephone on 07/06/2018 at 1:50 PM. Radiation dose reduction techniques were utilized, including automated exposure control and exposure modulation based on body size.  This report was finalized on 7/6/2018 4:58 PM by Dr. Jourdan Bello M.D.      Ct Angiogram Neck With & Without Contrast    Result Date: 7/6/2018   1. There is a moderate sized old infarct extending throughout the lateral left parietal lobe into the posterior superior lateral left temporal lobe and posterior left insular cortex and this old infarct in the posterior-inferior division left middle cerebral artery territory maximally measures up to 3.6 x 3.5 x 4 cm medial lateral, anterior posterior and craniocaudal dimension, and this infarct occurred back in June 2008, 10 years ago.  2. There is subtle parenchymal edema most consistent with an area of early acute infarction involving the mid and inferior lateral left frontal lobe, the anterior mid left corona radiata region, superior lateral putamen, entire left external capsule and left insular cortex, measures 6.5 x 3.3 x 4 cm in anterior posterior, medial lateral and craniocaudal dimension.  This acute infarct is in the anterior-superior division left middle cerebral artery territory and there is no hemorrhagic transformation.  3. Extensive pansinusitis as described.  4. The remainder of the head CT is unremarkable.  The results were communicated to Dr. Timmons while the patient was still on the table 07/06/2018 at 12:55 PM and he requested a contrast-enhanced CT angiogram of the head and neck and CT perfusion study of the head, and subsequently spiral CT images were obtained through the head during the arterial phase of contrast and images were reformatted and analyzed with rapid software analysis for CT perfusion study of the head.  Subsequently, spiral CT images were obtained from the top of the aortic arch up through the great vessels of the head and neck during the arterial phase of contrast and images were reformatted and submitted in 1 mm thick axial CT section, 1 mm thick sagittal and coronal reconstructions and 3D reconstructions were performed to complete the CT perfusion study of the head. Finally, spiral CT images were obtained from the base of the skull to vertex delayed following intravenous contrast and these images were reformatted and submitted in brain algorithm, 3 mm thick axial CT sections.  CT PERFUSION STUDY: There is a moderate sized area of delayed time to T-max of greater than 6 seconds extending throughout the lateral left frontal lobe into the left insular cortex. Volume of brain involved on exam measures 49 cc and there is a smaller area of reduced cerebral blood flow to less than 30% measuring 26 cc that involves mid and inferior lateral left frontal lobe, as well as the left insular cortex and external capsule, compatible with area of acute completed infarction. The infarct appears slightly bigger on the noncontrast head CT, but based on CT perfusion study the mismatch volume is 23 mm for the brain.  CT ANGIOGRAM HEAD AND NECK FINDINGS: Again demonstrated is a pansinusitis. The nasopharynx, oropharynx, hypopharynx, true cords, and subglottic airway are normal in appearance. Thyroid gland, right lobe is normal in appearance, left lobe is smaller, may be partially surgically absent or just atrophic. There are emphysematous changes in upper lung zone lung apices bilaterally. There is an irregular cluster of small patchy nodular opacity in the posterior inferior lateral left upper lobe, anterior to the superior aspect of the major fissure that measures 2.8 x 1.4 cm, nonspecific, could be a focal inflammatory infiltrate. The parotid, , parapharyngeal and submandibular spaces are symmetric and are normal in  appearance. There is lower cervical spondylosis with disc space narrowing, degenerative endplate change, posterior endplate spurring, and uncovertebral joint hypertrophy and mild facet overgrowth contributing to mild canal and moderate bilateral foraminal narrowing at C5-C6, mild canal and mild left and moderate right bony foraminal narrowing at C6-C7.  CT angiogram images through the neck demonstrate extensive calcified plaques throughout the aortic arch, calcified plaque severely narrows the left subclavian artery origin. There are heavily calcified plaques moderately narrowing the proximal aspect of the left subclavian artery. There is calcified plaque that severely narrows the left vertebral artery origin and there is a diseased multifocally stenotic small diameter enhancing lumen to the left vertebral artery that loses its enhancement in arterial phase as it extends superiorly within the cervical spine and C1 ring level, loses all enhancement, may be occluded or just be slow flow that does not fill during arterial phase, and then the mid and distal intracranial segment of the left vertebral artery is patent to the vertebrobasilar junction, possibly from a retrograde flow the basilar artery. The left common carotid artery origin is moderately narrowed by 50% by calcified plaques in the aortic arch, mid and distal left common carotid artery is within normal limits. There is a circumferential noncalcified plaque that mildly narrows the left common carotid bifurcation, extends into the origin of the left internal carotid artery, but there is essentially no stenosis in left internal carotid artery using the NASCET criteria. Brachycephalic artery origins mildly narrowed by calcified plaque, but is otherwise patent to its bifurcation. There is calcified plaque mildly narrowing the subclavian artery origin. The right vertebral artery origin is only mildly narrowed. The right vertebral artery is dominant vertebral  artery and is widely patent beyond its origin to the vertebrobasilar junction without stenosis. The right common carotid origin is normal in appearance and no stenosis seen in the right common carotid artery to its bifurcation where there is a heavily calcified plaque that moderately narrows the right common carotid bifurcation, extends into the origin of the proximal aspect of the right internal carotid artery and appears to up to severely narrow the origin of the right internal carotid artery by greater than 70% using the NASCET criteria. There is moderate-to-severe stenosis of the right external carotid origin. The remainder of the right internal carotid artery is within normal limits.  The CT angiogram images through the head demonstrate a diseased upper cervical left vertebral artery and no enhancement in the lumen of the left vertebral artery from the C2 cervical level to its mid intracranial segment where there is enhancement in the mid and distal intracranial segment of the left vertebral artery, likely from retrograde flow from the basilar artery to just proximal to PICA origin and the dominant distal right vertebral artery is widely patent to the vertebrobasilar junction. The basilar artery and basilar tip is normal in appearance. There is a markedly hypoplastic P1 segment left posterior cerebral artery with an area of  persistent fetal origin left posterior cerebral artery off the back wall of the supracavernous left internal carotid artery which is a normal anatomic variation. The posterior cerebral and superior cerebellar arteries are normal in appearance. There are calcified plaques mildly narrowing the cavernous segments of the internal carotid arteries. Superior cavernous segments are widely patent without stenosis to the carotid termini. The anterior cerebral arteries and anterior communicating artery origin are normal in appearance. The M1 segment of the right middle cerebral artery and right  middle cerebral artery trifurcation is normal in appearance. Extending from the very distal M1 segment of the left middle cerebral artery into the origin and proximal 5 mm of the anterior-superior division M2 segment is an intraluminal filling defect felt to be thrombus, and there is some extension into the origin proximal aspect of the posterior-inferior division M2 segment by several millimeters, as well and there is good filling of the mid and distal posterior-inferior division M2 segment and then it bifurcates into the M3 branches and there is occlusion of the left temporoparietal M3 branch of the posterior-inferior division, likely a chronic occlusion that occurred at the time of the old infarct:  IMPRESSION: 1. There are heavily calcified plaques involving the aortic arch and extending into the origins of the great vessels off the aortic arch. Heavily calcified plaque severely narrows the left subclavian artery origin, moderately narrows the proximal left subclavian artery. There is a heavily calcified plaque that severely narrows the left vertebral artery origin and the proximal and mid left vertebral artery is a severely diseased multifocally stenotic vessel with a tiny diameter enhancing lumen and the intensity of the enhancement in the lumen fades as it goes superiorly in the cervical spine and there is nonenhancement in the left vertebral lumen from C1-C2 cervical level to its proximal intracranial segment where there is enhancement in the intracranial segment of the left vertebral artery to the PICA origin, post PICA segment is normal to the vertebrobasilar junction and the contrast filling the intracranial of the left vertebral artery is likely from retrograde flow from the basilar artery.  2. Heavily calcified plaque moderate-to-severely narrows the right common carotid bifurcation by 70%, extends into the origin and severely narrows the origin of the right internal carotid artery by greater than 70%  using the NASCET criteria and moderate-to-severely narrows the right external carotid origin.  3. Extensive pansinusitis with subtotal opacification of the frontal and ethmoid maxillary sinus and complete opacification of the sphenoid sinuses bilaterally with fluid and mucosal thickening.  4. There is a moderate sized old infarct in the posterior-inferior division left middle cerebral artery territory that occurred back in June 2008, extends throughout the mid and inferior left parietal lobe into the posterior superior left temporal lobe and distribution temporoparietal branch of the left posterior-inferior division left middle cerebral artery territory. The old infarct measures 3.6 x 3.5 x 4 cm.  5. On the noncontrast head CT images there are subtle parenchymal edema most consistent with early acute infarction involving the mid to inferior lateral left frontal lobe, involves portion of the anterior to mid left corona radiata region, majority of the left external capsule and left insular cortex, measures up to 6 x 3 x 4 cm in anterior posterior, medial lateral and craniocaudal dimension. On the CT perfusion study of the head there is a 49 cc volume of brain parenchyma that is ischemic and in the center of it is a 26 cc volume of acute completed infarction involving the mid and inferior lateral left frontal lobe extending to the left external capsule and insular cortex, suggesting that there is some brain that is hypoperfused but not yet infarcted in the anterior-superior division left middle cerebral artery territory. Furthermore on the CT angiogram images of the head there is early M2 branch off the inferior aspect of the mid to distal M1 segment of the left middle cerebral artery that is widely patent and there is an acute embolus that involves a very distal aspect of the M1 segment of the left middle cerebral artery extending the proximal 4-5 mm of the superior and inferior division M2 segments of the left middle  cerebral artery. Furthermore, the mid and distal inferior-posterior division left middle cerebral artery fills well with contrast but the temporoparietal M3 branch in the inferior division left middle cerebral artery territory does not fill with contrast and is felt to be occluded and likely chronically occluded as this leads to the old area of infarction in the left temporoparietal region.  Results of the final dictated exam were communicated to Dr. Solis and Dr. Timmons by telephone on 07/06/2018 at 1:50 PM. Radiation dose reduction techniques were utilized, including automated exposure control and exposure modulation based on body size.  This report was finalized on 7/6/2018 4:58 PM by Dr. Jourdan Bello M.D.      Mri Brain Without Contrast    Result Date: 7/7/2018  1. Large, acute left MCA distribution infarct with mild mass effect, no acute hemorrhage appreciated. 2. Atrophy and other chronic appearing changes as discussed. 3. Extensive pansinusitis  This report was finalized on 7/7/2018 4:58 AM by Nico Diaz M.D.      Xr Chest 1 View    Result Date: 7/3/2018  No focal pulmonary consolidation. Borderline heart size. Tortuous aorta. Follow-up/further evaluation can be obtained as indicated.  This report was finalized on 7/3/2018 6:42 PM by Dr. Kane Palacios M.D.      Ct Cerebral Perfusion With & Without Contrast    Result Date: 7/6/2018   1. There is a moderate sized old infarct extending throughout the lateral left parietal lobe into the posterior superior lateral left temporal lobe and posterior left insular cortex and this old infarct in the posterior-inferior division left middle cerebral artery territory maximally measures up to 3.6 x 3.5 x 4 cm medial lateral, anterior posterior and craniocaudal dimension, and this infarct occurred back in June 2008, 10 years ago.  2. There is subtle parenchymal edema most consistent with an area of early acute infarction involving the mid and inferior lateral left  frontal lobe, the anterior mid left corona radiata region, superior lateral putamen, entire left external capsule and left insular cortex, measures 6.5 x 3.3 x 4 cm in anterior posterior, medial lateral and craniocaudal dimension.  This acute infarct is in the anterior-superior division left middle cerebral artery territory and there is no hemorrhagic transformation.  3. Extensive pansinusitis as described.  4. The remainder of the head CT is unremarkable.  The results were communicated to Dr. Timmons while the patient was still on the table 07/06/2018 at 12:55 PM and he requested a contrast-enhanced CT angiogram of the head and neck and CT perfusion study of the head, and subsequently spiral CT images were obtained through the head during the arterial phase of contrast and images were reformatted and analyzed with rapid software analysis for CT perfusion study of the head. Subsequently, spiral CT images were obtained from the top of the aortic arch up through the great vessels of the head and neck during the arterial phase of contrast and images were reformatted and submitted in 1 mm thick axial CT section, 1 mm thick sagittal and coronal reconstructions and 3D reconstructions were performed to complete the CT perfusion study of the head. Finally, spiral CT images were obtained from the base of the skull to vertex delayed following intravenous contrast and these images were reformatted and submitted in brain algorithm, 3 mm thick axial CT sections.  CT PERFUSION STUDY: There is a moderate sized area of delayed time to T-max of greater than 6 seconds extending throughout the lateral left frontal lobe into the left insular cortex. Volume of brain involved on exam measures 49 cc and there is a smaller area of reduced cerebral blood flow to less than 30% measuring 26 cc that involves mid and inferior lateral left frontal lobe, as well as the left insular cortex and external capsule, compatible with area of acute  completed infarction. The infarct appears slightly bigger on the noncontrast head CT, but based on CT perfusion study the mismatch volume is 23 mm for the brain.  CT ANGIOGRAM HEAD AND NECK FINDINGS: Again demonstrated is a pansinusitis. The nasopharynx, oropharynx, hypopharynx, true cords, and subglottic airway are normal in appearance. Thyroid gland, right lobe is normal in appearance, left lobe is smaller, may be partially surgically absent or just atrophic. There are emphysematous changes in upper lung zone lung apices bilaterally. There is an irregular cluster of small patchy nodular opacity in the posterior inferior lateral left upper lobe, anterior to the superior aspect of the major fissure that measures 2.8 x 1.4 cm, nonspecific, could be a focal inflammatory infiltrate. The parotid, , parapharyngeal and submandibular spaces are symmetric and are normal in appearance. There is lower cervical spondylosis with disc space narrowing, degenerative endplate change, posterior endplate spurring, and uncovertebral joint hypertrophy and mild facet overgrowth contributing to mild canal and moderate bilateral foraminal narrowing at C5-C6, mild canal and mild left and moderate right bony foraminal narrowing at C6-C7.  CT angiogram images through the neck demonstrate extensive calcified plaques throughout the aortic arch, calcified plaque severely narrows the left subclavian artery origin. There are heavily calcified plaques moderately narrowing the proximal aspect of the left subclavian artery. There is calcified plaque that severely narrows the left vertebral artery origin and there is a diseased multifocally stenotic small diameter enhancing lumen to the left vertebral artery that loses its enhancement in arterial phase as it extends superiorly within the cervical spine and C1 ring level, loses all enhancement, may be occluded or just be slow flow that does not fill during arterial phase, and then the mid  and distal intracranial segment of the left vertebral artery is patent to the vertebrobasilar junction, possibly from a retrograde flow the basilar artery. The left common carotid artery origin is moderately narrowed by 50% by calcified plaques in the aortic arch, mid and distal left common carotid artery is within normal limits. There is a circumferential noncalcified plaque that mildly narrows the left common carotid bifurcation, extends into the origin of the left internal carotid artery, but there is essentially no stenosis in left internal carotid artery using the NASCET criteria. Brachycephalic artery origins mildly narrowed by calcified plaque, but is otherwise patent to its bifurcation. There is calcified plaque mildly narrowing the subclavian artery origin. The right vertebral artery origin is only mildly narrowed. The right vertebral artery is dominant vertebral artery and is widely patent beyond its origin to the vertebrobasilar junction without stenosis. The right common carotid origin is normal in appearance and no stenosis seen in the right common carotid artery to its bifurcation where there is a heavily calcified plaque that moderately narrows the right common carotid bifurcation, extends into the origin of the proximal aspect of the right internal carotid artery and appears to up to severely narrow the origin of the right internal carotid artery by greater than 70% using the NASCET criteria. There is moderate-to-severe stenosis of the right external carotid origin. The remainder of the right internal carotid artery is within normal limits.  The CT angiogram images through the head demonstrate a diseased upper cervical left vertebral artery and no enhancement in the lumen of the left vertebral artery from the C2 cervical level to its mid intracranial segment where there is enhancement in the mid and distal intracranial segment of the left vertebral artery, likely from retrograde flow from the basilar  artery to just proximal to PICA origin and the dominant distal right vertebral artery is widely patent to the vertebrobasilar junction. The basilar artery and basilar tip is normal in appearance. There is a markedly hypoplastic P1 segment left posterior cerebral artery with an area of  persistent fetal origin left posterior cerebral artery off the back wall of the supracavernous left internal carotid artery which is a normal anatomic variation. The posterior cerebral and superior cerebellar arteries are normal in appearance. There are calcified plaques mildly narrowing the cavernous segments of the internal carotid arteries. Superior cavernous segments are widely patent without stenosis to the carotid termini. The anterior cerebral arteries and anterior communicating artery origin are normal in appearance. The M1 segment of the right middle cerebral artery and right middle cerebral artery trifurcation is normal in appearance. Extending from the very distal M1 segment of the left middle cerebral artery into the origin and proximal 5 mm of the anterior-superior division M2 segment is an intraluminal filling defect felt to be thrombus, and there is some extension into the origin proximal aspect of the posterior-inferior division M2 segment by several millimeters, as well and there is good filling of the mid and distal posterior-inferior division M2 segment and then it bifurcates into the M3 branches and there is occlusion of the left temporoparietal M3 branch of the posterior-inferior division, likely a chronic occlusion that occurred at the time of the old infarct:  IMPRESSION: 1. There are heavily calcified plaques involving the aortic arch and extending into the origins of the great vessels off the aortic arch. Heavily calcified plaque severely narrows the left subclavian artery origin, moderately narrows the proximal left subclavian artery. There is a heavily calcified plaque that severely narrows the left  vertebral artery origin and the proximal and mid left vertebral artery is a severely diseased multifocally stenotic vessel with a tiny diameter enhancing lumen and the intensity of the enhancement in the lumen fades as it goes superiorly in the cervical spine and there is nonenhancement in the left vertebral lumen from C1-C2 cervical level to its proximal intracranial segment where there is enhancement in the intracranial segment of the left vertebral artery to the PICA origin, post PICA segment is normal to the vertebrobasilar junction and the contrast filling the intracranial of the left vertebral artery is likely from retrograde flow from the basilar artery.  2. Heavily calcified plaque moderate-to-severely narrows the right common carotid bifurcation by 70%, extends into the origin and severely narrows the origin of the right internal carotid artery by greater than 70% using the NASCET criteria and moderate-to-severely narrows the right external carotid origin.  3. Extensive pansinusitis with subtotal opacification of the frontal and ethmoid maxillary sinus and complete opacification of the sphenoid sinuses bilaterally with fluid and mucosal thickening.  4. There is a moderate sized old infarct in the posterior-inferior division left middle cerebral artery territory that occurred back in June 2008, extends throughout the mid and inferior left parietal lobe into the posterior superior left temporal lobe and distribution temporoparietal branch of the left posterior-inferior division left middle cerebral artery territory. The old infarct measures 3.6 x 3.5 x 4 cm.  5. On the noncontrast head CT images there are subtle parenchymal edema most consistent with early acute infarction involving the mid to inferior lateral left frontal lobe, involves portion of the anterior to mid left corona radiata region, majority of the left external capsule and left insular cortex, measures up to 6 x 3 x 4 cm in anterior posterior,  medial lateral and craniocaudal dimension. On the CT perfusion study of the head there is a 49 cc volume of brain parenchyma that is ischemic and in the center of it is a 26 cc volume of acute completed infarction involving the mid and inferior lateral left frontal lobe extending to the left external capsule and insular cortex, suggesting that there is some brain that is hypoperfused but not yet infarcted in the anterior-superior division left middle cerebral artery territory. Furthermore on the CT angiogram images of the head there is early M2 branch off the inferior aspect of the mid to distal M1 segment of the left middle cerebral artery that is widely patent and there is an acute embolus that involves a very distal aspect of the M1 segment of the left middle cerebral artery extending the proximal 4-5 mm of the superior and inferior division M2 segments of the left middle cerebral artery. Furthermore, the mid and distal inferior-posterior division left middle cerebral artery fills well with contrast but the temporoparietal M3 branch in the inferior division left middle cerebral artery territory does not fill with contrast and is felt to be occluded and likely chronically occluded as this leads to the old area of infarction in the left temporoparietal region.  Results of the final dictated exam were communicated to Dr. Solis and Dr. Timmons by telephone on 07/06/2018 at 1:50 PM. Radiation dose reduction techniques were utilized, including automated exposure control and exposure modulation based on body size.  This report was finalized on 7/6/2018 4:58 PM by Dr. Jourdan Bello M.D.      EKG: NSR     7/7/18Echocardiogram Findings     Left Ventricle Calculated EF = 65%. Estimated EF was in agreement with the calculated EF. Normal left ventricular cavity size and wall thickness noted. All left ventricular wall segments contract normally. Left ventricular diastolic function is normal.      Right Ventricle Normal right  ventricular cavity size, wall thickness, systolic function and septal motion noted.      Left Atrium Left atrial cavity size is mildly dilated. Left atrial volume is mildly increased.      Right Atrium Right atrial cavity size is mild-to-moderately dilated.      Aortic Valve The aortic valve is abnormal in structure. There is calcification of the aortic valve.There is thickening of the aortic valve. Trace-to-mild aortic valve regurgitation is present. No aortic valve stenosis is present.      Mitral Valve There are myxomatous changes of the mitral valve apparatus present. Mild mitral valve regurgitation is present. No significant mitral valve stenosis is present.      Tricuspid Valve The tricuspid valve is grossly normal. No tricuspid valve stenosis is present. Moderate tricuspid valve regurgitation is present. Estimated right ventricular systolic pressure from tricuspid regurgitation is moderately elevated (45-55 mmHg).      Pulmonic Valve The pulmonic valve is grossly normal in structure.      Greater Vessels No dilation of the aortic root is present. No dilation of the sinuses of Valsalva is present.      Pericardium The pericardium is normal. There is no evidence of pericardial effusion.          Impression:  Ms Huynh is an 87 yo female with PMH stroke (on plavix), HTN, HLD, anxiety/depression, hypothyroidism, CHF, CKD, CAD, COPD, who presented with speech difficulty and right facial droop. Symptoms were present upon waking. Pt had recent admission for uncontrolled HTN.    - MRI brain- acute large left MCA stroke, chronic posterior left MCA stroke  - CTA h/n: extensive calcification and plaque in proximal vessels with left subclavian stenosis, left vertebral stenosis with likely back filling from basilar to post-PICA vert, R CCA 70% narrowing, R ICA 70% stenosis, Distal L M1 acute embolus  - CTP: shows perfusion mismatch    Dx: L MCA stroke secondary to L M1. Pt was out of the window for tPA and not felt to be a  "candidate for intervention by [facility] given her age, comorbidities, time window, location of the thrombus, amount of area at risk from perfusion studies.     Plan:  - Telemetry   - PTOT speech rehabilitation assessment  - DVT prophylaxis  - Swallow study  - Statins  - Echocardiogram showed RODERICK  - DAPT.  - Cardiac key (Holter) to monitor heart for arrhythmias at discharge (ordered)  - Cardiology does not recommend RAFAEL per note.  - Pt was living independently prior to admit. Per PT note 7/7 \"skilled PT in acute care followed by D/C home with home health PT or famiyl support\"  - D/W Daughters- given significant aphasia pt will need 24h supervision  As per family/POA patient is DNR/DNI.    D/W Dr Timmons.  "

## 2018-07-08 NOTE — PROGRESS NOTES
Echocardiogram reviewed    Normal ejection fraction.    Mild mitral valve regurgitation  Moderate tricuspid valve regurgitation  Negative saline study    -Zio patch order was placed.  Patient will need follow-up in 4 weeks with me. I will sign off.

## 2018-07-08 NOTE — PLAN OF CARE
Problem: Fall Risk (Adult)  Goal: Absence of Fall  Outcome: Ongoing (interventions implemented as appropriate)      Problem: Patient Care Overview  Goal: Discharge Needs Assessment  Outcome: Ongoing (interventions implemented as appropriate)      Problem: Skin Injury Risk (Adult)  Goal: Identify Related Risk Factors and Signs and Symptoms  Outcome: Ongoing (interventions implemented as appropriate)    Goal: Skin Health and Integrity  Outcome: Ongoing (interventions implemented as appropriate)

## 2018-07-08 NOTE — PROGRESS NOTES
Name: Meggan Huynh ADMIT: 2018   : 1929  PCP: Devan Snow MD    MRN: [id number] LOS: 2 days   AGE/SEX: 88 y.o. female  ROOM: [facility]   Subjective   Patient still aphasic but communicative with yes/no questions. She is also having difficulty writing so more complex communication not really possible today. No CP SOA NVD or abdominal pain. Daughter noticed that she was having snoring overnight. Neurology concerned about her self care and I agree with the limitations in communication. OT to see.    Objective   Vital Signs  Temp:  [97.7 °F (36.5 °C)-98.7 °F (37.1 °C)] 97.7 °F (36.5 °C)  Heart Rate:  [] 67  Resp:  [15-22] 20  BP: (129-162)/(59-72) 140/72  SpO2:  [95 %-99 %] 97 %  on  Flow (L/min):  [2] 2;   Device (Oxygen Therapy): nasal cannula  Body mass index is 31.64 kg/m².    Physical Exam   Constitutional: She appears well-developed. No distress.   HENT:   Head: Normocephalic and atraumatic.   Eyes: Conjunctivae are normal. Pupils are equal, round, and reactive to light.   Neck: Neck supple.   Cardiovascular: Normal rate, regular rhythm and intact distal pulses.    Pulmonary/Chest: Effort normal and breath sounds normal. No stridor. She has no wheezes.   Abdominal: Soft. She exhibits no distension. There is no tenderness.   Musculoskeletal: She exhibits no edema or tenderness.   Neurological: She is alert.   Strength equal bilaterally. Expressive aphasic speech. Right facial droop.   Skin: Skin is warm and dry. She is not diaphoretic.   Psychiatric: She has a normal mood and affect. Her behavior is normal.   Nursing note and vitals reviewed.      Results Review:       I reviewed the patient's new clinical results.     I reviewed imaging, agree with interpretation.      Results from last 7 days  Lab Units 18  0509 18  0618 18  1255 18  1725   WBC 10*3/mm3 9.01 9.96 8.78 9.12   HEMOGLOBIN g/dL 10.8* 11.9 12.4 13.0   PLATELETS 10*3/mm3 158 169 185 200     Results  from last 7 days  Lab Units 07/08/18  0509 07/07/18  0618 07/06/18  1255 07/03/18  1725   SODIUM mmol/L 144 141 141 143   POTASSIUM mmol/L 3.5 3.5 3.8 4.4   CHLORIDE mmol/L 107 103 99 104   CO2 mmol/L 24.8 24.3 30.7* 24.2   BUN mg/dL 17 20 26* 30*   CREATININE mg/dL 1.09* 1.02* 1.23* 1.43*   GLUCOSE mg/dL 134* 109* 113* 87   Estimated Creatinine Clearance: 34.6 mL/min (A) (by C-G formula based on SCr of 1.09 mg/dL (H)).  Results from last 7 days  Lab Units 07/08/18  0509 07/07/18 0618 07/06/18  1255 07/03/18  1725   CALCIUM mg/dL 8.0* 7.6* 8.1* 8.5*   ALBUMIN g/dL  --   --  4.20 4.00   MAGNESIUM mg/dL  --   --   --  2.1         aspirin 325 mg Oral Daily   Or      aspirin 300 mg Rectal Daily   atorvastatin 80 mg Oral Nightly   azelastine 1 spray Each Nare Daily   budesonide-formoterol 2 puff Inhalation BID - RT   calcium carbonate 2 tablet Oral Daily   cetirizine 5 mg Oral Daily   cholecalciferol 2,000 Units Oral QAM   clopidogrel 75 mg Oral Daily   escitalopram 10 mg Oral Nightly   fluticasone 1 spray Each Nare Daily   ipratropium 1 spray Each Nare Daily   ipratropium-albuterol 3 mL Nebulization 4x Daily - RT   levothyroxine 50 mcg Oral Daily   O2 2 L/min Inhalation Nightly       Pharmacy Consult    Diet Pureed; Honey Thick      Assessment/Plan      Active Hospital Problems    Diagnosis Date Noted   • Cerebrovascular accident (CVA) due to occlusion of left middle cerebral artery (CMS/HCC) [I63.512] 07/06/2018   • Hypothyroidism [E03.9] 07/06/2018   • Hypertension [I10] 07/06/2018   • Hyperlipidemia [E78.5] 07/06/2018   • Chronic renal disease, stage 3, moderately decreased glomerular filtration rate between 30-59 mL/min/1.73 square meter [N18.3] 07/06/2018   • H/O CHF [Z86.79] 07/06/2018      Resolved Hospital Problems    Diagnosis Date Noted Date Resolved   No resolved problems to display.     - CVA: Large left MCA stroke. Continue ASA/Plavix/Statin. TTE with preserved EF, no PFO, and RODERICK. Holter planned on dispo  for cardiac monitoring. No documented AFib at this point. Dofficulty with self care and communication will require 24/7 care. OT to see. CCP following. Neurology following.  - Chronic Diastolic HF: Preserved EF. Continue Coreg. Lasix held with decreased PO intake and will stop fluids today now that she is tolerating modified diet. BNP has elevated so will resume home diuretic in the morning.  - CKD3: Stable  - Dysphagia: VFSS planned for tomorrow. SLP following. Modified diet.  - Snoring: Nighttime oximetry ordered. Outpatient sleep referral recommended for risk factor modification.  - Disposition: SNF v LTC v 24-7 care, medically may be stable for discharge in few days. PT/OT following.    Meir Arriaga MD  [facility]  07/08/18  10:48 AM

## 2018-07-08 NOTE — THERAPY TREATMENT NOTE
Acute Care - Physical Therapy Treatment Note  [facility]     Patient Name: Meggan Huynh  : 1929  MRN: [id number]  Today's Date: 2018  Onset of Illness/Injury or Date of Surgery: 18  Date of Referral to PT: 18  Referring Physician: Dr. Keshia Timmons MD     Admit Date: 2018    Visit Dx:    ICD-10-CM ICD-9-CM   1. Cerebrovascular accident (CVA) due to occlusion of left middle cerebral artery (CMS/HCC) I63.512 434.91   2. Chronic systolic congestive heart failure (CMS/HCC) I50.22 428.22     428.0   3. Chronic bronchitis, unspecified chronic bronchitis type (CMS/HCC) J42 491.9   4. Oropharyngeal dysphagia R13.12 787.22   5. Generalized weakness R53.1 780.79     Patient Active Problem List   Diagnosis   • Cerebrovascular accident (CVA) due to occlusion of left middle cerebral artery (CMS/HCC)   • Hypothyroidism   • Hypertension   • Hyperlipidemia   • Chronic renal disease, stage 3, moderately decreased glomerular filtration rate between 30-59 mL/min/1.73 square meter   • H/O CHF       Therapy Treatment          Rehabilitation Treatment Summary     Row Name 18 1508             Treatment Time/Intention    Discipline physical therapist  -CN      Document Type therapy note (daily note)  -CN      Subjective Information no complaints  -CN2      Mode of Treatment individual therapy;physical therapy  -CN2      Patient/Family Observations Pt supine in bed, no acute distress. Family at bedside.   -CN2      Therapy Frequency (PT Clinical Impression) daily  -CN2      Patient Effort good  -CN2      Existing Precautions/Restrictions fall  -CN2      Recorded by [CN] Mikki Grant, PT 18 1521  [CN2] Mikki Grant, PT 18 1527      Row Name 18 1508             Cognitive Assessment/Intervention- PT/OT    Follows Commands (Cognition) follows one step commands;follows two step commands;75-90% accuracy  -CN      Safety Deficit (Cognitive) moderate deficit  -CN       Personal Safety Interventions fall prevention program maintained;gait belt;nonskid shoes/slippers when out of bed  -CN      Recorded by [CN] Mikki Grant, PT 07/08/18 1527      Row Name 07/08/18 1508             Bed Mobility Assessment/Treatment    Bed Mobility Assessment/Treatment supine-sit;sit-supine  -CN      Supine-Sit [place] (Bed Mobility) contact guard  -CN      Sit-Supine [place] (Bed Mobility) contact guard  -CN      Bed Mobility, Safety Issues cognitive deficits limit understanding  -CN      Assistive Device (Bed Mobility) bed rails  -CN      Recorded by [CN] Mikki Grant, PT 07/08/18 1527      Row Name 07/08/18 1508             Sit-Stand Transfer    Sit-Stand [place] (Transfers) minimum assist (75% patient effort)  -CN      Assistive Device (Sit-Stand Transfers) walker, front-wheeled  -CN      Recorded by [CN] Mikki Grant, PT 07/08/18 1527      Row Name 07/08/18 1508             Stand-Sit Transfer    Stand-Sit [place] (Transfers) minimum assist (75% patient effort)  -CN      Assistive Device (Stand-Sit Transfers) walker, front-wheeled  -CN      Recorded by [CN] Mikki Grant, PT 07/08/18 1527      Row Name 07/08/18 1508             Gait/Stairs Assessment/Training    Gait/Stairs Assessment/Training gait/ambulation independence  -CN      [place] Level (Gait) minimum assist (75% patient effort);contact guard  -CN      Assistive Device (Gait) walker, front-wheeled  -CN      Distance in Feet (Gait) 60  -CN      Comment (Gait/Stairs) Cueing required for safety with gait  -CN      Recorded by [CN] Mikki Grant, PT 07/08/18 1527      Row Name 07/08/18 1508             Positioning and Restraints    Pre-Treatment Position in bed  -CN      Post Treatment Position bed  -CN      In Bed notified nsg;supine;call light within reach;encouraged to call for assist;with family/caregiver  -CN      Recorded by [CN] Mikki Grant, PT  07/08/18 1527      Row Name 07/08/18 1508             Pain Scale: Numbers Pre/Post-Treatment    Pain Scale: Numbers, Pretreatment 0/10 - no pain  -CN      Pain Scale: Numbers, Post-Treatment 0/10 - no pain  -CN      Recorded by [CN] Mikki Grant, PT 07/08/18 1527        User Key  (r) = Recorded By, (t) = Taken By, (c) = Cosigned By    Initials Name Effective Dates Discipline    CN Mikki Grant, PT 04/03/18 -  PT                     Physical Therapy Education     Title: PT OT SLP Therapies (Active)     Topic: Physical Therapy (Active)     Point: Mobility training (Active)    Learning Progress Summary     Learner Status Readiness Method Response Comment Documented by    Patient Active Acceptance E NR   07/08/18 1528     Active Acceptance E,[facility] 07/07/18 1022          Point: Body mechanics (Active)    Learning Progress Summary     Learner Status Readiness Method Response Comment Documented by    Patient Active Acceptance E NR   07/08/18 1528     Active Acceptance E,[facility] 07/07/18 1022          Point: Precautions (Active)    Learning Progress Summary     Learner Status Readiness Method Response Comment Documented by    Patient Active Acceptance E NR   07/08/18 1528     Active Acceptance E,[facility] 07/07/18 1022                      User Key     Initials Effective Dates Name Provider Type Discipline     04/03/18 -  Mikki Grant, PT Physical Therapist PT     06/22/16 -  Ketty Curry, PT Physical Therapist PT                    PT Recommendation and Plan  Therapy Frequency (PT Clinical Impression): daily  Plan of Care Reviewed With: patient  Progress: improving  Outcome Summary: Pt able to increase ambulation distance today to 60' with Rwx and CGA/ Uri for safety with turns/Wx negociation. Pt with decreased safety awareness requires assistance with functional mobility. Pt continues to be candidate for skilled PT services.           Outcome Measures     Row Name 07/08/18  1500 07/07/18 1000          How much help from another person do you currently need...    Turning from your back to your side while in flat bed without using bedrails? 4  -CN 4  -KH     Moving from lying on back to sitting on the side of a flat bed without bedrails? 3  -CN 3  -KH     Moving to and from a bed to a chair (including a wheelchair)? 3  -CN 3  -KH     Standing up from a chair using your arms (e.g., wheelchair, bedside chair)? 3  -CN 3  -KH     Climbing 3-5 steps with a railing? 3  -CN 3  -KH     To walk in hospital room? 3  -CN 3  -KH     AM-PAC 6 Clicks Score 19  -CN 19  -KH        Functional Assessment    Outcome Measure Options AM-PAC 6 Clicks Basic Mobility (PT)  -CN AM-PAC 6 Clicks Basic Mobility (PT)  -KH       User Key  (r) = Recorded By, (t) = Taken By, (c) = Cosigned By    Initials Name Provider Type    ALYSSA Grant, PT Physical Therapist    PAULINE Curry, VICTOR HUGO Physical Therapist           Time Calculation:         PT Charges     Row Name 07/08/18 1530             Time Calculation    Start Time 1508  -CN      Stop Time 1520  -CN      Time Calculation (min) 12 min  -CN      PT Received On 07/08/18  -CN      PT - Next Appointment 07/09/18  -CN         Timed Charges    46236 - PT Therapeutic Exercise Minutes 12  -CN        User Key  (r) = Recorded By, (t) = Taken By, (c) = Cosigned By    Initials Name Provider Type    ALYSSA Grant, PT Physical Therapist        Therapy Suggested Charges     Code   Minutes Charges    12276 (CPT®) Hc Pt Neuromusc Re Education Ea 15 Min      18477 (CPT®) Hc Pt Ther Proc Ea 15 Min 12 1    24940 (CPT®) Hc Gait Training Ea 15 Min      76527 (CPT®) Hc Pt Therapeutic Act Ea 15 Min      76398 (CPT®) Hc Pt Manual Therapy Ea 15 Min      48299 (CPT®) Hc Pt Iontophoresis Ea 15 Min      15799 (CPT®) Hc Pt Elec Stim Ea-Per 15 Min      07038 (CPT®) Hc Pt Ultrasound Ea 15 Min      79961 (CPT®) Hc Pt Self Care/Mgmt/Train Ea 15 Min      Total  12 1         Therapy Charges for Today     Code Description Service Date Service Provider Modifiers Qty    [id number] HC PT THER PROC EA 15 MIN 7/8/2018 Mikki Grant, PT GP 1          PT G-Codes  Outcome Measure Options: AM-PAC 6 Clicks Basic Mobility (PT)    Mikki Grant, PT  7/8/2018

## 2018-07-09 NOTE — NURSING NOTE
Stroke booklet provided to patient and daughters at bedside. Discussed pages 12 and 13 specifically emphasizing all personal modifiable risk factors for stroke. Also discussed stroke medications, signs and symptoms of stroke and importance of calling 911/seeking immediate medical attention for any signs/symptoms of stroke. Patient is aphasic/nonverbal, but nods and smiles appropriately. Daughters ask questions and express understanding of education. Daughters report they have been very pleased with the care their mother has received this admission.

## 2018-07-09 NOTE — PROGRESS NOTES
Called and asked to address potential PAF. Felt by RN overnight to possibly have PAF and again this AM- ECG obtained and conts to show NSR. Tele on this floor cannot save and/or print. We will place a holter

## 2018-07-09 NOTE — PROGRESS NOTES
"DOS: 2018  NAME: Meggan Huynh   : 1929  PCP: Devan Snow MD  Chief Complaint   Patient presents with   • Altered Mental Status     since last night at    • Unable to speak     CC: Stroke    Subjective: No new events overnight per RN but she does state that the patient was documented to be in afib. I do not see any strips that were saved documenting this. She is OOB and does not currently have leads on and is on way to swallow study. She is globally aphasic. Two daughters are at bedside.    Interval History  History taken from: patient chart family RN    Objective:  Vital signs: /69 (BP Location: Right arm, Patient Position: Lying)   Pulse 74   Temp 97.9 °F (36.6 °C) (Oral)   Resp 18   Ht 157.5 cm (62\")   Wt 78.5 kg (173 lb)   SpO2 97%   BMI 31.64 kg/m²       Physical Exam:  GENERAL: NAD  HEENT: Normocephalic, atraumatic   Resp: Even and unlabored  Extremities: Multiple areas of ecchymosis of upper extremities. .    Neurological:   MS: Alert. Global aphasia (expressive > receptive). She nods appropriately at times. Can count to 8. States \"lumb\" when naming thumb. Able to pick out letter B correctly and then able to write letter. Followed most commands with mimicking.  CN: Pupils equal, reactive. No obvious VF deficit on gross testing. Flattening of left nasolabial fold.   Motor: Nearly normal strength and tone on the left, at least 4/5. RUE strength 3-4/5 with pronator drift.   Sensory: Intact to noxious stimulus.   Coordination: Not following complex commands.    Results Review:     I reviewed the patient's new clinical results.    Current Medications:  Scheduled Medications:  aspirin 325 mg Oral Daily   Or      aspirin 300 mg Rectal Daily   atorvastatin 80 mg Oral Nightly   azelastine 1 spray Each Nare Daily   budesonide-formoterol 2 puff Inhalation BID - RT   calcium carbonate 2 tablet Oral Daily   carvedilol 6.25 mg Oral BID With Meals   cetirizine 5 mg Oral Daily "   cholecalciferol 2,000 Units Oral QAM   clopidogrel 75 mg Oral Daily   escitalopram 10 mg Oral Nightly   fluticasone 1 spray Each Nare Daily   furosemide 20 mg Oral QAM   ipratropium 1 spray Each Nare Daily   ipratropium-albuterol 3 mL Nebulization 4x Daily - RT   levothyroxine 50 mcg Oral Daily   O2 2 L/min Inhalation Nightly     Infusions:   Pharmacy Consult      PRN Medications:  •  albuterol  •  bisacodyl  •  calcium carbonate  •  magnesium hydroxide  •  ondansetron **OR** ondansetron ODT **OR** ondansetron  •  Pharmacy Consult  •  sodium chloride  •  Insert peripheral IV **AND** sodium chloride    Medications Reviewed:    Laboratory results:  Lab Results   Component Value Date    GLUCOSE 141 (H) 07/09/2018    CALCIUM 8.1 (L) 07/09/2018     (H) 07/09/2018    K 3.6 07/09/2018    CO2 26.7 07/09/2018     07/09/2018    BUN 15 07/09/2018    CREATININE 0.98 07/09/2018    EGFRIFAFRI  09/23/2016      Comment:      <15 Indicative of kidney failure.    EGFRIFNONA 54 (L) 07/09/2018    BCR 15.3 07/09/2018    ANIONGAP 14.3 07/09/2018     Lab Results   Component Value Date    WBC 11.40 (H) 07/09/2018    HGB 11.0 (L) 07/09/2018    HCT 34.3 (L) 07/09/2018    MCV 88.6 07/09/2018     07/09/2018        Results from last 7 days  Lab Units 07/07/18  0618   CHOLESTEROL mg/dL 107     Lab Results   Component Value Date    INR 1.11 (H) 07/06/2018    PROTIME 14.1 07/06/2018     Lab Results   Component Value Date    TSH 2.980 07/03/2018     No components found for: LDLCALC  Lab Results   Component Value Date    HGBA1C 5.90 (H) 07/07/2018     Lab Results   Component Value Date    CHOL 107 07/07/2018    TRIG 106 07/07/2018    HDL 49 07/07/2018    LDL 37 07/07/2018     Review and interpretation of imaging:  MRI brain 7/6/18: IMPRESSION:  1. Large, acute left MCA distribution infarct with mild mass effect, no  acute hemorrhage appreciated.  2. Atrophy and other chronic appearing changes as discussed.  3. Extensive  pansinusitis  CT head 7/6/18: IMPRESSION:  1. There is a moderate sized old infarct extending throughout the  lateral left parietal lobe into the posterior superior lateral left  temporal lobe and posterior left insular cortex and this old infarct in  the posterior-inferior division left middle cerebral artery territory  maximally measures up to 3.6 x 3.5 x 4 cm medial lateral, anterior  posterior and craniocaudal dimension, and this infarct occurred back in  June 2008, 10 years ago  2. There is subtle parenchymal edema most consistent with an area of  early acute infarction involving the mid and inferior lateral left  frontal lobe, the anterior mid left corona radiata region, superior  lateral putamen, entire left external capsule and left insular cortex,  measures 6.5 x 3.3 x 4 cm in anterior posterior, medial lateral and  craniocaudal dimension.  This acute infarct is in the anterior-superior  division left middle cerebral artery territory and there is no  hemorrhagic transformation.  3. Extensive pansinusitis as described.  4. The remainder of the head CT is unremarkable  CTP 7/6/18: There is a moderate sized area of delayed time to  T-max of greater than 6 seconds extending throughout the lateral left  frontal lobe into the left insular cortex. Volume of brain involved on  exam measures 49 cc and there is a smaller area of reduced cerebral  blood flow to less than 30% measuring 26 cc that involves mid and  inferior lateral left frontal lobe, as well as the left insular cortex  and external capsule, compatible with area of acute completed  infarction. The infarct appears slightly bigger on the noncontrast head  CT, but based on CT perfusion study the mismatch volume is 23 mm for the  Brain.  CTA head/neck 7/6/18: IMPRESSION:  1. There are heavily calcified plaques involving the aortic arch and  extending into the origins of the great vessels off the aortic arch.  Heavily calcified plaque severely narrows the  left subclavian artery  origin, moderately narrows the proximal left subclavian artery. There is  a heavily calcified plaque that severely narrows the left vertebral  artery origin and the proximal and mid left vertebral artery is a  severely diseased multifocally stenotic vessel with a tiny diameter  enhancing lumen and the intensity of the enhancement in the lumen fades  as it goes superiorly in the cervical spine and there is nonenhancement  in the left vertebral lumen from C1-C2 cervical level to its proximal  intracranial segment where there is enhancement in the intracranial  segment of the left vertebral artery to the PICA origin, post PICA  segment is normal to the vertebrobasilar junction and the contrast  filling the intracranial of the left vertebral artery is likely from  retrograde flow from the basilar artery.  2. Heavily calcified plaque moderate-to-severely narrows the right  common carotid bifurcation by 70%, extends into the origin and severely  narrows the origin of the right internal carotid artery by greater than  70% using the NASCET criteria and moderate-to-severely narrows the right  external carotid origin.  3. Extensive pansinusitis with subtotal opacification of the frontal and  ethmoid maxillary sinus and complete opacification of the sphenoid  sinuses bilaterally with fluid and mucosal thickening    EKG 7/6/18: SR, 1st degree AVB  TTE 7/7/18: Interpretation Summary   · There is calcification of the aortic valve.  · Mild mitral valve regurgitation is present  · Left atrial cavity size is mildly dilated.  · Moderate tricuspid valve regurgitation is present.  · Saline bubble study neg for PFO  · There is no evidence of pericardial effusion     Impression: Ms Huynh is an 87 yo female with PMH stroke (on plavix), HTN, HLD, anxiety/depression, hypothyroidism, CHF, CKD, CAD, COPD, who presented with speech difficulty and right facial droop. Symptoms were present upon waking. Pt had recent  admission for uncontrolled HTN.  Her imaging showed a large acute left MCA stroke and chronic posterior LMCA stroke. Her vessel imaging showed extensive calcification and plaque in proximal vessels with left subclavian stenosis, left vertebral stenosis with likely back filling from basilar to post-PICA vert, R CCA 70% narrowing, R ICA 70% stenosis, Distal L M1 acute embolus and CTP showed perfusion mismatch. She was out of the window for tPA and per JADYN was not thought to be a candidate for embolectomy.    She has been evaluated by cardiology, no plans for RAFAEL due to patient's age but for ZIO patch at discharge. Per family the patient does not have a history of atrial fibrillation and prior to this event was living independently.However, per RN, there is documentation of Afib in chart but I see no EKG documentation. Will need to determine if she has PAF as this would alter management. Clinically, she continues with significant aphasia but some improvement. For now, continue ASA, Plavix and Lipitor. Continue neurochecks, PT/OT/ST and CCP for rehab placement.    Plan:  Aspirin 325mg  Plavix 75mg  Lipitor 80 mg  Hydrate  Neurochecks  Non-pharmacological DVT prophylaxis  EKG Tele  PT/OT/ST  Stroke Education  CCP for rehab placement    I have discussed the above with Dr. Timmons, RN, patient and family.  Angelia Lopez, RESHMA  07/09/18  10:39 AM      Active Problems:    Cerebrovascular accident (CVA) due to occlusion of left middle cerebral artery (CMS/HCC)    Hypothyroidism    Hypertension    Hyperlipidemia    Chronic renal disease, stage 3, moderately decreased glomerular filtration rate between 30-59 mL/min/1.73 square meter    H/O CHF

## 2018-07-09 NOTE — THERAPY TREATMENT NOTE
Acute Care - Physical Therapy Treatment Note  [facility]     Patient Name: Meggan Huynh  : 1929  MRN: [id number]  Today's Date: 2018  Onset of Illness/Injury or Date of Surgery: 18  Date of Referral to PT: 18  Referring Physician: Dr. Keshia Timmons MD     Admit Date: 2018    Visit Dx:    ICD-10-CM ICD-9-CM   1. Cerebrovascular accident (CVA) due to occlusion of left middle cerebral artery (CMS/HCC) I63.512 434.91   2. Chronic systolic congestive heart failure (CMS/HCC) I50.22 428.22     428.0   3. Chronic bronchitis, unspecified chronic bronchitis type (CMS/HCC) J42 491.9   4. Oropharyngeal dysphagia R13.12 787.22   5. Generalized weakness R53.1 780.79     Patient Active Problem List   Diagnosis   • Cerebrovascular accident (CVA) due to occlusion of left middle cerebral artery (CMS/HCC)   • Hypothyroidism   • Hypertension   • Hyperlipidemia   • Chronic renal disease, stage 3, moderately decreased glomerular filtration rate between 30-59 mL/min/1.73 square meter   • H/O CHF       Therapy Treatment          Rehabilitation Treatment Summary     Row Name 18 1042             Treatment Time/Intention    Discipline physical therapy assistant  -SM      Document Type therapy note (daily note)  -SM      Subjective Information no complaints  -SM      Mode of Treatment physical therapy  -SM      Patient Effort good  -SM      Existing Precautions/Restrictions fall  -SM      Recorded by [SM] Claritza Walker, PTA 18 1103      Row Name 18 1042             Vital Signs    Pre SpO2 (%) 96  -SM      O2 Delivery Pre Treatment supplemental O2  -SM      O2 Delivery Intra Treatment supplemental O2  -SM      O2 Delivery Post Treatment supplemental O2  -SM      Recorded by [SM] Claritza Walker, PTA 18 1103      Row Name 18 1042             Cognitive Assessment/Intervention- PT/OT    Orientation Status (Cognition) oriented to;person  -SM      Follows Commands  (Cognition) follows one step commands;over 90% accuracy  -SM      Safety Deficit (Cognitive) moderate deficit  -SM      Personal Safety Interventions fall prevention program maintained;gait belt;nonskid shoes/slippers when out of bed  -SM      Recorded by [SM] Claritza Walker, PTA 07/09/18 1103      Row Name 07/09/18 1042             Bed Mobility Assessment/Treatment    Bed Mobility Assessment/Treatment supine-sit  -SM      Supine-Sit [place] (Bed Mobility) contact guard  -SM      Assistive Device (Bed Mobility) bed rails;head of bed elevated  -SM      Recorded by [SM] Claritza Walker, PTA 07/09/18 1103      Row Name 07/09/18 1042             Transfer Assessment/Treatment    Transfer Assessment/Treatment sit-stand transfer;stand-sit transfer  -SM      Recorded by [SM] Claritza Walker, [facility] 07/09/18 1103      Row Name 07/09/18 1042             Sit-Stand Transfer    Sit-Stand [place] (Transfers) minimum assist (75% patient effort)  -      Assistive Device (Sit-Stand Transfers) walker, front-wheeled  -SM      Recorded by [SM] Claritza Walker, [facility] 07/09/18 1103      Row Name 07/09/18 1042             Stand-Sit Transfer    Stand-Sit [place] (Transfers) minimum assist (75% patient effort)  -      Assistive Device (Stand-Sit Transfers) walker, front-wheeled  -SM      Recorded by [SM] Claritza Walker, PTA 07/09/18 1103      Row Name 07/09/18 1042             Gait/Stairs Assessment/Training    Gait/Stairs Assessment/Training gait/ambulation independence;gait/ambulation assistive device;distance ambulated;gait deviations  -SM      [place] Level (Gait) contact guard;verbal cues  -      Assistive Device (Gait) walker, front-wheeled  -      Distance in Feet (Gait) 100  -SM      Deviations/Abnormal Patterns (Gait) yoav decreased  -SM      Bilateral Gait Deviations forward flexed posture  -      Comment (Gait/Stairs) cues for posture  -SM      Recorded by [SM] Claritza Walker, [facility]  07/09/18 1103      Row Name 07/09/18 1042             Positioning and Restraints    Pre-Treatment Position in bed  -      Post Treatment Position chair  -      In Chair sitting;call light within reach;encouraged to call for assist;with family/caregiver;with other staff   MD present  -SM      Recorded by [] Claritza Sanchez Walker, [facility] 07/09/18 1103      Row Name 07/09/18 1042             Pain Assessment    Additional Documentation Pain Scale: Numbers Pre/Post-Treatment (Group)  -      Recorded by [] Claritza Santiagosofía Walker, [facility] 07/09/18 1103      Row Name 07/09/18 1042             Pain Scale: Numbers Pre/Post-Treatment    Pain Scale: Numbers, Pretreatment 0/10 - no pain  -      Recorded by [] Claritza Laura Walker, [facility] 07/09/18 1103        User Key  (r) = Recorded By, (t) = Taken By, (c) = Cosigned By    Initials Name Effective Dates Discipline     Claritza Walker, [facility] 03/07/18 -  PT                     Physical Therapy Education     Title: PT OT SLP Therapies (Active)     Topic: Physical Therapy (Active)     Point: Mobility training (Active)    Learning Progress Summary     Learner Status Readiness Method Response Comment Documented by    Patient Active Acceptance E,TB NR   07/09/18 1103     Active Acceptance E NR   07/08/18 1528     Active Acceptance E, NR   07/07/18 1022          Point: Body mechanics (Active)    Learning Progress Summary     Learner Status Readiness Method Response Comment Documented by    Patient Active Acceptance E,TB NR   07/09/18 1103     Active Acceptance E NR   07/08/18 1528     Active Acceptance E, NR   07/07/18 1022          Point: Precautions (Active)    Learning Progress Summary     Learner Status Readiness Method Response Comment Documented by    Patient Active Acceptance E,TB NR   07/09/18 1103     Active Acceptance E NR   07/08/18 1528     Active Acceptance E,[facility] 07/07/18 1022                      User Key     Initials Effective Dates Name Provider  Type Discipline     03/07/18 -  Claritza Walker PTA Physical Therapy Assistant PT     04/03/18 -  Mikki Grant, PT Physical Therapist PT     06/22/16 -  Ketty Curry, PT Physical Therapist PT                    PT Recommendation and Plan     Plan of Care Reviewed With: patient  Progress: improving  Outcome Summary: Pt tolerated treatment well this date. Increased gait distance to 100ft w/ Rw and CGA. No complaints. PT will continue to address functional mobility deficits as tolerated.          Outcome Measures     Row Name 07/09/18 1100 07/08/18 1500 07/07/18 1000       How much help from another person do you currently need...    Turning from your back to your side while in flat bed without using bedrails? 4  -SM 4  -CN 4  -KH    Moving from lying on back to sitting on the side of a flat bed without bedrails? 3  -SM 3  -CN 3  -KH    Moving to and from a bed to a chair (including a wheelchair)? 3  -SM 3  -CN 3  -KH    Standing up from a chair using your arms (e.g., wheelchair, bedside chair)? 3  -SM 3  -CN 3  -KH    Climbing 3-5 steps with a railing? 3  -SM 3  -CN 3  -KH    To walk in hospital room? 3  -SM 3  -CN 3  -KH    AM-PAC 6 Clicks Score 19  -SM 19  -CN 19  -KH       Functional Assessment    Outcome Measure Options AM-PAC 6 Clicks Basic Mobility (PT)  -SM AM-PAC 6 Clicks Basic Mobility (PT)  -CN AM-PAC 6 Clicks Basic Mobility (PT)  -      User Key  (r) = Recorded By, (t) = Taken By, (c) = Cosigned By    Initials Name Provider Type     Claritza Walker, KOFFI Physical Therapy Assistant    ALYSSA Grant, PT Physical Therapist    PAULINE Curry, PT Physical Therapist           Time Calculation:         PT Charges     Row Name 07/09/18 1107             Time Calculation    Start Time 1042  -      Stop Time 1055  -      Time Calculation (min) 13 min  -      PT Received On 07/09/18  -      PT - Next Appointment 07/10/18  -        User Key  (r) = Recorded By, (t) = Taken  By, (c) = Cosigned By    Initials Name Provider Type     Claritza Walker PTA Physical Therapy Assistant        Therapy Suggested Charges     Code   Minutes Charges    90405 (CPT®) Hc Pt Neuromusc Re Education Ea 15 Min      42793 (CPT®) Hc Pt Ther Proc Ea 15 Min 12 1    70212 (CPT®) Hc Gait Training Ea 15 Min      15506 (CPT®) Hc Pt Therapeutic Act Ea 15 Min      38266 (CPT®) Hc Pt Manual Therapy Ea 15 Min      93283 (CPT®) Hc Pt Iontophoresis Ea 15 Min      02736 (CPT®) Hc Pt Elec Stim Ea-Per 15 Min      07403 (CPT®) Hc Pt Ultrasound Ea 15 Min      05408 (CPT®) Hc Pt Self Care/Mgmt/Train Ea 15 Min      Total  12 1        Therapy Charges for Today     Code Description Service Date Service Provider Modifiers Qty    59054826493 HC PT THER PROC EA 15 MIN 7/9/2018 Claritza Walker PTA GP 1          PT G-Codes  Outcome Measure Options: AM-PAC 6 Clicks Basic Mobility (PT)    Claritza Walker PTA  7/9/2018

## 2018-07-09 NOTE — PLAN OF CARE
Problem: Patient Care Overview  Goal: Plan of Care Review  Outcome: Ongoing (interventions implemented as appropriate)   07/09/18 1221   OTHER   Outcome Summary Pt participated in PT evaluation vc's for iniation with ADl tasks. decreased endurance noted. Completed BADLs with CGA. pt will benefit from skilled OT services prior to d/c.    Coping/Psychosocial   Plan of Care Reviewed With patient   Plan of Care Review   Progress improving

## 2018-07-09 NOTE — MBS/VFSS/FEES
Acute Care - Speech Language Pathology   Swallow Initial Evaluation [facility]     Patient Name: Meggan Huynh  : 1929  MRN: [id number]  Today's Date: 2018  Onset of Illness/Injury or Date of Surgery: 18     Referring Physician: Dr. Bazan      Admit Date: 2018    Visit Dx:     ICD-10-CM ICD-9-CM   1. Cerebrovascular accident (CVA) due to occlusion of left middle cerebral artery (CMS/HCC) I63.512 434.91   2. Chronic systolic congestive heart failure (CMS/HCC) I50.22 428.22     428.0   3. Chronic bronchitis, unspecified chronic bronchitis type (CMS/HCC) J42 491.9   4. Oropharyngeal dysphagia R13.12 787.22   5. Generalized weakness R53.1 780.79     Patient Active Problem List   Diagnosis   • Cerebrovascular accident (CVA) due to occlusion of left middle cerebral artery (CMS/HCC)   • Hypothyroidism   • Hypertension   • Hyperlipidemia   • Chronic renal disease, stage 3, moderately decreased glomerular filtration rate between 30-59 mL/min/1.73 square meter   • H/O CHF     Past Medical History:   Diagnosis Date   • Ankle pain, right    • Anxiety    • CHF (congestive heart failure) (CMS/HCC)     with preserved EF   • Chronic renal disease, stage 3, moderately decreased glomerular filtration rate between 30-59 mL/min/1.73 square meter    • COPD (chronic obstructive pulmonary disease) (CMS/HCC)    • Coronary artery disease    • Depression    • Hyperlipidemia    • Hypertension    • Hypothyroidism    • Stroke (CMS/HCC)      History reviewed. No pertinent surgical history.       SWALLOW EVALUATION (last 72 hours)      SLP Adult Swallow Evaluation     Row Name 18 1208 18 1100 18 0900             Rehab Evaluation    Document Type  --  -- evaluation  -LS      Patient Observations  --  -- alert;cooperative  -LS      Patient Effort  --  -- good  -LS         General Information    Patient Profile Reviewed  --  -- yes  -LS      Pertinent History Of Current Problem  --  -- --   CVA  -LS       Current Method of Nutrition  --  -- NPO  -LS      Precautions/Limitations, Vision  --  -- WFL  -LS      Precautions/Limitations, Hearing  --  -- WFL  -LS      Prior Level of Function-Communication  -- expressive language impairment  - expressive language impairment  -      Prior Level of Function-Swallowing  -- no diet consistency restrictions  - safe, efficient swallowing in all situations  -      Plans/Goals Discussed with  --  -- patient and family  -      Barriers to Rehab  -- previous functional deficit;cognitive status  - cognitive status  -      Patient's Goals for Discharge  -- patient could not state  - patient could not state  -      Family Goals for Discharge  --  -- family did not state  -         Pain Assessment    Additional Documentation  -- Pain Scale: Numbers Pre/Post-Treatment (Group)  -  --         Pain Scale: Numbers Pre/Post-Treatment    Pain Scale: Numbers, Pretreatment  -- 0/10 - no pain  -SA  --      Pain Scale: Numbers, Post-Treatment  -- 0/10 - no pain  -SA  --         Oral Motor and Function    Dentition Assessment  --  -- missing teeth  -LS      Secretion Management  --  -- WNL/WFL  -LS      Mucosal Quality  --  -- moist, healthy  -LS      Volitional Swallow  --  -- WFL  -LS         Oral Musculature and Cranial Nerve Assessment    Oral Motor General Assessment  --  -- generalized oral motor weakness  -         General Eating/Swallowing Observations    Respiratory Support Currently in Use  --  -- nasal cannula  -LS      Positioning During Eating  --  -- upright 90 degree;upright in bed  -      Utensils Used  --  -- spoon;cup  -LS         Respiratory    Respiratory Status  --  -- room air  -LS         Clinical Swallow Eval    Oral Prep Phase  --  -- WFL  -LS      Oral Residue  --  -- WFL  -LS      Pharyngeal Phase  --  -- suspected pharyngeal impairment  -      Clinical Swallow Evaluation Summary  --  -- --   pt presents with suspect phoebe pharyngeal dysphagia.   -LS         Pharyngeal Phase Concerns    Pharyngeal Phase Concerns  --  -- other (see comments)  -LS      Pharyngeal Phase Concerns, Comment  --  -- --   audible swallow with all consistencies.  -LS         MBS/VFSS    Utensils Used  -- spoon;cup;straw  -SA  --      Consistencies Trialed  -- soft textures;pureed;thin liquids;nectar/syrup-thick liquids  -SA  --         MBS/VFSS Interpretation    Oral Prep Phase  -- impaired oral phase of swallowing  -SA  --      Oral Transit Phase  -- impaired  -SA  --      Oral Residue  -- impaired  -SA  --         Oral Preparatory Phase    Oral Preparatory Phase  -- prolonged manipulation;inadequate manipulation;bolus removed from mouth manually  -SA  --      Prolonged Manipulation  -- mechanical soft  -SA  --      Inadequate Manipulation  -- mechanical soft  -SA  --      Bolus Removed from Mouth Manually  -- mechanical soft  -SA  --         Oral Transit Phase    Impaired Oral Transit Phase  -- premature spillage of liquids into pharynx  -SA  --      Premature Spillage of Liquids into Pharynx  -- mixed consistency  -SA  --         Oral Residue    Impaired Oral Residue  -- lingual residue;other (see comments)   R side tongue  -SA  --      Lingual Residue  -- mechanical soft  -SA  --      Response to Oral Residue  -- unable to clear residue;other (see comments)   removed from mouth  -SA  --         Initiation of Pharyngeal Swallow    Initiation of Pharyngeal Swallow  -- WFL;bolus in valleculae  -SA  --      Pharyngeal Phase  -- functional pharyngeal phase of swallowing  -SA  --         SLP Communication to Radiology    Summary Statement VFSS completed. No penetration or aspiration observed with thin, nectar, or puree consistencies. Soft solid with moderate oral residue which was removed from pt's mouth. REC puree diet with thin liquids  -SA  --  --         Clinical Impression    SLP Swallowing Diagnosis moderate;oral dysfunction  -SA  -- moderate;oral dysfunction;suspected pharyngeal  dysfunction  -LS      Functional Impact risk of aspiration/pneumonia  -  --  --      Rehab Potential/Prognosis, Swallowing good, to achieve stated therapy goals  -SA  -- good, to achieve stated therapy goals  -      Criteria for Skilled Therapeutic Interventions Met demonstrates skilled criteria  -SA  -- demonstrates skilled criteria  -         Recommendations    Therapy Frequency (Swallow) PRN  -SA  -- PRN  -LS      Predicted Duration Therapy Intervention (Days) until discharge  -SA  -- until discharge  -LS      SLP Diet Recommendation puree;thin liquids  -SA  -- NPO   treatment feeds of puree/honey thick liquids and a chin tuck  -      Recommended Diagnostics  --  -- VFSS (MBS)  -      Recommended Precautions and Strategies upright posture during/after eating;small bites of food and sips of liquid  -SA  -- upright posture during/after eating;small bites of food and sips of liquid;no straw;alternate between small bites of food and sips of liquid;chin Tuck  -LS      SLP Rec. for Method of Medication Administration as tolerated  -  -- meds crushed;with pudding or applesauce   must perform chin tuck.  -      Monitor for Signs of Aspiration pneumonia;right lower lobe infiltrates;cough;gurgly voice  -SA  -- yes;notify SLP if any concerns;cough;elevated WBC count;gurgly voice;throat clearing;fever  -      Anticipated Dischage Disposition anticipate therapy at next level of care  -  -- home  -         Swallow Goals (SLP)    Oral Nutrition/Hydration Goal Selection (SLP) oral nutrition/hydration, SLP goal 1  -  --  --      Lingual Strengthening Goal Selection (SLP) lingual strengthening, SLP goal 1  -  --  --      Additional Documentation lingual strengthening goal selection (SLP)  -  --  --         Oral Nutrition/Hydration Goal 1 (SLP)    Oral Nutrition/Hydration Goal 1, SLP Pt will tolerate least restrictive diet  -SA  --  --      Time Frame (Oral Nutrition/Hydration Goal 1, SLP) by discharge   -SA  --  --         Lingual Strengthening Goal 1 (SLP)    Activity (Lingual Strengthening Goal 1, SLP) increase lingual tone/sensation/control/coordination/movement  -SA  --  --        User Key  (r) = Recorded By, (t) = Taken By, (c) = Cosigned By    Initials Name Effective Dates    SA Ana [place]ale, MS CCC-SLP 03/07/18 -      Hannah Pisano, MS CCC-SLP 06/08/18 -         EDUCATION  The patient has been educated in the following areas:   Dysphagia (Swallowing Impairment) Oral Care/Hydration Modified Diet Instruction.    SLP Recommendation and Plan  SLP Swallowing Diagnosis: moderate, oral dysfunction  SLP Diet Recommendation: puree, thin liquids  Recommended Precautions and Strategies: upright posture during/after eating, small bites of food and sips of liquid     Monitor for Signs of Aspiration: pneumonia, right lower lobe infiltrates, cough, gurgly voice     Criteria for Skilled Therapeutic Interventions Met: demonstrates skilled criteria  Anticipated Dischage Disposition: anticipate therapy at next level of care  Rehab Potential/Prognosis, Swallowing: good, to achieve stated therapy goals  Therapy Frequency (Swallow): PRN  Predicted Duration Therapy Intervention (Days): until discharge       Plan of Care Reviewed With: patient  Plan of Care Review  Plan of Care Reviewed With: patient  Outcome Summary: VFSS completed.  No penetration or aspiration observed with thin, nectar, or puree consistencies.  Soft solid with moderate oral residue which was removed from pt's mouth.  REC puree diet with thin liquids.            SLP GOALS     Row Name 07/09/18 1208             Oral Nutrition/Hydration Goal 1 (SLP)    Oral Nutrition/Hydration Goal 1, SLP Pt will tolerate least restrictive diet  -SA      Time Frame (Oral Nutrition/Hydration Goal 1, SLP) by discharge  -SA         Lingual Strengthening Goal 1 (SLP)    Activity (Lingual Strengthening Goal 1, SLP) increase lingual tone/sensation/control/coordination/movement  -SA         User Key  (r) = Recorded By, (t) = Taken By, (c) = Cosigned By    Initials Name Provider Type    SA Ana Morelos MS CCC-SLP Speech and Language Pathologist             SLP Outcome Measures (last 72 hours)      SLP Outcome Measures     Row Name 07/09/18 1700 07/07/18 0900          SLP Outcome Measures    Outcome Measure Used? Adult NOMS  -SA Adult NOMS  -LS        FCM Scores    FCM Chosen  -- Swallowing  -LS     Swallowing FCM Score 4  -SA 1  -LS       User Key  (r) = Recorded By, (t) = Taken By, (c) = Cosigned By    Initials Name Effective Dates     Ana Morelos MS CCC-SLP 03/07/18 -      Hannah Pisano MS CCC-BEBE 06/08/18 -            Time Calculation:         Time Calculation- SLP     Row Name 07/09/18 1757             Time Calculation- SLP    SLP Start Time 1100  -      SLP Stop Time 1215  -      SLP Time Calculation (min) 75 min  -      SLP Received On 07/09/18  -        User Key  (r) = Recorded By, (t) = Taken By, (c) = Cosigned By    Initials Name Provider Type     Ana Morelos MS CCC-SLP Speech and Language Pathologist          Therapy Charges for Today     Code Description Service Date Service Provider Modifiers Qty    [id number] HC ST MOTION FLUORO EVAL SWALLOW 5 7/9/2018 Ana Morelos MS CCC-SLP GN 1               Ana Morelos MS CCC-BEBE  7/9/2018

## 2018-07-09 NOTE — PLAN OF CARE
Problem: Patient Care Overview  Goal: Plan of Care Review  Outcome: Ongoing (interventions implemented as appropriate)   07/09/18 1132   OTHER   Outcome Summary VFSS completed. No penetration or aspiration observed with thin, nectar, or puree consistencies. Soft solid with moderate oral residue which was removed from pt's mouth. REC puree diet with thin liquids.    Coping/Psychosocial   Plan of Care Reviewed With patient

## 2018-07-09 NOTE — PLAN OF CARE
Problem: Patient Care Overview  Goal: Plan of Care Review  Outcome: Ongoing (interventions implemented as appropriate)   07/09/18 1104   OTHER   Outcome Summary Pt tolerated treatment well this date. Increased gait distance to 100ft w/ Rw and CGA. No complaints. PT will continue to address functional mobility deficits as tolerated.   Coping/Psychosocial   Plan of Care Reviewed With patient   Plan of Care Review   Progress improving

## 2018-07-09 NOTE — PROGRESS NOTES
Name: Meggan Huynh ADMIT: 2018   : 1929  PCP: Devan Snow MD    MRN: [id number] LOS: 3 days   AGE/SEX: 88 y.o. female  ROOM: [facility]   Subjective   No CP NVD. History limited by communication difficulty. Had episode of dyspnea and wheezing this morning. Resolved with breathing treatments. Had not been getting Coreg due to NPO status and now tachycardic (sinus tach on Tele). Had nighttime oximetry study with hypoxia. Follows with Dr Sage for Pulmonology as outpatient. Hypertensive now but denies HA.    Objective   Vital Signs  Temp:  [97.7 °F (36.5 °C)-98.8 °F (37.1 °C)] 97.9 °F (36.6 °C)  Heart Rate:  [] 74  Resp:  [15-24] 18  BP: (122-189)/(69-91) 122/69  SpO2:  [5 %-98 %] 97 %  on  Flow (L/min):  [2] 2;   Device (Oxygen Therapy): nasal cannula  Body mass index is 31.64 kg/m².    Physical Exam   Constitutional: She appears well-developed. No distress.   HENT:   Head: Normocephalic and atraumatic.   Eyes: Conjunctivae are normal. Pupils are equal, round, and reactive to light.   Neck: Neck supple.   Cardiovascular: Regular rhythm and intact distal pulses.  Tachycardia present.    Pulmonary/Chest: Effort normal and breath sounds normal. No stridor. She has no wheezes.   Abdominal: Soft. She exhibits no distension. There is no tenderness.   Musculoskeletal: She exhibits no edema or tenderness.   Neurological: She is alert.   Strength equal bilaterally. Expressive aphasic speech. Right facial droop.   Skin: Skin is warm and dry. She is not diaphoretic.   Psychiatric: She has a normal mood and affect. Her behavior is normal.   Nursing note and vitals reviewed.      Results Review:       I reviewed the patient's new clinical results.     I reviewed telemetry/EKG results, sinus tachycardia     I reviewed other test results, agree with interpretation.      Results from last 7 days  Lab Units 18  0547 18  0509 18  0618 18  1255   WBC 10*3/mm3 11.40* 9.01 9.96 8.78    HEMOGLOBIN g/dL 11.0* 10.8* 11.9 12.4   PLATELETS 10*3/mm3 182 158 169 185     Results from last 7 days  Lab Units 07/09/18  0742 07/08/18  0509 07/07/18 0618 07/06/18  1255   SODIUM mmol/L 148* 144 141 141   POTASSIUM mmol/L 3.6 3.5 3.5 3.8   CHLORIDE mmol/L 107 107 103 99   CO2 mmol/L 26.7 24.8 24.3 30.7*   BUN mg/dL 15 17 20 26*   CREATININE mg/dL 0.98 1.09* 1.02* 1.23*   GLUCOSE mg/dL 141* 134* 109* 113*   Estimated Creatinine Clearance: 38.5 mL/min (by C-G formula based on SCr of 0.98 mg/dL).  Results from last 7 days  Lab Units 07/09/18  0742 07/08/18  0509 07/07/18 0618 07/06/18  1255 07/03/18  1725   CALCIUM mg/dL 8.1* 8.0* 7.6* 8.1* 8.5*   ALBUMIN g/dL  --   --   --  4.20 4.00   MAGNESIUM mg/dL  --   --   --   --  2.1         aspirin 325 mg Oral Daily   Or      aspirin 300 mg Rectal Daily   atorvastatin 80 mg Oral Nightly   azelastine 1 spray Each Nare Daily   budesonide-formoterol 2 puff Inhalation BID - RT   calcium carbonate 2 tablet Oral Daily   carvedilol 6.25 mg Oral BID With Meals   cetirizine 5 mg Oral Daily   cholecalciferol 2,000 Units Oral QAM   clopidogrel 75 mg Oral Daily   escitalopram 10 mg Oral Nightly   fluticasone 1 spray Each Nare Daily   furosemide 20 mg Oral QAM   ipratropium 1 spray Each Nare Daily   ipratropium-albuterol 3 mL Nebulization 4x Daily - RT   levothyroxine 50 mcg Oral Daily   O2 2 L/min Inhalation Nightly       Pharmacy Consult    Diet Pureed; Honey Thick      Assessment/Plan      Active Hospital Problems    Diagnosis Date Noted   • Cerebrovascular accident (CVA) due to occlusion of left middle cerebral artery (CMS/HCC) [I63.512] 07/06/2018   • Hypothyroidism [E03.9] 07/06/2018   • Hypertension [I10] 07/06/2018   • Hyperlipidemia [E78.5] 07/06/2018   • Chronic renal disease, stage 3, moderately decreased glomerular filtration rate between 30-59 mL/min/1.73 square meter [N18.3] 07/06/2018   • H/O CHF [Z86.79] 07/06/2018      Resolved Hospital Problems    Diagnosis Date  Noted Date Resolved   No resolved problems to display.     - CVA: Large left MCA stroke. Continue ASA/Plavix/Statin. TTE with preserved EF, no PFO, and RODERICK. Holter planned on dispo for cardiac monitoring. No documented AFib at this point. Difficulty with self care and communication will require 24/7 care. OT to see. CCP following. Neurology following.  - Chronic Diastolic HF: Preserved EF. Off fluids and lasix resumed. Resume coreg.  - CKD3: Stable  - Dysphagia: VFSS planned for today. SLP following. Modified diet.  - Nighttime Hypoxia: PRATIMA likely. Will consult sleep medicine. Nightime O2 as needed.  - Tachycardia: From albuterol treatments and withdrawal of coreg. Resume coreg and monitor.  - HTN: BP elevated. Monitor on resumed coreg and lasix.  - COPD: Wheezing resolved after albuterol. No rales on exam. Symbicort, Duonebs. Albuterol PRN.  - Disposition: SNF v LTC v 24-7 care, 1-2 days.    Called by Nursing. She had noted AFib overnight on telemetry. Has now returned to sinus rhythm. Will ask Cardiology to follow up today about AC.    Meir Arriaga MD  [facility]  07/09/18  10:39 AM

## 2018-07-09 NOTE — DISCHARGE PLACEMENT REQUEST
"Meggan Huynh T (88 y.o. Female)     Date of Birth Social Security Number Address Home Phone MRN    08/26/1929  [id number]  [facility] [address] [phone] [id number]    Rastafarian Marital Status          None Single       Admission Date Admission Type Admitting Provider Attending Provider Department, Room/Bed    7/6/18 Emergency Nayana Walker MD Baumann, Patrick D, MD [address], P592/1    Discharge Date Discharge Disposition Discharge Destination                       Attending Provider:  Meir Arriaga MD    Allergies:  Codeine, Hydrocodone, Keflex [Cephalexin], Levaquin [Levofloxacin In D5w]    Isolation:  None   Infection:  None   Code Status:  No CPR    Ht:  157.5 cm (62\")   Wt:  78.5 kg (173 lb)    Admission Cmt:  None   Principal Problem:  None                Active Insurance as of 7/6/2018     Primary Coverage     Payor Plan Insurance Group Employer/Plan Group    MEDICARE MEDICARE A & B      Payor Plan Address Payor Plan Phone Number Effective From Effective To    [address] [phone] 8/1/1994     [facility] [address]       Subscriber Name Subscriber Birth Date Member ID       MEGGAN HUYNH T 8/26/1929 [id number]           Secondary Coverage     Payor Plan Insurance Group Employer/Plan Group    [facility] SUPP [id number]     Payor Plan Address Payor Plan Phone Number Effective From Effective To    [address]  12/1/2016     [facility] [address]       Subscriber Name Subscriber Birth Date Member ID       MEGGAN HUYNH T 8/26/1929 [id number]                 Emergency Contacts      (Rel.) Home Phone Work Phone Mobile Phone    OtonielRani alvarado (Daughter) [phone] -- --    Bing Paredes (Daughter) [phone] -- --    AminaPriyanka (Daughter) [phone] -- --              "

## 2018-07-09 NOTE — THERAPY EVALUATION
Acute Care - Occupational Therapy Initial Evaluation  [facility]     Patient Name: Meggan Huynh  : 1929  MRN: [id number]  Today's Date: 2018  Onset of Illness/Injury or Date of Surgery: 18  Date of Referral to OT: 18  Referring Physician: Dr. Bazan    Admit Date: 2018       ICD-10-CM ICD-9-CM   1. Cerebrovascular accident (CVA) due to occlusion of left middle cerebral artery (CMS/HCC) I63.512 434.91   2. Chronic systolic congestive heart failure (CMS/HCC) I50.22 428.22     428.0   3. Chronic bronchitis, unspecified chronic bronchitis type (CMS/HCC) J42 491.9   4. Oropharyngeal dysphagia R13.12 787.22   5. Generalized weakness R53.1 780.79     Patient Active Problem List   Diagnosis   • Cerebrovascular accident (CVA) due to occlusion of left middle cerebral artery (CMS/HCC)   • Hypothyroidism   • Hypertension   • Hyperlipidemia   • Chronic renal disease, stage 3, moderately decreased glomerular filtration rate between 30-59 mL/min/1.73 square meter   • H/O CHF     Past Medical History:   Diagnosis Date   • Ankle pain, right    • Anxiety    • CHF (congestive heart failure) (CMS/HCC)     with preserved EF   • Chronic renal disease, stage 3, moderately decreased glomerular filtration rate between 30-59 mL/min/1.73 square meter    • COPD (chronic obstructive pulmonary disease) (CMS/HCC)    • Coronary artery disease    • Depression    • Hyperlipidemia    • Hypertension    • Hypothyroidism    • Stroke (CMS/HCC)      History reviewed. No pertinent surgical history.       OT ASSESSMENT FLOWSHEET (last 72 hours)      Occupational Therapy Evaluation     Row Name 18 1208                   OT Evaluation Time/Intention    Subjective Information no complaints  -AF        Document Type therapy note (daily note)  -AF        Mode of Treatment occupational therapy  -AF        Patient Effort good  -AF        Symptoms Noted During/After Treatment shortness of breath  -AF        Comment daughter  present during evaluation  -AF           General Information    Patient Profile Reviewed? yes  -AF        Onset of Illness/Injury or Date of Surgery 07/06/18  -AF        Referring Physician Dr. Bazan  -AF        Patient Observations alert;cooperative  -AF        Patient/Family Observations sitting up in bed, O2, no acute distress, daughters present  -AF        Prior Level of Function independent:;ADL's  -AF        Existing Precautions/Restrictions fall  -AF           Cognitive Assessment/Intervention- PT/OT    Orientation Status (Cognition) oriented to;person  -AF        Follows Commands (Cognition) over 90% accuracy;follows one step commands;delayed response/completion;increased processing time needed;verbal cues/prompting required  -AF        Safety Deficit (Cognitive) moderate deficit  -AF        Personal Safety Interventions fall prevention program maintained;gait belt;nonskid shoes/slippers when out of bed  -AF        Cognitive Assessment/Intervention Comment decreased processing noted with ADL tasks. pt able to verbalize partial of her daughters names with increased time and decreased inteligibiilty  -AF           Safety Issues, Functional Mobility    Comment, Safety Issues/Impairments (Mobility) hand held assist pt walked to and from bathroom and EOB  -AF           Bed Mobility Assessment/Treatment    Supine-Sit [place] (Bed Mobility) contact guard;verbal cues  -AF        Sit-Supine [place] (Bed Mobility) contact guard;verbal cues  -AF        Bed Mobility, Safety Issues cognitive deficits limit understanding  -AF        Assistive Device (Bed Mobility) bed rails;head of bed elevated  -AF           Transfer Assessment/Treatment    Transfer Assessment/Treatment toilet transfer  -AF           Sit-Stand Transfer    Sit-Stand [place] (Transfers) contact guard;verbal cues  -AF           Stand-Sit Transfer    Stand-Sit [place] (Transfers) contact guard;verbal cues  -AF           Toilet Transfer     Type (Toilet Transfer) stand-sit;sit-stand  -AF        [place] Level (Toilet Transfer) minimum assist (75% patient effort);verbal cues  -AF        Assistive Device (Toilet Transfer) commode;grab bars/safety frame  -AF           ADL Assessment/Intervention    BADL Assessment/Intervention toileting;grooming  -AF           Grooming Assessment/Training    [place] Level (Grooming) grooming skills;hair care, combing/brushing;oral care regimen;wash face, hands;contact guard assist  -AF        Grooming Position supported sitting;supported standing  -AF        Comment (Grooming) pt alternated between sitting and standing at counter in bathroom to complete simplel grooming tasks. vc's to iniate tasks and decreased processing noted.   -AF           Toileting Assessment/Training    [place] Level (Toileting) toileting skills;contact guard assist;minimum assist (75% patient effort);verbal cues  -AF        Assistive Devices (Toileting) commode;grab bar/safety frame  -AF        Toileting Position supported standing;unsupported sitting  -AF        Comment (Toileting) increased time, fatigue noted  -AF           BADL Safety/Performance    Impairments, BADL Safety/Performance cognition;endurance/activity tolerance;motor planning  -AF           General ROM    GENERAL ROM COMMENTS BUE AROM WFL, delayed mvts noted   -AF           General Assessment (Manual Muscle Testing)    Comment, General Manual Muscle Testing (MMT) Assessment BUE 3+/5  -AF           Positioning and Restraints    Pre-Treatment Position in bed  -AF        Post Treatment Position bed  -AF        In Bed fowlers;call light within reach;encouraged to call for assist;exit alarm on;with family/caregiver   daughters present   -AF           Pain Scale: Numbers Pre/Post-Treatment    Pain Scale: Numbers, Pretreatment 0/10 - no pain  -AF        Pain Scale: Numbers, Post-Treatment 0/10 - no pain  -AF           Clinical Impression (OT)    Date of Referral to OT  07/06/18  -AF        OT Diagnosis deficits present after R CVA, decreased endurance, balance, processing and cognition noted which are limiting her independence with self care skills and valued occupations   -AF        Criteria for Skilled Therapeutic Interventions Met (OT Eval) yes;treatment indicated  -AF        Rehab Potential (OT Eval) good, to achieve stated therapy goals  -AF        Therapy Frequency (OT Eval) 3 times/wk  -AF        Care Plan Review, Other Participant (OT Eval) daughter  -AF        Anticipated Equipment Needs at Discharge (OT) bathing equipment  -AF           Planned OT Interventions    Planned Therapy Interventions (OT Eval) activity tolerance training;BADL retraining;functional balance retraining;cognitive/visual perception retraining;strengthening exercise;transfer/mobility retraining  -AF           OT Goals    Transfer Goal Selection (OT) transfer, OT goal 1  -AF        Toileting Goal Selection (OT) toileting, OT goal 1  -AF           Transfer Goal 1 (OT)    Activity/Assistive Device (Transfer Goal 1, OT) toilet;walker, rolling   grab bars  -AF        [place] Level/Cues Needed (Transfer Goal 1, OT) standby assist  -AF        Time Frame (Transfer Goal 1, OT) long term goal (LTG)  -AF        Progress/Outcome (Transfer Goal 1, OT) goal ongoing  -AF           Toileting Goal 1 (OT)    Activity/Device (Toileting Goal 1, OT) toileting skills, all;grab bar/safety frame  -AF        [place] Level/Cues Needed (Toileting Goal 1, OT) standby assist  -AF        Time Frame (Toileting Goal 1, OT) long term goal (LTG)  -AF        Progress/Outcome (Toileting Goal 1, OT) goal ongoing  -AF           Patient Education Goal (OT)    Activity (Patient Education Goal, OT) pt will demo safe techniques with commode transfer with min vc's   -AF        [place]/Cues/Accuracy (Memory Goal 2, OT) demonstrates adequately  -AF        Time Frame (Patient Education Goal, OT) long term goal (LTG)  -AF         Progress/Outcome (Patient Education Goal, OT) goal ongoing  -AF          User Key  (r) = Recorded By, (t) = Taken By, (c) = Cosigned By    Initials Name Effective Dates    AF Meagan Ocampo, OTR 04/03/18 -            Occupational Therapy Education     Title: PT OT SLP Therapies (Active)     Topic: Occupational Therapy (Active)     Point: ADL training (Done)     Description: Instruct learner(s) on proper safety adaptation and remediation techniques during self care or transfers.   Instruct in proper use of assistive devices.   Learning Progress Summary     Learner Status Readiness Method Response Comment Documented by    Patient Done Acceptance E VU,NR edcuated on benefits and purpose of OT during CVA recovery AF 07/09/18 1218    Family Done Acceptance E VU,NR edcuated on benefits and purpose of OT during CVA recovery AF 07/09/18 1218                      User Key     Initials Effective Dates Name Provider Type Discipline     04/03/18 -  Meagan Ocampo, OTR Occupational Therapist OT                  OT Recommendation and Plan  Outcome Summary/Treatment Plan (OT)  Anticipated Equipment Needs at Discharge (OT): bathing equipment  Planned Therapy Interventions (OT Eval): activity tolerance training, BADL retraining, functional balance retraining, cognitive/visual perception retraining, strengthening exercise, transfer/mobility retraining  Therapy Frequency (OT Eval): 3 times/wk             Outcome Measures     Row Name 07/09/18 1219 07/09/18 1100 07/08/18 1500       How much help from another person do you currently need...    Turning from your back to your side while in flat bed without using bedrails?  -- 4  -SM 4  -CN    Moving from lying on back to sitting on the side of a flat bed without bedrails?  -- 3  -SM 3  -CN    Moving to and from a bed to a chair (including a wheelchair)?  -- 3  -SM 3  -CN    Standing up from a chair using your arms (e.g., wheelchair, bedside chair)?  -- 3  -SM 3  -CN    Climbing 3-5  steps with a railing?  -- 3  -SM 3  -CN    To walk in hospital room?  -- 3  -SM 3  -CN    AM-PAC 6 Clicks Score  -- 19  -SM 19  -CN       How much help from another is currently needed...    Putting on and taking off regular lower body clothing? 2  -AF  --  --    Bathing (including washing, rinsing, and drying) 2  -AF  --  --    Toileting (which includes using toilet bed pan or urinal) 3  -AF  --  --    Putting on and taking off regular upper body clothing 3  -AF  --  --    Taking care of personal grooming (such as brushing teeth) 3  -AF  --  --    Eating meals 3  -AF  --  --    Score 16  -AF  --  --       Functional Assessment    Outcome Measure Options AM-PAC 6 Clicks Daily Activity (OT)  -AF AM-PAC 6 Clicks Basic Mobility (PT)  -SM AM-PAC 6 Clicks Basic Mobility (PT)  -CN    Row Name 07/07/18 1000             How much help from another person do you currently need...    Turning from your back to your side while in flat bed without using bedrails? 4  -KH      Moving from lying on back to sitting on the side of a flat bed without bedrails? 3  -KH      Moving to and from a bed to a chair (including a wheelchair)? 3  -KH      Standing up from a chair using your arms (e.g., wheelchair, bedside chair)? 3  -KH      Climbing 3-5 steps with a railing? 3  -KH      To walk in hospital room? 3  -KH      AM-PAC 6 Clicks Score 19  -KH         Functional Assessment    Outcome Measure Options AM-PAC 6 Clicks Basic Mobility (PT)  -KH        User Key  (r) = Recorded By, (t) = Taken By, (c) = Cosigned By    Initials Name Provider Type    AF Meagan Ocampo, OTR Occupational Therapist    SM Claritza Walker, PTA Physical Therapy Assistant    ALYSSA Grant, PT Physical Therapist    PAULINE Curry, PT Physical Therapist          Time Calculation:   OT Start Time: 0918  OT Stop Time: 0939  OT Time Calculation (min): 21 min  Therapy Suggested Charges     Code   Minutes Charges    None           Therapy Charges for Today      Code Description Service Date Service Provider Modifiers Qty    98289209930 HC OT EVAL LOW COMPLEXITY 2 7/9/2018 Meagan Ocampo, OTR GO 1    [id number]  OT SELF CARE/MGMT/TRAIN EA 15 MIN 7/9/2018 Meagan Ocampo, OTR GO 1               Meagan Ocampo, OTR  7/9/2018

## 2018-07-09 NOTE — PROGRESS NOTES
Discharge Planning Assessment  [facility]     Patient Name: Meggan Huynh  MRN: [id number]  Today's Date: 7/9/2018    Admit Date: 7/6/2018          Discharge Needs Assessment     Row Name 07/09/18 1731       Living Environment    Lives With alone    Current Living Arrangements home/apartment/condo    Primary Care Provided by child(justin)    Provides Primary Care For no one    Family Caregiver if Needed child(justin), adult    Quality of Family Relationships supportive;involved    Able to Return to Prior Arrangements no       Resource/Environmental Concerns    Resource/Environmental Concerns none    Transportation Concerns car, none       Transition Planning    Patient/Family Anticipates Transition to inpatient rehabilitation facility    Transportation Anticipated family or friend will provide       Discharge Needs Assessment    Readmission Within the Last 30 Days no previous admission in last 30 days    Concerns to be Addressed basic needs    Equipment Currently Used at Home cane, quad;walker, rolling;shower chair    Anticipated Changes Related to Illness inability to care for self    Discharge Facility/Level of Care Needs rehabilitation facility    Offered/Gave Vendor List yes            Discharge Plan     Row Name 07/09/18 1734       Plan    Plan acute rehab vs skilled    Patient/Family in Agreement with Plan yes    Plan Comments Met with patient and dgt julio Paredes [phone].  Dgt is medical POA for patient.  Patient lives alone in first floor condo.  2 dgts local who assist with housekeeping and any other needs.  Referral to [facility] acute rehab pending.  Gave dgt Transitions, [facility], and HH/SNU list.  Referrals sent to [facility] and [facility].  Will follow.        Destination     Service Request Status Selected Specialties Address Phone Number Fax Number    [facility] Pending - Request Sent N/A [address] [place] [address] [phone]     [facility] Pending - Request Sent N/A [address] [facility] [id number] [phone]    [facility] Pending - No Request Sent N/A [address] [facility] [address] [phone] --      Durable Medical Equipment     No service coordination in this encounter.      Dialysis/Infusion     No service coordination in this encounter.      Home Medical Care     No service coordination in this encounter.      Social Care     No service coordination in this encounter.                Demographic Summary     Row Name 07/09/18 1731       General Information    Admission Type inpatient    Arrived From emergency department    Referral Source admission list;physician    Reason for Consult discharge planning    Preferred Language English            Functional Status     Row Name 07/09/18 1731       Functional Status    Usual Activity Tolerance good    Current Activity Tolerance fair       Functional Status, IADL    Medications assistive person    Meal Preparation assistive person    Housekeeping assistive person    Laundry assistive person    Shopping assistive person       Mental Status    General Appearance WDL WDL       Mental Status Summary    Recent Changes in Mental Status/Cognitive Functioning unable to assess       Employment/    Employment Status retired            Psychosocial    No documentation.           Abuse/Neglect    No documentation.           Legal    No documentation.           Substance Abuse    No documentation.           Patient Forms    No documentation.         Yue Adkins RN

## 2018-07-09 NOTE — PLAN OF CARE
Problem: Fall Risk (Adult)  Goal: Absence of Fall  Outcome: Ongoing (interventions implemented as appropriate)   07/09/18 0321   Fall Risk (Adult)   Absence of Fall making progress toward outcome       Problem: Patient Care Overview  Goal: Plan of Care Review  Outcome: Ongoing (interventions implemented as appropriate)   07/09/18 0321   OTHER   Outcome Summary Patient demonstrated low O2 sats during bedside sleep study and was put back on nasal cannula to add necessary supplemental oxygen while the patient was asleep.   Coping/Psychosocial   Plan of Care Reviewed With patient   Plan of Care Review   Progress no change

## 2018-07-10 NOTE — THERAPY EVALUATION
Acute Care - Speech Language Pathology Initial Evaluation  [facility]     Patient Name: Meggan Huynh  : 1929  MRN: [id number]  Today's Date: 7/10/2018  Onset of Illness/Injury or Date of Surgery: 18     Referring Physician: Dr. Bazan      Admit Date: 2018     Visit Dx:    ICD-10-CM ICD-9-CM   1. Cerebrovascular accident (CVA) due to occlusion of left middle cerebral artery (CMS/HCC) I63.512 434.91   2. Chronic systolic congestive heart failure (CMS/HCC) I50.22 428.22     428.0   3. Chronic bronchitis, unspecified chronic bronchitis type (CMS/HCC) J42 491.9   4. Oropharyngeal dysphagia R13.12 787.22   5. Generalized weakness R53.1 780.79     Patient Active Problem List   Diagnosis   • Cerebrovascular accident (CVA) due to occlusion of left middle cerebral artery (CMS/HCC)   • Hypothyroidism   • Hypertension   • Hyperlipidemia   • Chronic renal disease, stage 3, moderately decreased glomerular filtration rate between 30-59 mL/min/1.73 square meter   • H/O CHF     Past Medical History:   Diagnosis Date   • Ankle pain, right    • Anxiety    • CHF (congestive heart failure) (CMS/HCC)     with preserved EF   • Chronic renal disease, stage 3, moderately decreased glomerular filtration rate between 30-59 mL/min/1.73 square meter    • COPD (chronic obstructive pulmonary disease) (CMS/HCC)    • Coronary artery disease    • Depression    • Hyperlipidemia    • Hypertension    • Hypothyroidism    • Stroke (CMS/HCC)      History reviewed. No pertinent surgical history.       SLP EVALUATION (last 72 hours)      SLP SLC Evaluation     Row Name 07/10/18 0930 18 1100                Communication Assessment/Intervention    Document Type evaluation  -SA evaluation  -SA       Subjective Information  -- no complaints  -SA       Patient Observations alert;cooperative  -SA alert;cooperative  -SA       Patient/Family Observations daughters present; left room for eval; state speech worse   -SA  --       Patient  Effort good  -SA good  -SA       Symptoms Noted During/After Treatment none  -SA none  -SA          General Information    Patient Profile Reviewed yes  -SA yes  -SA       Pertinent History Of Current Problem LMCA CVA  -SA LMCA CVA  -SA       Plans/Goals Discussed with patient and family  -SA patient  -SA       Barriers to Rehab previous functional deficit  -SA  --       Patient's Goals for Discharge patient did not state  -SA  --       Family Goals for Discharge other (see comments)   be able to communicate wants/needs  -SA  --          Comprehension Assessment/Intervention    Comprehension Assessment/Intervention Auditory Comprehension;Reading Comprehension  -SA  --          Auditory Comprehension Assessment/Intervention    Auditory Comprehension (Communication) moderate impairment  -SA  --       Able to Identify Objects/Pictures (Communication) mild impairment  -SA  --       Answers Questions (Communication) yes/no;concrete;abstract;mild impairment;moderate impairment   75%  -SA  --       Able to Follow Commands (Communication) 1-step;2-step;other (see comments);moderate impairment   1 step 100%; 2 step 0%  -SA  --       Successful Auditory Strategies (Communication) repetition;visual cues;phonemic cues  -SA  --          Expression Assessment/Intervention    Expression Assessment/Intervention verbal expression;graphic expression  -SA  --          Verbal Expression Assessment/Intervention    Verbal Expression moderate impairment;severe impairment  -SA  --       Automatic Speech (Communication) counting 1-20;days of week;moderate impairment;severe impairment   aided with visual mouth cues  -SA  --       Repetition moderate impairment;severe impairment   40%; 60% w/ cues  -SA  --       Phrase Completion moderate impairment;severe impairment  -SA  --       Responsive Naming severe impairment  -SA  --       Confrontational Naming severe impairment  -SA  --          Graphic Expression Assessment/Intervention    Graphic  Expression dominant hand;moderate impairment;severe impairment  -SA  --       Graphic Expression to Dictation severe impairment;moderate impairment  -SA  --          SLP Clinical Impressions    SLP Diagnosis --   Moderate-Severe receptive/expressive language impairment  -SA  --       Rehab Potential/Prognosis good  -SA  --       Criteria for Skilled Therapy Interventions Met yes  -SA  --       Functional Impact difficulty communicating wants, needs;difficulty communicating in an emergency;difficulty in expressing complex messages  -SA  --          Recommendations    Therapy Frequency (SLP SLC) PRN  -SA  --       Predicted Duration Therapy Intervention (Days) until discharge  -SA  --       Anticipated Dischage Disposition anticipate therapy at next level of care  -SA  --          Communication Treatment Objective and Progress Goals (SLP)    Auditory Comprehension Treatment Objectives Auditory Comprehension Treatment Objectives (Group)  -SA  --       Verbal Expression Treatment Objectives Verbal Expression Treatment Objectives (Group)  -SA  --          Auditory Comprehension Treatment Objectives    Words/Phrases/Sentences Selection words/phrases/sentences, SLP goal 1  -SA  --       Comprehend Questions Selection comprehend questions, SLP goal 1  -SA  --       Follow Directions Selection follow directions, SLP goal 1  -SA  --          Words/Phrases/Sentences Goal 1 (SLP)    Improve Ability to Comprehend Words/Phrases/Sentences Through: Goal 1 (SLP) identify familiar objects;90%;independently (over 90% accuracy)  -SA  --       Time Frame (Identify Objects and Pictures Goal 1, SLP) by discharge  -SA  --          Comprehend Questions Goal 1 (SLP)    Improve Ability to Comprehend Questions Goal 1 (SLP) simple yes/no questions;complex yes/no questions;90%;independently (over 90% accuracy)  -SA  --       Comment (Comprehend Questions Goal 1, SLP) verbally, with communication board, head nods  -SA  --          Follow  Directions Goal 2 (SLP)    Improve Ability to Follow Directions Goal 1 (SLP) 2 step commands;80%;with minimal cues (75-90%)  -SA  --          Verbal Expression Treatment Objectives    Word Retrieval Skills Selection word retrieval, SLP goal 1  -SA  --          Word Retrieval Skills Goal 1 (SLP)    Improve Word Retrieval Skills By Goal 1 (SLP) completing automatic speech task, counting;completing automatic speech task, days of the week;completing automatic speech task, months;confrontational naming task;responsive naming task;repeating words  -SA  --         User Key  (r) = Recorded By, (t) = Taken By, (c) = Cosigned By    Initials Name Effective Dates    SA Anaaminata Morelos, MS CCC-SLP 03/07/18 -                EDUCATION  The patient has been educated in the following areas:     Communication Impairment.    SLP Recommendation and Plan    SLP Diagnosis:  (Moderate-Severe receptive/expressive language impairment)    Rehab Potential/Prognosis: good    Criteria for Skilled Therapeutic Interventions Met: demonstrates skilled criteria  Criteria for Skilled Therapy Interventions Met: yes    Anticipated Dischage Disposition: anticipate therapy at next level of care         Therapy Frequency (Swallow): PRN    Predicted Duration Therapy Intervention (Days): until discharge          Plan of Care Review  Plan of Care Reviewed With: patient, family  Plan of Care Reviewed With: patient, family  Outcome Summary: Speech/language evaluation completed.  Pt demonstrated difficulties with following 2 step commands, and answering yes/no questions.  Unable to name objects, perform responsive naming tasks or automatic speech tasks witihout visual/phonemic/phrase completion cues.  REC skilled speech/language therapy to address deficits              SLP GOALS     Row Name 07/10/18 0930 07/09/18 1208          Oral Nutrition/Hydration Goal 1 (SLP)    Oral Nutrition/Hydration Goal 1, SLP  -- Pt will tolerate least restrictive diet  -SA     Time  Frame (Oral Nutrition/Hydration Goal 1, SLP)  -- by discharge  -SA        Lingual Strengthening Goal 1 (SLP)    Activity (Lingual Strengthening Goal 1, SLP)  -- increase lingual tone/sensation/control/coordination/movement  -SA        Words/Phrases/Sentences Goal 1 (SLP)    Improve Ability to Comprehend Words/Phrases/Sentences Through: Goal 1 (SLP) identify familiar objects;90%;independently (over 90% accuracy)  -SA  --     Time Frame (Identify Objects and Pictures Goal 1, SLP) by discharge  -SA  --        Comprehend Questions Goal 1 (SLP)    Improve Ability to Comprehend Questions Goal 1 (SLP) simple yes/no questions;complex yes/no questions;90%;independently (over 90% accuracy)  -SA  --     Comment (Comprehend Questions Goal 1, SLP) verbally, with communication board, head nods  -SA  --        Follow Directions Goal 2 (SLP)    Improve Ability to Follow Directions Goal 1 (SLP) 2 step commands;80%;with minimal cues (75-90%)  -SA  --        Word Retrieval Skills Goal 1 (SLP)    Improve Word Retrieval Skills By Goal 1 (SLP) completing automatic speech task, counting;completing automatic speech task, days of the week;completing automatic speech task, months;confrontational naming task;responsive naming task;repeating words  -  --       User Key  (r) = Recorded By, (t) = Taken By, (c) = Cosigned By    Initials Name Provider Type    SA Ana Morelos MS CCC-SLP Speech and Language Pathologist                 SLP Outcome Measures (last 72 hours)      SLP Outcome Measures     Row Name 07/10/18 0930 07/09/18 1700          SLP Outcome Measures    Outcome Measure Used? Adult NOMS  -SA Adult NOMS  -SA        FCM Scores    Swallowing FCM Score  -- 4  -SA     Spoken Language Expression FCM Score 2  -SA  --       User Key  (r) = Recorded By, (t) = Taken By, (c) = Cosigned By    Initials Name Effective Dates    SA Ana Morelos MS CCC-SLP 03/07/18 -               Time Calculation:           Time Calculation- SLP     Row Name  07/10/18 1445             Time Calculation- SLP    SLP Start Time 0930  -      SLP Stop Time 1045  -      SLP Time Calculation (min) 75 min  -      SLP Received On 07/10/18  -        User Key  (r) = Recorded By, (t) = Taken By, (c) = Cosigned By    Initials Name Provider Type    SA Ana Morelos MS CCC-SLP Speech and Language Pathologist          Therapy Charges for Today     Code Description Service Date Service Provider Modifiers Qty    32976931288 HC ST MOTION FLUORO EVAL SWALLOW 5 7/9/2018 Ana Morelos MS CCC-SLP GN 1    03607417697 HC ST EVAL SPEECH AND PROD W LANG  5 7/10/2018 Ana Morelos MS CCC-BEBE GN 1             ADULT NOMS (last 72 hours)      Adult NOMS     Row Name 07/10/18 0930 07/09/18 1700                FCM Scores    Swallowing FCM Score  -- 4  -SA       Spoken Language Expression FCM Score 2  -SA  --         User Key  (r) = Recorded By, (t) = Taken By, (c) = Cosigned By    Initials Name Effective Dates     MS ELIZABETH MedeirosSLP 03/07/18 -                MS BRET Medeiros  7/10/2018

## 2018-07-10 NOTE — NURSING NOTE
Request for stroke screening per Epic order set changed to full referral 7/9 per request of Yue FIORE ([facility]).  Records reviewed and discussed with Bing bowens by phone who is concerned re: three hrs. per day of therapy and states they would have to look at hired assistance after d/c from rehab--no family able to provide 24/7 care. Per dgt., patient's usual daily routine is to get up approx. 1100 hrs., watch TV and nap throughout the day.  Discussed with Rehab team and recommend subacute rehab at this time.  Left message for Yue FIORE and left voice mail message for kwan Smart.  If any questions, please call [phone].  Thank you--Adelaide Walker,  Rehab Adm Nurse

## 2018-07-10 NOTE — PLAN OF CARE
Problem: Patient Care Overview  Goal: Plan of Care Review  Outcome: Ongoing (interventions implemented as appropriate)   07/09/18 2110   OTHER   Outcome Summary Patient alert to self. Difficult to assess r/t aphasia and dysarthria. Still very tachycardic. Coreg restarted today. Cardiology saw patient and using 24 holter monitor to see if any a-fib can be captured. Daughters discussing rehab facilities w/ CCP. Continue to monitor.    Coping/Psychosocial   Plan of Care Reviewed With patient   Plan of Care Review   Progress improving

## 2018-07-10 NOTE — THERAPY TREATMENT NOTE
Acute Care - Physical Therapy Treatment Note  [facility]     Patient Name: Meggan Huynh  : 1929  MRN: [id number]  Today's Date: 7/10/2018  Onset of Illness/Injury or Date of Surgery: 18  Date of Referral to PT: 18  Referring Physician: Dr. Bazan    Admit Date: 2018    Visit Dx:    ICD-10-CM ICD-9-CM   1. Cerebrovascular accident (CVA) due to occlusion of left middle cerebral artery (CMS/HCC) I63.512 434.91   2. Chronic systolic congestive heart failure (CMS/HCC) I50.22 428.22     428.0   3. Chronic bronchitis, unspecified chronic bronchitis type (CMS/HCC) J42 491.9   4. Oropharyngeal dysphagia R13.12 787.22   5. Generalized weakness R53.1 780.79     Patient Active Problem List   Diagnosis   • Cerebrovascular accident (CVA) due to occlusion of left middle cerebral artery (CMS/HCC)   • Hypothyroidism   • Hypertension   • Hyperlipidemia   • Chronic renal disease, stage 3, moderately decreased glomerular filtration rate between 30-59 mL/min/1.73 square meter   • H/O CHF       Therapy Treatment          Rehabilitation Treatment Summary     Row Name 07/10/18 1330             Treatment Time/Intention    Discipline physical therapy assistant  -SM      Document Type therapy note (daily note)  -SM      Subjective Information no complaints  -SM      Mode of Treatment physical therapy  -SM      Patient Effort good  -SM      Existing Precautions/Restrictions fall  -SM      Recorded by [SM] Claritza Walker, [facility] 07/10/18 1410      Row Name 07/10/18 1330             Vital Signs    O2 Delivery Pre Treatment supplemental O2  -SM      O2 Delivery Intra Treatment supplemental O2  -SM      O2 Delivery Post Treatment supplemental O2  -SM      Recorded by [] Claritza Walker, [facility] 07/10/18 1410      Row Name 07/10/18 1330             Cognitive Assessment/Intervention- PT/OT    Orientation Status (Cognition) oriented to;person;place  -SM      Follows Commands (Cognition) follows one step commands;over  90% accuracy  -SM      Safety Deficit (Cognitive) mild deficit  -SM      Personal Safety Interventions fall prevention program maintained;gait belt;nonskid shoes/slippers when out of bed  -SM      Recorded by [] Claritza Walker, [facility] 07/10/18 1410      Row Name 07/10/18 1330             Bed Mobility Assessment/Treatment    Bed Mobility Assessment/Treatment supine-sit;sit-supine  -SM      Supine-Sit [place] (Bed Mobility) supervision  -SM      Sit-Supine [place] (Bed Mobility) contact guard  -      Assistive Device (Bed Mobility) bed rails;head of bed elevated  -SM      Recorded by [SM] Claritza Walker, PTA 07/10/18 1410      Row Name 07/10/18 1330             Sit-Stand Transfer    Sit-Stand [place] (Transfers) contact guard;minimum assist (75% patient effort)  -      Assistive Device (Sit-Stand Transfers) walker, front-wheeled  -SM      Recorded by [] Claritza Walker, [facility] 07/10/18 1410      Row Name 07/10/18 1330             Stand-Sit Transfer    Stand-Sit [place] (Transfers) contact guard  -      Assistive Device (Stand-Sit Transfers) walker, front-wheeled  -SM      Recorded by [SM] Claritza Walker, [facility] 07/10/18 1410      Row Name 07/10/18 1330             Gait/Stairs Assessment/Training    Gait/Stairs Assessment/Training gait/ambulation independence;gait/ambulation assistive device;distance ambulated;gait deviations  -SM      [place] Level (Gait) contact guard  -      Assistive Device (Gait) walker, front-wheeled  -      Distance in Feet (Gait) 100  -SM      Deviations/Abnormal Patterns (Gait) stride length decreased  -SM      Bilateral Gait Deviations forward flexed posture  -SM      Recorded by [SM] Claritza Walker, PTA 07/10/18 1410      Row Name 07/10/18 1330             Motor Skills Assessment/Interventions    Additional Documentation Therapeutic Exercise (Group)  -      Recorded by [] Claritza Walker, [facility] 07/10/18 1410      Row Name 07/10/18 1330              Therapeutic Exercise    Lower Extremity (Therapeutic Exercise) LAQ (long arc quad), bilateral;marching while seated  -      Lower Extremity Range of Motion (Therapeutic Exercise) ankle dorsiflexion/plantar flexion, bilateral  -      Exercise Type (Therapeutic Exercise) AROM (active range of motion)  -      Position (Therapeutic Exercise) seated  -      Sets/Reps (Therapeutic Exercise) 10 reps  -      Recorded by [] Claritza Laura Walker, [facility] 07/10/18 1410      Row Name 07/10/18 1330             Positioning and Restraints    Pre-Treatment Position in bed  -      Post Treatment Position bed  -SM      In Bed supine;call light within reach;encouraged to call for assist;exit alarm on;with family/caregiver  -SM      Recorded by [] Claritza Walker, [facility] 07/10/18 1410      Row Name 07/10/18 1330             Pain Assessment    Additional Documentation Pain Scale: Numbers Pre/Post-Treatment (Group)  -      Recorded by [] Claritza Walker, [facility] 07/10/18 1410      Row Name 07/10/18 1330             Pain Scale: Numbers Pre/Post-Treatment    Pain Scale: Numbers, Pretreatment 0/10 - no pain  -SM      Recorded by [] Claritza Laura Walker, [facility] 07/10/18 1410        User Key  (r) = Recorded By, (t) = Taken By, (c) = Cosigned By    Initials Name Effective Dates Discipline     Claritza Sanchez Walker, [facility] 03/07/18 -  PT                     Physical Therapy Education     Title: PT OT SLP Therapies (Active)     Topic: Physical Therapy (Active)     Point: Mobility training (Active)    Learning Progress Summary     Learner Status Readiness Method Response Comment Documented by    Patient Active Acceptance E,D NR   07/10/18 1410     Active Acceptance E,TB NR   07/09/18 1103     Active Acceptance E NR  CN 07/08/18 1528     Active Acceptance E,TB NR   07/07/18 1022          Point: Home exercise program (Active)    Learning Progress Summary     Learner Status Readiness Method Response Comment Documented by     Patient Active Acceptance E,D NR   07/10/18 1410          Point: Body mechanics (Active)    Learning Progress Summary     Learner Status Readiness Method Response Comment Documented by    Patient Active Acceptance E,D NR   07/10/18 1410     Active Acceptance E,TB NR  SM 07/09/18 1103     Active Acceptance E NR  CN 07/08/18 1528     Active Acceptance E,TB NR   07/07/18 1022          Point: Precautions (Active)    Learning Progress Summary     Learner Status Readiness Method Response Comment Documented by    Patient Active Acceptance E,D NR   07/10/18 1410     Active Acceptance E,TB NR   07/09/18 1103     Active Acceptance E NR  CN 07/08/18 1528     Active Acceptance E,TB NR   07/07/18 1022                      User Key     Initials Effective Dates Name Provider Type Discipline     03/07/18 -  Claritza Walker, PTA Physical Therapy Assistant PT     04/03/18 -  Mikki Grant, PT Physical Therapist PT     06/22/16 -  Ketty Curry, PT Physical Therapist PT                    PT Recommendation and Plan     Plan of Care Reviewed With: patient  Progress: improving  Outcome Summary: Pt tolerated treatment well this date. No c/o pain. Required CGA for ambulating 100ft w/ Rw. Improved yoav this date. PT will continue to address functional mobility deficits as tolerated.          Outcome Measures     Row Name 07/10/18 1400 07/09/18 1219 07/09/18 1100       How much help from another person do you currently need...    Turning from your back to your side while in flat bed without using bedrails? 4  -SM  -- 4  -SM    Moving from lying on back to sitting on the side of a flat bed without bedrails? 3  -SM  -- 3  -SM    Moving to and from a bed to a chair (including a wheelchair)? 3  -SM  -- 3  -SM    Standing up from a chair using your arms (e.g., wheelchair, bedside chair)? 3  -SM  -- 3  -SM    Climbing 3-5 steps with a railing? 3  -SM  -- 3  -SM    To walk in hospital room? 3  -SM  -- 3  -SM     AM-PAC 6 Clicks Score 19  -SM  -- 19  -SM       How much help from another is currently needed...    Putting on and taking off regular lower body clothing?  -- 2  -AF  --    Bathing (including washing, rinsing, and drying)  -- 2  -AF  --    Toileting (which includes using toilet bed pan or urinal)  -- 3  -AF  --    Putting on and taking off regular upper body clothing  -- 3  -AF  --    Taking care of personal grooming (such as brushing teeth)  -- 3  -AF  --    Eating meals  -- 3  -AF  --    Score  -- 16  -AF  --       Functional Assessment    Outcome Measure Options AM-PAC 6 Clicks Basic Mobility (PT)  -SM AM-PAC 6 Clicks Daily Activity (OT)  -AF AM-PAC 6 Clicks Basic Mobility (PT)  -    Row Name 07/08/18 1500             How much help from another person do you currently need...    Turning from your back to your side while in flat bed without using bedrails? 4  -CN      Moving from lying on back to sitting on the side of a flat bed without bedrails? 3  -CN      Moving to and from a bed to a chair (including a wheelchair)? 3  -CN      Standing up from a chair using your arms (e.g., wheelchair, bedside chair)? 3  -CN      Climbing 3-5 steps with a railing? 3  -CN      To walk in hospital room? 3  -CN      AM-PAC 6 Clicks Score 19  -CN         Functional Assessment    Outcome Measure Options AM-[facility] 6 Clicks Basic Mobility (PT)  -CN        User Key  (r) = Recorded By, (t) = Taken By, (c) = Cosigned By    Initials Name Provider Type    AF Meagan Ocampo, OTR Occupational Therapist    SM Claritza Walker, PTA Physical Therapy Assistant    CN Mikik Grant, PT Physical Therapist           Time Calculation:         PT Charges     Row Name 07/10/18 1412             Time Calculation    Start Time 1330  -SM      Stop Time 1353  -      Time Calculation (min) 23 min  -      PT Received On 07/10/18  -      PT - Next Appointment 07/11/18  -        User Key  (r) = Recorded By, (t) = Taken By, (c) = Cosigned  By    Initials Name Provider Type     Claritza Walker PTA Physical Therapy Assistant        Therapy Suggested Charges     Code   Minutes Charges    53929 (CPT®) Hc Pt Neuromusc Re Education Ea 15 Min      52657 (CPT®) Hc Pt Ther Proc Ea 15 Min 12 1    86892 (CPT®) Hc Gait Training Ea 15 Min      76425 (CPT®) Hc Pt Therapeutic Act Ea 15 Min      76492 (CPT®) Hc Pt Manual Therapy Ea 15 Min      82058 (CPT®) Hc Pt Iontophoresis Ea 15 Min      47101 (CPT®) Hc Pt Elec Stim Ea-Per 15 Min      15565 (CPT®) Hc Pt Ultrasound Ea 15 Min      89525 (CPT®) Hc Pt Self Care/Mgmt/Train Ea 15 Min      Total  12 1        Therapy Charges for Today     Code Description Service Date Service Provider Modifiers Qty    43443576530 HC PT THER PROC EA 15 MIN 7/9/2018 Claritza Walker PTA GP 1    92752242217 HC PT THER PROC EA 15 MIN 7/10/2018 Claritza Walker PTA GP 2    91432995304 HC PT THER SUPP EA 15 MIN 7/10/2018 Claritza Walker PTA GP 1          PT G-Codes  Outcome Measure Options: AM-PAC 6 Clicks Basic Mobility (PT)    Claritza Walker PTA  7/10/2018

## 2018-07-10 NOTE — PROGRESS NOTES
Name: Meggan Huynh ADMIT: 2018   : 1929  PCP: Devan Snow MD    MRN: [id number] LOS: 4 days   AGE/SEX: 88 y.o. female  ROOM: [facility], [facility]–1983   Subjective   Possible AFib yesterday per telemetry but unable to document on EKG. Holter now placed. No CP SOA NVD when asked. She is still aphasic so communication is limited.    Objective   Vital Signs  Temp:  [97.6 °F (36.4 °C)-98.5 °F (36.9 °C)] 98.2 °F (36.8 °C)  Heart Rate:  [] 117  Resp:  [16-24] 16  BP: (117-159)/(58-94) 150/85  SpO2:  [92 %-98 %] 97 %  on  Flow (L/min):  [1-2] 1;   Device (Oxygen Therapy): nasal cannula  Body mass index is 31.64 kg/m².    Physical Exam   Constitutional: She appears well-developed. No distress.   HENT:   Head: Normocephalic and atraumatic.   Eyes: Conjunctivae are normal. Pupils are equal, round, and reactive to light.   Neck: Neck supple.   Cardiovascular: Regular rhythm and intact distal pulses.  Tachycardia present.    Pulmonary/Chest: Effort normal and breath sounds normal. No stridor. She has no wheezes.   Abdominal: Soft. She exhibits no distension. There is no tenderness.   Musculoskeletal: She exhibits no edema or tenderness.   Neurological: She is alert.   Strength equal bilaterally. Expressive aphasic speech.   Skin: Skin is warm and dry. She is not diaphoretic.   Psychiatric: She has a normal mood and affect. Her behavior is normal.   Nursing note and vitals reviewed.      Results Review:       I reviewed the patient's new clinical results.     I reviewed imaging, agree with interpretation.     I reviewed telemetry/EKG results, sinus tachycardia currently.        Results from last 7 days  Lab Units 07/10/18  0534 18  0547 18  0509 18  0618   WBC 10*3/mm3 9.01 11.40* 9.01 9.96   HEMOGLOBIN g/dL 10.8* 11.0* 10.8* 11.9   PLATELETS 10*3/mm3 141 182 158 169     Results from last 7 days  Lab Units 07/10/18  0535 18  0742 18  0509 18  0618   SODIUM mmol/L 148* 148* 144 141    POTASSIUM mmol/L 3.4* 3.6 3.5 3.5   CHLORIDE mmol/L 108* 107 107 103   CO2 mmol/L 26.4 26.7 24.8 24.3   BUN mg/dL 19 15 17 20   CREATININE mg/dL 0.96 0.98 1.09* 1.02*   GLUCOSE mg/dL 113* 141* 134* 109*   Estimated Creatinine Clearance: 39.3 mL/min (by C-G formula based on SCr of 0.96 mg/dL).  Results from last 7 days  Lab Units 07/10/18  0535 07/09/18  0742 07/08/18  0509 07/07/18  0618 07/06/18  1255 07/03/18  1725   CALCIUM mg/dL 7.9* 8.1* 8.0* 7.6* 8.1* 8.5*   ALBUMIN g/dL  --   --   --   --  4.20 4.00   MAGNESIUM mg/dL  --   --   --   --   --  2.1         aspirin 325 mg Oral Daily   Or      aspirin 300 mg Rectal Daily   atorvastatin 80 mg Oral Nightly   azelastine 1 spray Each Nare Daily   budesonide-formoterol 2 puff Inhalation BID - RT   calcium carbonate 2 tablet Oral Daily   carvedilol 6.25 mg Oral BID With Meals   cetirizine 5 mg Oral Daily   cholecalciferol 2,000 Units Oral QAM   clopidogrel 75 mg Oral Daily   escitalopram 10 mg Oral Nightly   fluticasone 1 spray Each Nare Daily   furosemide 20 mg Oral QAM   ipratropium 1 spray Each Nare Daily   ipratropium-albuterol 3 mL Nebulization 4x Daily - RT   levothyroxine 50 mcg Oral Daily   O2 2 L/min Inhalation Nightly       Pharmacy Consult    Diet Pureed; Thin      Assessment/Plan      Active Hospital Problems    Diagnosis Date Noted   • Cerebrovascular accident (CVA) due to occlusion of left middle cerebral artery (CMS/HCC) [I63.512] 07/06/2018   • Hypothyroidism [E03.9] 07/06/2018   • Hypertension [I10] 07/06/2018   • Hyperlipidemia [E78.5] 07/06/2018   • Chronic renal disease, stage 3, moderately decreased glomerular filtration rate between 30-59 mL/min/1.73 square meter [N18.3] 07/06/2018   • H/O CHF [Z86.79] 07/06/2018      Resolved Hospital Problems    Diagnosis Date Noted Date Resolved   No resolved problems to display.     - CVA: Large left MCA stroke. Continue ASA/Plavix/Statin. TTE with preserved EF, no PFO, and RODERICK. Holter placed for possibility  of AFib. Difficulty with self care and communication will require 24/7 care. Cardiology, Neurology following.  - Chronic Diastolic HF: Preserved EF. Continue Lasix/Coreg.  - CKD3: Stable  - Dysphagia: SLP following. Modified diet.  - Nighttime Hypoxia: PRATIMA likely. Nightime O2 as needed. Sleep medicine consulted.  - Tachycardia: Continues. BP is above goal so will increase coreg. Replace potassium. TSH was normal on 07/03.  - HTN  - COPD: Symbicort. Albuterol PRN. Will change duoneb to tiotropium to see if less albuterol helps HR.  - Disposition: SNF/possibly tomorrow    >35 minutes time spent    Meir Arriaga MD  [facility]  07/10/18  9:18 AM

## 2018-07-10 NOTE — PLAN OF CARE
Problem: Fall Risk (Adult)  Goal: Identify Related Risk Factors and Signs and Symptoms  Outcome: Ongoing (interventions implemented as appropriate)    Goal: Absence of Fall  Outcome: Ongoing (interventions implemented as appropriate)      Problem: Patient Care Overview  Goal: Plan of Care Review  Outcome: Ongoing (interventions implemented as appropriate)   07/09/18 2110 07/10/18 0308   OTHER   Outcome Summary --  Pt. remains Alert to self. Difficult to assess orientation due to dysarthria and aphasia. Remains tachycardic in 110s. 24 hour holter monitor in place to attempt to catch a-fib. Plan for patient to go to rehab when precert obtained.    Coping/Psychosocial   Plan of Care Reviewed With patient --    Plan of Care Review   Progress --  no change     Goal: Individualization and Mutuality  Outcome: Ongoing (interventions implemented as appropriate)    Goal: Discharge Needs Assessment  Outcome: Ongoing (interventions implemented as appropriate)      Problem: Skin Injury Risk (Adult)  Goal: Identify Related Risk Factors and Signs and Symptoms  Outcome: Ongoing (interventions implemented as appropriate)    Goal: Skin Health and Integrity  Outcome: Ongoing (interventions implemented as appropriate)

## 2018-07-10 NOTE — PROGRESS NOTES
"DOS: 7/10/2018  NAME: Meggan Huynh   : 1929  PCP: Devan Snow MD  Chief Complaint   Patient presents with   • Altered Mental Status     since last night at    • Unable to speak     CC: Stroke    Subjective: No new events overnight per RN. Cardiology notes no documented afib thus far.  Patient denies headache or any new stroke/TIA symptoms. Daughter at bedside.    Interval History  History taken from: patient chart family RN    Objective:  Vital signs: BP 97/69 (BP Location: Left leg, Patient Position: Lying)   Pulse (!) 122 Comment: nurse notified  Temp 98.9 °F (37.2 °C) (Oral)   Resp 17   Ht 157.5 cm (62\")   Wt 78.5 kg (173 lb)   SpO2 96%   BMI 31.64 kg/m²       Physical Exam:  GENERAL: NAD  HEENT: Normocephalic, atraumatic   COR: Sinus Tach  Resp: Even and unlabored  Extremities: No signs of distal embolization. TEDs and SCDs in place.    Neurological:   MS: Alert. Global aphasia (expressive > receptive). She nods appropriately at times. Can count to 8. States \"Thumb\" correctly. Followed most commands with mimicking. No neglect  CN: Pupils equal, reactive. No obvious VF deficit on gross testing. Flattening of right nasolabial fold.   Motor: Nearly normal strength and tone on the left, at least 4/5. RUE strength 3-4/5 with pronator drift. RLE strength at least 3-4/5.  Sensory: Intact to noxious stimulus.   Coordination: Not following complex commands  Patient examined: Changes noted.    Results Review:     I reviewed the patient's new clinical results.    Current Medications:  Scheduled Medications:  aspirin 325 mg Oral Daily   Or      aspirin 300 mg Rectal Daily   atorvastatin 80 mg Oral Nightly   azelastine 1 spray Each Nare Daily   budesonide-formoterol 2 puff Inhalation BID - RT   calcium carbonate 2 tablet Oral Daily   carvedilol 12.5 mg Oral BID With Meals   cetirizine 5 mg Oral Daily   cholecalciferol 2,000 Units Oral QAM   clopidogrel 75 mg Oral Daily   escitalopram 10 mg Oral " Nightly   fluticasone 1 spray Each Nare Daily   furosemide 20 mg Oral QAM   ipratropium 1 spray Each Nare Daily   levothyroxine 50 mcg Oral Daily   O2 2 L/min Inhalation Nightly   tiotropium 1 capsule Inhalation Daily - RT     Infusions:   Pharmacy Consult      PRN Medications:  •  albuterol  •  bisacodyl  •  calcium carbonate  •  magnesium hydroxide  •  ondansetron **OR** ondansetron ODT **OR** ondansetron  •  Pharmacy Consult  •  potassium chloride **OR** potassium chloride **OR** potassium chloride  •  sodium chloride  •  Insert peripheral IV **AND** sodium chloride    Medications Reviewed    Laboratory results:  Lab Results   Component Value Date    GLUCOSE 113 (H) 07/10/2018    CALCIUM 7.9 (L) 07/10/2018     (H) 07/10/2018    K 3.4 (L) 07/10/2018    CO2 26.4 07/10/2018     (H) 07/10/2018    BUN 19 07/10/2018    CREATININE 0.96 07/10/2018    EGFRIFAFRI  09/23/2016      Comment:      <15 Indicative of kidney failure.    EGFRIFNONA 55 (L) 07/10/2018    BCR 19.8 07/10/2018    ANIONGAP 13.6 07/10/2018     Lab Results   Component Value Date    WBC 9.01 07/10/2018    HGB 10.8 (L) 07/10/2018    HCT 34.8 (L) 07/10/2018    MCV 90.4 07/10/2018     07/10/2018        Results from last 7 days  Lab Units 07/07/18  0618   CHOLESTEROL mg/dL 107     Lab Results   Component Value Date    INR 1.11 (H) 07/06/2018    PROTIME 14.1 07/06/2018     Lab Results   Component Value Date    TSH 2.980 07/03/2018     No components found for: LDLCALC  Lab Results   Component Value Date    HGBA1C 5.90 (H) 07/07/2018     Lab Results   Component Value Date    CHOL 107 07/07/2018    TRIG 106 07/07/2018    HDL 49 07/07/2018    LDL 37 07/07/2018     Lab Results   Component Value Date    DAGGUILO13 485 07/11/2017     Review and interpretation of imaging:    Impression:  Ms Huynh is an 89 yo female with PMH stroke (on plavix), HTN, HLD, anxiety/depression, hypothyroidism, CHF, CKD, CAD, COPD, who presented with speech difficulty and  right facial droop. Symptoms were present upon waking. Pt had recent admission for uncontrolled HTN.  Her imaging showed a large acute left MCA stroke and chronic posterior LMCA stroke. Her vessel imaging showed extensive calcification and plaque in proximal vessels with left subclavian stenosis, left vertebral stenosis with likely back filling from basilar to post-PICA vert, R CCA 70% narrowing, R ICA 70% stenosis, Distal L M1 acute embolus and CTP showed perfusion mismatch. She was out of the window for tPA and per JADYN was not thought to be a candidate for embolectomy.     She has been evaluated by cardiology, no plans for RAFAEL due to patient's age but plans were ZIO patch at discharge. Per family the patient does not have a history of atrial fibrillation and prior to this event was living independently. However, there was documentation by nursing of Afib; however, no EKG strips to confirm. Per cardiology notes, no evidence as of yet, Holter monitor was placed. If she is found to have PAF, this would alter management. Clinically, she continues with significant aphasia but some improvement. For now, continue ASA, Plavix and Lipitor. Continue neurochecks, PT/OT/ST and CCP for rehab placement.    Plan:  Prolonged cardiac monitoring  Aspirin 325mg  Plavix 75mg  Lipitor 80 mg  Hydrate  Neurochecks  Check Lipid panel, Hgb A1C  Non-pharmacological DVT prophylaxis  EKG Tele  PT/OT/ST  Stroke Education  Secondary stroke prevention:    Blood pressure control to <130/80   Goal LDL <70-recommend high dose statins-    Serum glucose < 140    I have discussed the above with Dr. Timmons, RN, patient and family.  Angelia Lopez, RESHMA  07/10/18  11:46 AM      Active Problems:    Cerebrovascular accident (CVA) due to occlusion of left middle cerebral artery (CMS/HCC)    Hypothyroidism    Hypertension    Hyperlipidemia    Chronic renal disease, stage 3, moderately decreased glomerular filtration rate between 30-59 mL/min/1.73  square meter    H/O CHF

## 2018-07-10 NOTE — PLAN OF CARE
Problem: Patient Care Overview  Goal: Plan of Care Review  Outcome: Ongoing (interventions implemented as appropriate)   07/10/18 8777   OTHER   Outcome Summary Swallow treaatment to advance diet. Able to masticate soft solids, but orala residue with soft solid requiring liquid wash. REC advance diet to fork mashed.   Coping/Psychosocial   Plan of Care Reviewed With patient

## 2018-07-10 NOTE — THERAPY TREATMENT NOTE
Acute Care - Speech Language Pathology   Swallow Treatment Note [facility]     Patient Name: Meggan Huynh  : 1929  MRN: [id number]  Today's Date: 7/10/2018  Onset of Illness/Injury or Date of Surgery: 18     Referring Physician: Dr. Bazan      Admit Date: 2018    Visit Dx:      ICD-10-CM ICD-9-CM   1. Cerebrovascular accident (CVA) due to occlusion of left middle cerebral artery (CMS/HCC) I63.512 434.91   2. Chronic systolic congestive heart failure (CMS/HCC) I50.22 428.22     428.0   3. Chronic bronchitis, unspecified chronic bronchitis type (CMS/HCC) J42 491.9   4. Oropharyngeal dysphagia R13.12 787.22   5. Generalized weakness R53.1 780.79     Patient Active Problem List   Diagnosis   • Cerebrovascular accident (CVA) due to occlusion of left middle cerebral artery (CMS/HCC)   • Hypothyroidism   • Hypertension   • Hyperlipidemia   • Chronic renal disease, stage 3, moderately decreased glomerular filtration rate between 30-59 mL/min/1.73 square meter   • H/O CHF       Therapy Treatment    Therapy Treatment / Health Promotion    Treatment Time/Intention  Discipline: speech language pathologist (07/10/18 1600 : Ana Morelos MS CCC-SLP)  Document Type: therapy note (daily note) (07/10/18 1600 : Ana Morelos MS CCC-SLP)  Subjective Information: no complaints (07/10/18 1600 : Ana Morelos MS CCC-SLP)  Patient/Family Observations: daughters present; left room for eval; state speech worse  (07/10/18 0930 : Ana Morelos MS CCC-SLP)  Patient Effort: good (07/10/18 1600 : Ana Morelos MS CCC-SLP)  Plan of Care Review  Plan of Care Reviewed With: patient (07/10/18 1619 : Ana Morelos MS CCC-SLP)    Vitals/Pain/Safety       Cognition, Communication, Swallow  Auditory Comprehension Assessment/Intervention  Auditory Comprehension (Communication): moderate impairment (07/10/18 0930 : Ana Morelos MS CCC-SLP)  Able to Identify Objects/Pictures (Communication): mild impairment (07/10/18  0930 : Ana Morelos MS CCC-SLP)  Answers Questions (Communication): yes/no, concrete, abstract, mild impairment, moderate impairment (75%) (07/10/18 0930 : Ana Morelos MS CCC-SLP)  Able to Follow Commands (Communication): 1-step, 2-step, other (see comments), moderate impairment (1 step 100%; 2 step 0%) (07/10/18 0930 : Ana Morelos MS CCC-SLP)  Successful Auditory Strategies (Communication): repetition, visual cues, phonemic cues (07/10/18 0930 : Ana Morelos MS CCC-SLP)  Verbal Expression Assessment/Intervention  Verbal Expression: moderate impairment, severe impairment (07/10/18 0930 : Ana Morelos MS CCC-SLP)  Automatic Speech (Communication): counting 1-20, days of week, moderate impairment, severe impairment (aided with visual mouth cues) (07/10/18 0930 : Ana Morelos MS CCC-SLP)  Repetition: moderate impairment, severe impairment (40%; 60% w/ cues) (07/10/18 0930 : Ana Morelos MS CCC-SLP)  Phrase Completion: moderate impairment, severe impairment (07/10/18 0930 : Ana Morelos MS CCC-SLP)  Responsive Naming: severe impairment (07/10/18 0930 : Ana Morelos MS CCC-SLP)  Confrontational Naming: severe impairment (07/10/18 0930 : Ana Morelos MS CCC-SLP)  Graphic Expression Assessment/Intervention  Graphic Expression: dominant hand, moderate impairment, severe impairment (07/10/18 0930 : Ana Morelos MS CCC-SLP)  Graphic Expression to Dictation: severe impairment, moderate impairment (07/10/18 0930 : Ana Morelos MS CCC-SLP)  Recommendations  Predicted Duration Therapy Intervention (Days): until discharge (07/10/18 0930 : Ana Morelos MS CCC-SLP)  Anticipated Dischage Disposition: anticipate therapy at next level of care (07/10/18 0930 : Ana Morelos MS CCC-SLP)    Outcome Summary  Outcome Summary/Treatment Plan (SLP)  Anticipated Dischage Disposition: anticipate therapy at next level of care (07/10/18 0930 : Ana Morelos MS CCC-SLP)            SLP GOALS     Row Name  07/10/18 1600 07/10/18 0930 07/09/18 1208       Oral Nutrition/Hydration Goal 1 (SLP)    Oral Nutrition/Hydration Goal 1, SLP Pt will tolerate least restrictive diet  -SA  -- Pt will tolerate least restrictive diet  -SA    Time Frame (Oral Nutrition/Hydration Goal 1, SLP) by discharge  -SA  -- by discharge  -SA    Progress/Outcomes (Oral Nutrition/Hydration Goal 1, SLP) good progress toward goal   advance to [place] mass[place]  -SA  --  --       Lingual Strengthening Goal 1 (SLP)    Activity (Lingual Strengthening Goal 1, SLP) increase lingual tone/sensation/control/coordination/movement  -SA  -- increase lingual tone/sensation/control/coordination/movement  -SA    Increase Lingual Tone/Sensation/Control/Coordination/Movement lingual movement exercises   x5; 60% w/ cues   -SA  --  --       Words/Phrases/Sentences Goal 1 (SLP)    Improve Ability to Comprehend Words/Phrases/Sentences Through: Goal 1 (SLP)  -- identify familiar objects;90%;independently (over 90% accuracy)  -SA  --    Time Frame (Identify Objects and Pictures Goal 1, SLP)  -- by discharge  -SA  --       Comprehend Questions Goal 1 (SLP)    Improve Ability to Comprehend Questions Goal 1 (SLP)  -- simple yes/no questions;complex yes/no questions;90%;independently (over 90% accuracy)  -SA  --    Comment (Comprehend Questions Goal 1, SLP)  -- verbally, with communication board, head nods  -SA  --       Follow Directions Goal 2 (SLP)    Improve Ability to Follow Directions Goal 1 (SLP)  -- 2 step commands;80%;with minimal cues (75-90%)  -SA  --       Word Retrieval Skills Goal 1 (SLP)    Improve Word Retrieval Skills By Goal 1 (SLP)  -- completing automatic speech task, counting;completing automatic speech task, days of the week;completing automatic speech task, months;confrontational naming task;responsive naming task;repeating words  -SA  --      User Key  (r) = Recorded By, (t) = Taken By, (c) = Cosigned By    Initials Name Provider Type    SA Ana Morelos  MS CCC-SLP Speech and Language Pathologist          EDUCATION  The patient has been educated in the following areas:   Dysphagia (Swallowing Impairment) Modified Diet Instruction.    SLP Recommendation and Plan                       Anticipated Dischage Disposition: anticipate therapy at next level of care        Predicted Duration Therapy Intervention (Days): until discharge       Plan of Care Reviewed With: patient  Plan of Care Review  Plan of Care Reviewed With: patient  Outcome Summary: Swallow treaatment to advance diet.  Able to masticate soft solids, but orala residue with soft solid requiring liquid wash.  REC advance diet to fork mashed.       SLP Outcome Measures (last 72 hours)      SLP Outcome Measures     Row Name 07/10/18 1600 07/10/18 0930 07/09/18 1700       SLP Outcome Measures    Outcome Measure Used? Adult NOMS  -SA Adult NOMS  -SA Adult NOMS  -SA       FCM Scores    Swallowing FCM Score 4  -SA  -- 4  -SA    Spoken Language Expression FCM Score  -- 2  -SA  --      User Key  (r) = Recorded By, (t) = Taken By, (c) = Cosigned By    Initials Name Effective Dates     Ana Morelos MS CCC-SLP 03/07/18 -              Time Calculation:         Time Calculation- SLP     Row Name 07/10/18 1621 07/10/18 1445          Time Calculation- SLP    SLP Start Time 1530  -SA 0930  -     SLP Stop Time 1615  -SA 1045  -     SLP Time Calculation (min) 45 min  - 75 min  -     SLP Received On  -- 07/10/18  -       User Key  (r) = Recorded By, (t) = Taken By, (c) = Cosigned By    Initials Name Provider Type     Ana Morelos MS CCC-SLP Speech and Language Pathologist          Therapy Charges for Today     Code Description Service Date Service Provider Modifiers Qty    00755021422 HC ST MOTION FLUORO EVAL SWALLOW 5 7/9/2018 Ana Morelos MS CCC-SLP GN 1    13133074064 HC ST EVAL SPEECH AND PROD W LANG  5 7/10/2018 Ana Morelos MS CCC-SLP GN 1    46525517469 HC ST TREATMENT SWALLOW 3 7/10/2018 Ana  MS RENNY Morelos-SLP GN 1                 Ana Morelos MS CCC-SLP  7/10/2018

## 2018-07-10 NOTE — PLAN OF CARE
Problem: Patient Care Overview  Goal: Plan of Care Review  Outcome: Ongoing (interventions implemented as appropriate)   07/10/18 1410   OTHER   Outcome Summary Pt tolerated treatment well this date. No c/o pain. Required CGA for ambulating 100ft w/ Rw. Improved yoav this date. PT will continue to address functional mobility deficits as tolerated.   Coping/Psychosocial   Plan of Care Reviewed With patient   Plan of Care Review   Progress improving

## 2018-07-10 NOTE — SIGNIFICANT NOTE
07/10/18 1430   Rehab Treatment   Discipline occupational therapist   Reason Treatment Not Performed unavailable for treatment  (with ccp and then with PT [id number])

## 2018-07-10 NOTE — PLAN OF CARE
Problem: Patient Care Overview  Goal: Plan of Care Review  Outcome: Ongoing (interventions implemented as appropriate)   07/10/18 1440   OTHER   Outcome Summary Speech/language evaluation completed. Pt demonstrated difficulties with following 2 step commands, and answering yes/no questions. Unable to name objects, perform responsive naming tasks or automatic speech tasks witihout visual/phonemic/phrase completion cues. REC skilled speech/language therapy to address deficits   Coping/Psychosocial   Plan of Care Reviewed With patient;family

## 2018-07-10 NOTE — CONSULTS
[facility]  Phone: [phone]  Florentino Hernandez MD      Subjective   LOS: 4 days     Thanks for this consultation.  88-year-old female who follows with Dr. Lorne Sage in our office.  She has COPD and is on inhalers at home.  She is also on supplemental oxygen with sleep and has been compliant up until this admission.  She has been previously advised sleep apnea testing but has declined it.  Her daughter feels that she may agree to it now.  She does have loud snoring at night.  Her medical history includes coronary artery disease chronic kidney disease and hypertension besides COPD.  She apparently had some wheezing when she initially came into the hospital.  This has cleared with breathing treatments and supplemental oxygen.  A repeat nocturnal oximetry test has again confirmed continued evidence of nocturnal hypoxia.  She is having speech impediment from her recent stroke and most of the history was obtained from her daughter.  Meggan Huynh  reports that she does not drink alcohol.,  reports that she has quit smoking. She does not have any smokeless tobacco history on file.     Past Hx:  has a past medical history of Ankle pain, right; Anxiety; CHF (congestive heart failure) (CMS/[facility]); Chronic renal disease, stage 3, moderately decreased glomerular filtration rate between 30-59 mL/min/1.73 square meter; COPD (chronic obstructive pulmonary disease) (CMS/[facility]); Coronary artery disease; Depression; Hyperlipidemia; Hypertension; Hypothyroidism; and Stroke (CMS/[facility]).  Surg Hx:  has no past surgical history on file.  FH: family history is not on file.  SH:  reports that she has quit smoking. She does not have any smokeless tobacco history on file. Drug use questions deferred to the physician. She reports that she does not drink alcohol.    Prescriptions Prior to Admission   Medication Sig Dispense Refill Last Dose   • azelastine (ASTELIN) 0.1 % nasal spray 1 spray into each nostril Daily. Mixed with  Flunisolide and Ipratropium   Taking   • Calcium 500-100 MG-UNIT chewable tablet Chew 1 tablet Daily Before Lunch.      • carvedilol (COREG) 3.125 MG tablet Take 6.25 mg by mouth Every Morning. Hold for blood pressure <100      • carvedilol (COREG) 3.125 MG tablet Take 3.125 mg by mouth Every Evening.      • cetirizine (ZyrTEC) 10 MG tablet Take 10 mg by mouth daily.   Taking   • Cholecalciferol (VITAMIN D) 2000 UNITS tablet Take 2,000 Units by mouth Every Morning.   Taking   • clopidogrel (PLAVIX) 75 MG tablet Take 75 mg by mouth Daily.      • escitalopram (LEXAPRO) 10 MG tablet Take 10 mg by mouth Every Night.   Taking   • flunisolide (NASALIDE) 25 MCG/ACT (0.025%) solution nasal spray Inhale 1 spray Daily. Mix with Azelastine and Ipratropium      • fluticasone-salmeterol (ADVAIR DISKUS) 250-50 MCG/DOSE DISKUS Inhale 1 puff Daily.      • furosemide (LASIX) 20 MG tablet Take 20 mg by mouth Every Morning.      • furosemide (LASIX) 20 MG tablet Take 10 mg by mouth Every Evening.      • ipratropium (ATROVENT) 0.03 % nasal spray 1 spray into each nostril Daily. Mix with Azelastine and Flunisolide      • levothyroxine (SYNTHROID, LEVOTHROID) 50 MCG tablet Take 50 mcg by mouth Daily.      • O2 (OXYGEN) Inhale 2 L/min Every Night.      • pravastatin (PRAVACHOL) 40 MG tablet Take 40 mg by mouth Every Night.      • Umeclidinium Bromide (INCRUSE ELLIPTA) 62.5 MCG/INH aerosol powder  Inhale 1 puff Daily. 2 each 0 Taking   • zafirlukast (ACCOLATE) 20 MG tablet Take 20 mg by mouth Daily As Needed.      • Fluticasone Furoate-Vilanterol (BREO ELLIPTA) 100-25 MCG/INH aerosol powder  One inhalation daily 1 each 0 Not Taking     Allergies   Allergen Reactions   • Codeine    • Hydrocodone    • Keflex [Cephalexin] Hives   • Levaquin [Levofloxacin In D5w]        Review of Systems   Constitutional: Negative for chills and fever.   HENT: Negative for congestion and sore throat.    Respiratory: Positive for cough, shortness of breath and  wheezing.    Cardiovascular: Negative for chest pain and leg swelling.   Gastrointestinal: Negative for nausea and vomiting.   Genitourinary: Negative for dysuria and hematuria.   Musculoskeletal: Negative for arthralgias and back pain.   Skin: Negative for pallor and rash.   Neurological: Positive for speech difficulty. Negative for headaches.   Psychiatric/Behavioral: Negative for agitation and confusion.     Vital Signs past 24hrs  BP range: BP: ()/(45-94) 125/70  Pulse range: Heart Rate:  [] 119  Resp rate range: Resp:  [16-24] 16  Temp range: Temp (24hrs), Av.2 °F (36.8 °C), Min:97.6 °F (36.4 °C), Max:98.9 °F (37.2 °C)    Oxygen range: SpO2:  [92 %-98 %] 97 %; Flow (L/min):  [1-2] 1;   Device (Oxygen Therapy): nasal cannula  78.5 kg (173 lb); Body mass index is 31.64 kg/m².  No intake/output data recorded.    Adult female who is in no acute distress.  She has speech impairment following her stroke.  Pupils equal and react to light.  Oropharynx class IV Mallampati airway large-size tongue.  No posterior pharyngeal discharge.  Nasopharynx without discharge septum midline.  JVP not elevated trachea midline thyroid not enlarged.  Lungs reveal bilateral air entry with a few conducted sounds.  Somewhat diminished air entry.  No wheezing heard.  Percussion note resonant chest expansion equal and no chest wall deformities or tenderness.  Heart examination S1-S2 present with regular rhythm.  No murmurs noted.  No edema lower extremities.  Abdomen is soft nontender bowel sounds present.  Patient is obese.  No liver spleen enlargement.  No peripheral cyanosis clubbing.  Moves all 4 extremities.  Speech impairment as above.  No cervical, axilla, inguinal adenopathy.    Results Review:    I have reviewed the laboratory and imaging data from current admission. My annotations are as noted in assessment and plan.    Medication Review:  I have reviewed the current MAR. My annotations are as noted in assessment  and plan.    Plan   PCCM Problems  Chronic nocturnal hypoxia  COPD currently without exacerbation  Suspected obstructive sleep apnea  Relevant Medical Diagnoses  Acute left MCA stroke  Coronary artery disease  Diastolic dysfunction of the heart    Plan of Treatment  This patient has chronic nocturnal hypoxia that is known previously.  She will continue supplemental oxygen.  She has previously been advised testing for obstructive sleep apnea but has declined.  Her daughters now want her to go ahead with this testing.  I have scheduled an in lab polysomnogram study to be done as an outpatient.  Once the results are available appropriate treatment with CPAP device will be suggested.    Her COPD is currently stable.  She has had some wheezing earlier this admission. Not presently heard. Check CxR for completion. Needs follow up with Dr. Lorne Sage.    Part of this note may be an electronic transcription/translation of spoken language to printed text using the Dragon Dictation System.

## 2018-07-11 NOTE — THERAPY TREATMENT NOTE
Acute Care - Occupational Therapy Treatment Note  [facility]     Patient Name: Meggan Huynh  : 1929  MRN: [id number]  Today's Date: 2018  Onset of Illness/Injury or Date of Surgery: 18  Date of Referral to OT: 18  Referring Physician: Dr. Bazan    Admit Date: 2018       ICD-10-CM ICD-9-CM   1. Cerebrovascular accident (CVA) due to occlusion of left middle cerebral artery (CMS/HCC) I63.512 434.91   2. Chronic systolic congestive heart failure (CMS/HCC) I50.22 428.22     428.0   3. Chronic bronchitis, unspecified chronic bronchitis type (CMS/HCC) J42 491.9   4. Oropharyngeal dysphagia R13.12 787.22   5. Generalized weakness R53.1 780.79   6. PRATIMA (obstructive sleep apnea) G47.33 327.23   7. Chronic obstructive pulmonary disease, unspecified COPD type (CMS/HCC) J44.9 496     Patient Active Problem List   Diagnosis   • Cerebrovascular accident (CVA) due to occlusion of left middle cerebral artery (CMS/HCC)   • Hypothyroidism   • Hypertension   • Hyperlipidemia   • Chronic renal disease, stage 3, moderately decreased glomerular filtration rate between 30-59 mL/min/1.73 square meter   • H/O CHF     Past Medical History:   Diagnosis Date   • Ankle pain, right    • Anxiety    • CHF (congestive heart failure) (CMS/HCC)     with preserved EF   • Chronic renal disease, stage 3, moderately decreased glomerular filtration rate between 30-59 mL/min/1.73 square meter    • COPD (chronic obstructive pulmonary disease) (CMS/HCC)    • Coronary artery disease    • Depression    • Hyperlipidemia    • Hypertension    • Hypothyroidism    • Stroke (CMS/HCC)      History reviewed. No pertinent surgical history.    Therapy Treatment          Rehabilitation Treatment Summary     Row Name 18 1520 18 1500          Treatment Time/Intention    Discipline occupational therapist  -SO physical therapist  -MA     Document Type therapy note (daily note)  -SO therapy note (daily note)  -MA     Subjective  Information no complaints  -SO no complaints  -MA     Mode of Treatment individual therapy;occupational therapy  -SO physical therapy  -MA     Patient/Family Observations Pt supine in bed asleep, easily aroused  -SO Daughters at bedside, no acute distress noted at rest  -MA     Care Plan Review  -- patient/other agree to care plan  -MA     Therapy Frequency (PT Clinical Impression)  -- daily  -MA     Patient Effort adequate  -SO good  -MA     Existing Precautions/Restrictions fall  -SO fall  -MA     Recorded by [SO] Addie Diaz, OTR 07/11/18 1526 [MA] Joceline Soto, PT 07/11/18 1516     Row Name 07/11/18 1500             Vital Signs    Pre SpO2 (%) 96  -MA      O2 Delivery Pre Treatment supplemental O2  -MA      Post SpO2 (%) 98  -MA      O2 Delivery Post Treatment supplemental O2  -MA      Recorded by [MA] Joceline Soto, PT 07/11/18 1516      Row Name 07/11/18 1520 07/11/18 1500          Cognitive Assessment/Intervention- PT/OT    Orientation Status (Cognition) oriented x 4   Expressive aphasia  -SO oriented x 4  -MA     Follows Commands (Cognition) follows one step commands;increased processing time needed;physical/tactile prompts required;repetition of directions required;verbal cues/prompting required  -SO follows one step commands;increased processing time needed;physical/tactile prompts required;repetition of directions required;verbal cues/prompting required  -MA     Safety Deficit (Cognitive) insight into deficits/self awareness  -SO moderate deficit;at risk behavior observed;awareness of need for assistance  -MA     Personal Safety Interventions  -- fall prevention program maintained;gait belt;nonskid shoes/slippers when out of bed  -MA     Recorded by [SO] Addie Diaz, OTR 07/11/18 1526 [MA] Joceline Soto, PT 07/11/18 1516     Row Name 07/11/18 1500             Safety Issues, Functional Mobility    Impairments Affecting Function (Mobility) strength;endurance/activity  tolerance;balance;shortness of breath  -MA      Recorded by [MA] Joceline Soto, PT 07/11/18 1516      Row Name 07/11/18 1520 07/11/18 1500          Bed Mobility Assessment/Treatment    Supine-Sit [place] (Bed Mobility) minimum assist (75% patient effort);moderate assist (50% patient effort)  -SO  --     Sit-Supine [place] (Bed Mobility) not tested  -SO  --     Comment (Bed Mobility)  -- NT- UIC upon PT arrival  -MA     Recorded by [SO] Addie Diaz, OTR 07/11/18 1526 [MA] Joceline Soto, PT 07/11/18 1516     Row Name 07/11/18 1520             Functional Mobility    Functional Mobility- Ind. Level contact guard assist  -SO      Functional Mobility- Device --   HHA  -SO      Functional Mobility- Comment Walks from bed to bathroom and back  -SO      Recorded by [SO] Addie Diaz, OTR 07/11/18 1526      Row Name 07/11/18 1520             Transfer Assessment/Treatment    Transfer Assessment/Treatment sit-stand transfer;stand-sit transfer;toilet transfer  -SO      Recorded by [SO] Addie Diaz, OTR 07/11/18 1526      Row Name 07/11/18 1520 07/11/18 1500          Sit-Stand Transfer    Sit-Stand [place] (Transfers) contact guard;verbal cues  -SO contact guard;verbal cues;nonverbal cues (demo/gesture)  -MA     Assistive Device (Sit-Stand Transfers)  -- walker, front-wheeled  -MA     Recorded by [SO] Addie Diaz, OTR 07/11/18 1526 [MA] Joceline Soto, PT 07/11/18 1516     Row Name 07/11/18 1520 07/11/18 1500          Stand-Sit Transfer    Stand-Sit [place] (Transfers) contact guard;verbal cues  -SO contact guard;verbal cues;nonverbal cues (demo/gesture)  -MA     Assistive Device (Stand-Sit Transfers)  -- walker, front-wheeled  -MA     Recorded by [SO] Addie Diaz, OTR 07/11/18 1526 [MA] Joceline Soto, PT 07/11/18 1516     Row Name 07/11/18 1520             Toilet Transfer    Type (Toilet Transfer) sit-stand;stand-sit  -SO       [place] Level (Toilet Transfer) contact guard;verbal cues  -SO      Assistive Device (Toilet Transfer) commode;grab bars/safety frame  -SO      Recorded by [SO] Addie Diaz, OTR 07/11/18 1526      Row Name 07/11/18 1500             Gait/Stairs Assessment/Training    Gait/Stairs Assessment/Training gait/ambulation independence  -MA      [place] Level (Gait) minimum assist (75% patient effort);verbal cues;nonverbal cues (demo/gesture)  -MA      Assistive Device (Gait) walker, front-wheeled  -MA      Distance in Feet (Gait) 110  -MA      Deviations/Abnormal Patterns (Gait) yoav decreased;gait speed decreased  -MA      Bilateral Gait Deviations forward flexed posture  -MA      Comment (Gait/Stairs) Distance limited 2' to pain  -MA      Recorded by [MA] Joceline Soto, PT 07/11/18 1516      Row Name 07/11/18 1520             ADL Assessment/Intervention    BADL Assessment/Intervention toileting  -SO      Recorded by [SO] Addie Diaz, OTR 07/11/18 1526      Row Name 07/11/18 1520             Toileting Assessment/Training    [place] Level (Toileting) toileting skills;contact guard assist;minimum assist (75% patient effort);verbal cues  -SO      Assistive Devices (Toileting) grab bar/safety frame  -SO      Toileting Position supported sitting;supported standing  -SO      Comment (Toileting) A with brief  -SO      Recorded by [SO] Addie Diaz, OTR 07/11/18 1526      Row Name 07/11/18 1520             Motor Skills Assessment/Interventions    Additional Documentation Therapeutic Exercise Interventions (Group);Therapeutic Exercise (Group)  -SO      Recorded by [SO] Addie Diaz, OTR 07/11/18 1526      Row Name 07/11/18 1520             Therapeutic Exercise    Comment (Therapeutic Exercise) Shoulder fl/ext and protraction/retraction 10x2, RUE fatigues  -SO      Recorded by [SO] Addie Diaz, OTR 07/11/18 1526      Row Name 07/11/18 1520 07/11/18 1500           Positioning and Restraints    Pre-Treatment Position in bed  -SO in bed  -MA     Post Treatment Position chair  -SO chair  -MA     In Chair sitting;call light within reach;encouraged to call for assist  -SO notified nsg;sitting;call light within reach;encouraged to call for assist;with family/caregiver;legs elevated  -MA     Recorded by [SO] Addie Diaz, OTR 07/11/18 1526 [MA] Joceline Soto, PT 07/11/18 1516     Row Name 07/11/18 1520 07/11/18 1500          Pain Scale: Numbers Pre/Post-Treatment    Pain Scale: Numbers, Pretreatment 0/10 - no pain  -SO 0/10 - no pain  -MA     Pain Scale: Numbers, Post-Treatment  -- 0/10 - no pain  -MA     Recorded by [SO] Addie Diaz, OTR 07/11/18 1526 [MA] Joceline Soto, PT 07/11/18 1516     Row Name 07/11/18 1500             Plan of Care Review    Plan of Care Reviewed With family  -MA      Recorded by [MA] Joceline Soto, PT 07/11/18 1516      Row Name 07/11/18 1500             Outcome Summary/Treatment Plan (PT)    Daily Summary of Progress (PT) progress toward functional goals is good  -MA      Anticipated Discharge Disposition (PT) skilled nursing facility  -MA      Recorded by [MA] Joceline Soto, PT 07/11/18 1516        User Key  (r) = Recorded By, (t) = Taken By, (c) = Cosigned By    Initials Name Effective Dates Discipline    SO Addie Diaz, OTR 04/13/15 -  OT    MA Joceline Soto, PT 04/03/18 -  PT               Occupational Therapy Education     Title: PT OT SLP Therapies (Active)     Topic: Occupational Therapy (Active)     Point: ADL training (Done)     Description: Instruct learner(s) on proper safety adaptation and remediation techniques during self care or transfers.   Instruct in proper use of assistive devices.   Learning Progress Summary     Learner Status Readiness Method Response Comment Documented by    Patient Done Acceptance E VU,NR edcuated on benefits and purpose of OT during CVA recovery AF 07/09/18  1218    Family Done Acceptance E VU,NR edcuated on benefits and purpose of OT during CVA recovery AF 07/09/18 1218                      User Key     Initials Effective Dates Name Provider Type Discipline     04/03/18 -  Meagan Ocampo OTMARKOS Occupational Therapist OT                OT Recommendation and Plan     Plan of Care Review  Plan of Care Reviewed With: patient  Plan of Care Reviewed With: patient  Outcome Summary: UE strength and endurance improving.        Outcome Measures     Row Name 07/11/18 1527 07/11/18 1500 07/10/18 1400       How much help from another person do you currently need...    Turning from your back to your side while in flat bed without using bedrails?  -- 4  -MA 4  -SM    Moving from lying on back to sitting on the side of a flat bed without bedrails?  -- 3  -MA 3  -SM    Moving to and from a bed to a chair (including a wheelchair)?  -- 3  -MA 3  -SM    Standing up from a chair using your arms (e.g., wheelchair, bedside chair)?  -- 3  -MA 3  -SM    Climbing 3-5 steps with a railing?  -- 2  -MA 3  -SM    To walk in hospital room?  -- 3  -MA 3  -SM    AM-PAC 6 Clicks Score  -- 18  -MA 19  -SM       How much help from another is currently needed...    Putting on and taking off regular lower body clothing? 2  -SO  --  --    Bathing (including washing, rinsing, and drying) 2  -SO  --  --    Toileting (which includes using toilet bed pan or urinal) 3  -SO  --  --    Putting on and taking off regular upper body clothing 3  -SO  --  --    Taking care of personal grooming (such as brushing teeth) 3  -SO  --  --    Eating meals 3  -SO  --  --    Score 16  -SO  --  --       Functional Assessment    Outcome Measure Options AM-PAC 6 Clicks Daily Activity (OT)  -SO AM-PAC 6 Clicks Basic Mobility (PT)  -MA AM-PAC 6 Clicks Basic Mobility (PT)  -SM    Row Name 07/09/18 1219 07/09/18 1100          How much help from another person do you currently need...    Turning from your back to your side while in  flat bed without using bedrails?  -- 4  -SM     Moving from lying on back to sitting on the side of a flat bed without bedrails?  -- 3  -SM     Moving to and from a bed to a chair (including a wheelchair)?  -- 3  -SM     Standing up from a chair using your arms (e.g., wheelchair, bedside chair)?  -- 3  -SM     Climbing 3-5 steps with a railing?  -- 3  -SM     To walk in hospital room?  -- 3  -SM     AM-PAC 6 Clicks Score  -- 19  -SM        How much help from another is currently needed...    Putting on and taking off regular lower body clothing? 2  -AF  --     Bathing (including washing, rinsing, and drying) 2  -AF  --     Toileting (which includes using toilet bed pan or urinal) 3  -AF  --     Putting on and taking off regular upper body clothing 3  -AF  --     Taking care of personal grooming (such as brushing teeth) 3  -AF  --     Eating meals 3  -AF  --     Score 16  -AF  --        Functional Assessment    Outcome Measure Options AM-PAC 6 Clicks Daily Activity (OT)  -AF AM-PAC 6 Clicks Basic Mobility (PT)  -SM       User Key  (r) = Recorded By, (t) = Taken By, (c) = Cosigned By    Initials Name Provider Type    SO Addie Diaz, OTR Occupational Therapist    AF Meagan Ocampo, OTR Occupational Therapist    SM Claritza Walker, PTA Physical Therapy Assistant    KIRBY Soto, PT Physical Therapist           Time Calculation:         Time Calculation- OT     Row Name 07/11/18 1527             Time Calculation- OT    OT Start Time 1415  -SO      OT Stop Time 1438  -SO      OT Time Calculation (min) 23 min  -SO      Total Timed Code Minutes- OT 23 minute(s)  -SO      OT Received On 07/11/18  -SO        User Key  (r) = Recorded By, (t) = Taken By, (c) = Cosigned By    Initials Name Provider Type    SO Addie Diaz, OTR Occupational Therapist           Therapy Suggested Charges     Code   Minutes Charges    None           Therapy Charges for Today     Code Description Service Date  Service Provider Modifiers Qty    64861436097 HC OT THER PROC EA 15 MIN 7/11/2018 Addie Diaz OTR GO 1    06928492553 HC OT SELF CARE/MGMT/TRAIN EA 15 MIN 7/11/2018 Addie Diaz OTR GO 1               Addie Diaz, OTR  7/11/2018

## 2018-07-11 NOTE — PLAN OF CARE
Problem: Patient Care Overview  Goal: Plan of Care Review   07/11/18 1526   OTHER   Outcome Summary UE strength and endurance improving.   Coping/Psychosocial   Plan of Care Reviewed With patient   Plan of Care Review   Progress improving

## 2018-07-11 NOTE — PLAN OF CARE
Problem: Fall Risk (Adult)  Goal: Identify Related Risk Factors and Signs and Symptoms  Outcome: Ongoing (interventions implemented as appropriate)    Goal: Absence of Fall  Outcome: Ongoing (interventions implemented as appropriate)      Problem: Patient Care Overview  Goal: Plan of Care Review  Outcome: Ongoing (interventions implemented as appropriate)   07/10/18 1410 07/10/18 1619 07/11/18 0315   OTHER   Outcome Summary --  --  Pt. remains Alert during shift. NIH ranging 7-8. Tolerating meds crushed in applesauce. Up with 1 assist with gait belt .    Coping/Psychosocial   Plan of Care Reviewed With --  patient --    Plan of Care Review   Progress improving --  --      Goal: Individualization and Mutuality  Outcome: Ongoing (interventions implemented as appropriate)    Goal: Discharge Needs Assessment  Outcome: Ongoing (interventions implemented as appropriate)    Goal: Interprofessional Rounds/Family Conf  Outcome: Ongoing (interventions implemented as appropriate)      Problem: Skin Injury Risk (Adult)  Goal: Identify Related Risk Factors and Signs and Symptoms  Outcome: Ongoing (interventions implemented as appropriate)    Goal: Skin Health and Integrity  Outcome: Ongoing (interventions implemented as appropriate)

## 2018-07-11 NOTE — PLAN OF CARE
Problem: Fall Risk (Adult)  Goal: Identify Related Risk Factors and Signs and Symptoms  Outcome: Ongoing (interventions implemented as appropriate)      Problem: Patient Care Overview  Goal: Plan of Care Review  Outcome: Ongoing (interventions implemented as appropriate)   07/11/18 1955   OTHER   Outcome Summary Pt hr still varies. Started amioradone. Pt ambulates in room and to restroom. Uses non-verbal call light, due to dysphasia. Plan to HI tomorrow to [place] for rehab.    Coping/Psychosocial   Plan of Care Reviewed With patient   Plan of Care Review   Progress improving       Problem: Skin Injury Risk (Adult)  Goal: Identify Related Risk Factors and Signs and Symptoms  Outcome: Ongoing (interventions implemented as appropriate)    Goal: Skin Health and Integrity  Outcome: Ongoing (interventions implemented as appropriate)

## 2018-07-11 NOTE — PROGRESS NOTES
Name: Meggan Huynh ADMIT: 2018   : 1929  PCP: Devan Snow MD    MRN: [id number] LOS: 5 days   AGE/SEX: 88 y.o. female  ROOM: [facility]   Subjective   Slept well overnight. No CP SOA NVD reported. Daughter noticed periods of gurgling and snoring followed by deep breaths while she was sleeping. Aphasia present.    Objective   Vital Signs  Temp:  [98 °F (36.7 °C)-98.9 °F (37.2 °C)] 98.3 °F (36.8 °C)  Heart Rate:  [] 118  Resp:  [15-18] 16  BP: ()/(45-78) 143/70  SpO2:  [90 %-98 %] 94 %  on  Flow (L/min):  [1-2] 2;   Device (Oxygen Therapy): nasal cannula  Body mass index is 31.64 kg/m².    Physical Exam   Constitutional: She appears well-developed. No distress.   HENT:   Head: Normocephalic and atraumatic.   Eyes: Conjunctivae are normal. Pupils are equal, round, and reactive to light.   Neck: Neck supple.   Cardiovascular: Regular rhythm and intact distal pulses.  Tachycardia present.    Pulmonary/Chest: Effort normal. No stridor. She has wheezes (mild end expiratoy bilaterally).   Abdominal: Soft. She exhibits no distension. There is no tenderness.   Musculoskeletal: She exhibits no edema or tenderness.   Neurological: She is alert.   Strength equal bilaterally. Expressive aphasic speech.   Skin: Skin is warm and dry. She is not diaphoretic.   Psychiatric: She has a normal mood and affect. Her behavior is normal.   Nursing note and vitals reviewed.      Results Review:       I reviewed the patient's new clinical results.     I reviewed telemetry/EKG results, sinus tachycardia with chronic LBBB        Results from last 7 days  Lab Units 07/10/18  0534 18  0547 18  0509 18  0618   WBC 10*3/mm3 9.01 11.40* 9.01 9.96   HEMOGLOBIN g/dL 10.8* 11.0* 10.8* 11.9   PLATELETS 10*3/mm3 141 182 158 169     Results from last 7 days  Lab Units 07/10/18  0535 18  0742 18  0509 18  0618   SODIUM mmol/L 148* 148* 144 141   POTASSIUM mmol/L 3.4* 3.6 3.5 3.5    CHLORIDE mmol/L 108* 107 107 103   CO2 mmol/L 26.4 26.7 24.8 24.3   BUN mg/dL 19 15 17 20   CREATININE mg/dL 0.96 0.98 1.09* 1.02*   GLUCOSE mg/dL 113* 141* 134* 109*   Estimated Creatinine Clearance: 39.3 mL/min (by C-G formula based on SCr of 0.96 mg/dL).  Results from last 7 days  Lab Units 07/10/18  0535 07/09/18  0742 07/08/18  0509 07/07/18  0618 07/06/18  1255   CALCIUM mg/dL 7.9* 8.1* 8.0* 7.6* 8.1*   ALBUMIN g/dL  --   --   --   --  4.20         aspirin 325 mg Oral Daily   Or      aspirin 300 mg Rectal Daily   atorvastatin 80 mg Oral Nightly   azelastine 1 spray Each Nare Daily   budesonide-formoterol 2 puff Inhalation BID - RT   calcium carbonate 2 tablet Oral Daily   carvedilol 12.5 mg Oral BID With Meals   cetirizine 5 mg Oral Daily   cholecalciferol 2,000 Units Oral QAM   clopidogrel 75 mg Oral Daily   escitalopram 10 mg Oral Nightly   fluticasone 1 spray Each Nare Daily   furosemide 20 mg Oral QAM   ipratropium 1 spray Each Nare Daily   levothyroxine 50 mcg Oral Daily   O2 2 L/min Inhalation Nightly   tiotropium 1 capsule Inhalation Daily - RT       Pharmacy Consult    Diet Dysphagia; III - Pureed With Some Mashed; Thin      Assessment/Plan      Active Hospital Problems    Diagnosis Date Noted   • Cerebrovascular accident (CVA) due to occlusion of left middle cerebral artery (CMS/HCC) [I63.512] 07/06/2018   • Hypothyroidism [E03.9] 07/06/2018   • Hypertension [I10] 07/06/2018   • Hyperlipidemia [E78.5] 07/06/2018   • Chronic renal disease, stage 3, moderately decreased glomerular filtration rate between 30-59 mL/min/1.73 square meter [N18.3] 07/06/2018   • H/O CHF [Z86.79] 07/06/2018      Resolved Hospital Problems    Diagnosis Date Noted Date Resolved   No resolved problems to display.     - CVA: Large left MCA stroke. Continue ASA/Plavix/Statin. TTE with preserved EF, no PFO, and RODERICK. Holter pending with possibility of AFib. Difficulty with self care and communication will require 24/7 care.  Cardiology, Neurology following.  - Chronic Diastolic HF: Preserved EF. Continue Lasix/Coreg.  - CKD3: Stable. Repeat BMP pending.  - Dysphagia: SLP following. Modified diet.  - Nighttime Hypoxia: PRATIMA likely. Nightime O2 as needed. Sleep medicine following.  - Tachycardia: Increased coreg yesterday. Will monitor.  - HTN  - COPD: Symbicort, Spiriva. Albuterol PRN.  - Disposition: SNF/possibly today or tomorrow    Meir Arriaga MD  [facility]  07/11/18  9:08 AM

## 2018-07-11 NOTE — PROGRESS NOTES
Continued Stay Note  [facility]     Patient Name: Meggan Huynh  MRN: [id number]  Today's Date: 7/11/2018    Admit Date: 7/6/2018          Discharge Plan     Row Name 07/11/18 1218       Plan    Plan Comments Family has decided on [facility].  They do have a bed for patient today and tomorrow.  Will follow.      Row Name 07/11/18 1038       Plan    Plan Comments spoke with dgt Bing Paredes and Sakshi Jacinto.  Both [facility] and [facility] have a bed for patient today.  They are to decide which facility they want and call CCP.      Row Name 07/11/18 1032       Plan    Plan Skilled care    Patient/Family in Agreement with Plan yes              Discharge Codes    No documentation.           Yue Adkins RN

## 2018-07-11 NOTE — PROGRESS NOTES
"DOS: 2018  NAME: Meggan Huynh   : 1929  PCP: Devan Snow MD  Chief Complaint   Patient presents with   • Altered Mental Status     since last night at    • Unable to speak     CC: Stroke    Subjective: No new events overnight per RN. Patient continues with speech difficulty Daughter at bedside and also other daughter via phone.     Interval History  History taken from: patient chart family RN    Objective:  Vital signs: /70   Pulse 118   Temp 98.8 °F (37.1 °C) (Oral)   Resp 18   Ht 157.5 cm (62\")   Wt 78.5 kg (173 lb)   SpO2 97%   BMI 31.64 kg/m²       Physical Exam:  GENERAL: NAD  HEENT: Normocephalic, atraumatic   COR: Sinus Tach  Resp: Even and unlabored  Extremities: No signs of distal embolization. TEDs and SCDs in place.    Neurological:   MS: Drowsy but easily arousable with verbal stimulus. . Global aphasia (expressive > receptive). She nods appropriately at times. Can count to 8. States \"Thumb\" correctly. Followed most commands with mimicking. No neglect  CN: Pupils equal, reactive. No obvious VF deficit on gross testing. Flattening of right nasolabial fold.   Motor: Nearly normal strength and tone on the left, at least 4/5. RUE strength 3-4/5 with pronator drift. RLE strength at least 3-4/5.  Sensory: Intact to noxious stimulus.   Coordination: Not following complex commands  Patient examined: Changes noted.     Results Review:     I reviewed the patient's new clinical results.    Current Medications:  Scheduled Medications:    amiodarone 200 mg Oral Q24H   amiodarone 150 mg Intravenous Once   aspirin 325 mg Oral Daily   Or      aspirin 300 mg Rectal Daily   atorvastatin 80 mg Oral Nightly   azelastine 1 spray Each Nare Daily   budesonide-formoterol 2 puff Inhalation BID - RT   calcium carbonate 2 tablet Oral Daily   carvedilol 12.5 mg Oral BID With Meals   cetirizine 5 mg Oral Daily   cholecalciferol 2,000 Units Oral QAM   clopidogrel 75 mg Oral Daily   escitalopram " 10 mg Oral Nightly   fluticasone 1 spray Each Nare Daily   furosemide 20 mg Oral QAM   ipratropium 1 spray Each Nare Daily   levothyroxine 50 mcg Oral Daily   O2 2 L/min Inhalation Nightly   tiotropium 1 capsule Inhalation Daily - RT     Infusions:     Pharmacy Consult      PRN Medications:  •  albuterol  •  bisacodyl  •  calcium carbonate  •  magnesium hydroxide  •  ondansetron **OR** ondansetron ODT **OR** ondansetron  •  Pharmacy Consult  •  potassium chloride **OR** potassium chloride **OR** potassium chloride  •  sodium chloride  •  Insert peripheral IV **AND** sodium chloride    Medications Reviewed    Laboratory results:  Lab Results   Component Value Date    GLUCOSE 136 (H) 07/11/2018    CALCIUM 8.6 07/11/2018     07/11/2018    K 4.0 07/11/2018    CO2 25.7 07/11/2018     07/11/2018    BUN 23 07/11/2018    CREATININE 1.14 (H) 07/11/2018    EGFRIFAFRI  09/23/2016      Comment:      <15 Indicative of kidney failure.    EGFRIFNONA 45 (L) 07/11/2018    BCR 20.2 07/11/2018    ANIONGAP 13.3 07/11/2018     Lab Results   Component Value Date    WBC 9.01 07/10/2018    HGB 10.8 (L) 07/10/2018    HCT 34.8 (L) 07/10/2018    MCV 90.4 07/10/2018     07/10/2018        Results from last 7 days  Lab Units 07/07/18  0618   CHOLESTEROL mg/dL 107     Lab Results   Component Value Date    INR 1.11 (H) 07/06/2018    PROTIME 14.1 07/06/2018     Lab Results   Component Value Date    TSH 2.980 07/03/2018     No components found for: LDLCALC  Lab Results   Component Value Date    HGBA1C 5.90 (H) 07/07/2018     Lab Results   Component Value Date    CHOL 107 07/07/2018    TRIG 106 07/07/2018    HDL 49 07/07/2018    LDL 37 07/07/2018     Lab Results   Component Value Date    ZNXVYNCK98 485 07/11/2017     Review and interpretation of imaging:    Impression:  Ms Huynh is an 89 yo female with PMH stroke (on plavix), HTN, HLD, anxiety/depression, hypothyroidism, CHF, CKD, CAD, COPD, who presented with speech difficulty and  right facial droop. Symptoms were present upon waking. Pt had recent admission for uncontrolled HTN.  Her imaging showed a large acute left MCA stroke and chronic posterior LMCA stroke. Her vessel imaging showed extensive calcification and plaque in proximal vessels with left subclavian stenosis, left vertebral stenosis with likely back filling from basilar to post-PICA vert, R CCA 70% narrowing, R ICA 70% stenosis, Distal L M1 acute embolus and CTP showed perfusion mismatch. She was out of the window for tPA and per JADYN was not thought to be a candidate for embolectomy.     Had long discussion with family regarding anticoagulation for presumed afib, cardioembolic source of stroke.  Cardiology at bedside and no documented evidence of afib thus far. She is currently wearing a Holter Monitor.I discussed with Dr. Timmons and recommend getting a repeat CT head on the 16th and if no evidence of bleed, recommend starting Eliquis.  On exam, she continues with significant aphasia but some improvement. For now, continue ASA, Plavix and Lipitor. Continue neurochecks, PT/OT/ST and CCP for rehab placement. Will follow results of CT. Okay to d/c to rehab in the interim from neurology standpoint. Please call with questions or concerns.     Plan:  Repeat head CT on Monday, 7/16/18 (order placed)  If above, negative for hemorrhage,    Recommend starting anticoagulation  For now, continue ASA and Plavix  Lipitor 80 mg  Hydrate  Neurochecks  Non-pharmacological DVT prophylaxis  EKG Tele  PT/OT/ST  Stroke Education  Secondary stroke prevention:    Blood pressure control to <130/80   Goal LDL <70-recommend high dose statins-    Serum glucose < 140        I have discussed the above with Dr. Timmons, RN, patient and family.  Angelia Lopez, APRN  07/11/18  11:45 AM      Active Problems:    Cerebrovascular accident (CVA) due to occlusion of left middle cerebral artery (CMS/HCC)    Hypothyroidism    Hypertension    Hyperlipidemia     Chronic renal disease, stage 3, moderately decreased glomerular filtration rate between 30-59 mL/min/1.73 square meter    H/O CHF

## 2018-07-11 NOTE — PROGRESS NOTES
"Patient Care Team:  Devan Snow Jr., MD as PCP - General (Internal Medicine)  Devan Snow Jr., MD as PCP - Claims Attributed  Jennifer Frank RN as Care Coordinator (Population Health)    Chief Complaint: Follow-up tachycardia    Interval History:   Patient with SVT.  Ventricular rate 110-130 bpm.  No new complaints.    Objective   Vital Signs  Temp:  [98 °F (36.7 °C)-98.9 °F (37.2 °C)] 98.8 °F (37.1 °C)  Heart Rate:  [] 118  Resp:  [15-18] 18  BP: ()/(45-78) 143/70  No intake or output data in the 24 hours ending 07/11/18 1008  Flowsheet Rows      First Filed Value   Admission Height  157.5 cm (62\") Documented at 07/06/2018 1227   Admission Weight  78.8 kg (173 lb 11.2 oz) Documented at 07/06/2018 1227          General Appearance:    Alert, cooperative, in no acute distress   Head:    Normocephalic, without obvious abnormality, atraumatic       Neck:   No adenopathy, supple, no thyromegaly, no carotid bruit, no    JVD   Lungs:     Clear to auscultation bilaterally, no wheezes, rales, or     rhonchi    Heart:    Tachycardic rate, regular rhythm,  No murmur, rub, or gallop   Chest Wall:    No abnormalities observed   Abdomen:     Normal bowel sounds, soft, non-tender, non-distended,            no rebound tenderness   Extremities:   No cyanosis, clubbing, or edema   Pulses:   Pulses palpable and equal bilaterally   Skin:   No bleeding or rash               amiodarone 200 mg Oral Q24H   amiodarone 150 mg Intravenous Once   aspirin 325 mg Oral Daily   Or      aspirin 300 mg Rectal Daily   atorvastatin 80 mg Oral Nightly   azelastine 1 spray Each Nare Daily   budesonide-formoterol 2 puff Inhalation BID - RT   calcium carbonate 2 tablet Oral Daily   carvedilol 12.5 mg Oral BID With Meals   cetirizine 5 mg Oral Daily   cholecalciferol 2,000 Units Oral QAM   clopidogrel 75 mg Oral Daily   escitalopram 10 mg Oral Nightly   fluticasone 1 spray Each Nare Daily   furosemide 20 mg Oral QAM   ipratropium 1 " spray Each Nare Daily   levothyroxine 50 mcg Oral Daily   O2 2 L/min Inhalation Nightly   tiotropium 1 capsule Inhalation Daily - RT         Pharmacy Consult        Results Review:      Results from last 7 days  Lab Units 07/11/18  0838   SODIUM mmol/L 143   POTASSIUM mmol/L 4.0   CHLORIDE mmol/L 104   CO2 mmol/L 25.7   BUN mg/dL 23   CREATININE mg/dL 1.14*   GLUCOSE mg/dL 136*   CALCIUM mg/dL 8.6           Results from last 7 days  Lab Units 07/10/18  0534   WBC 10*3/mm3 9.01   HEMOGLOBIN g/dL 10.8*   HEMATOCRIT % 34.8*   PLATELETS 10*3/mm3 141       Results from last 7 days  Lab Units 07/06/18  1255   INR  1.11*       Results from last 7 days  Lab Units 07/07/18  0618   CHOLESTEROL mg/dL 107           Results from last 7 days  Lab Units 07/07/18  0618   CHOLESTEROL mg/dL 107   TRIGLYCERIDES mg/dL 106   HDL CHOL mg/dL 49   LDL CHOL mg/dL 37     @LABRCNT(bnp)@  I reviewed the patient's new clinical results.  I personally viewed and interpreted the patient's EKG/Telemetry data        Assessment/Plan   1.  SVT, paroxysmal  2.  Chronic diastolic heart failure  3.  CVA  4.  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  This is not atrial fibrillation.  This does not require anticoagulation  -I agree with increasing carvedilol therapy.  If this does not work we will have to put the patient on an antiarrhythmic.

## 2018-07-11 NOTE — PLAN OF CARE
Problem: Patient Care Overview  Goal: Plan of Care Review   07/11/18 1517   OTHER   Outcome Summary Improved tolerance to activity through level of independence with transfers and progression with ambulation distance. Cues for safety awareness. Will continue to progress as able.   Coping/Psychosocial   Plan of Care Reviewed With patient;family   Plan of Care Review   Progress improving

## 2018-07-11 NOTE — PROGRESS NOTES
"Patient Care Team:  Devan Snow Jr., MD as PCP - General (Internal Medicine)  Devan Snow Jr., MD as PCP - Claims Attributed  Jennifer Frank RN as Care Coordinator (Population Health)    Chief Complaint: Follow-up tachycardia    Interval History:   Patient with paroxysmal SVT.  Currently in sinus.    Objective   Vital Signs  Temp:  [98 °F (36.7 °C)-98.9 °F (37.2 °C)] 98.8 °F (37.1 °C)  Heart Rate:  [] 118  Resp:  [15-18] 18  BP: ()/(45-78) 143/70  No intake or output data in the 24 hours ending 07/11/18 1009  Flowsheet Rows      First Filed Value   Admission Height  157.5 cm (62\") Documented at 07/06/2018 1227   Admission Weight  78.8 kg (173 lb 11.2 oz) Documented at 07/06/2018 1227          General Appearance:    Alert, cooperative, in no acute distress   Head:    Normocephalic, without obvious abnormality, atraumatic       Neck:   No adenopathy, supple, no thyromegaly, no carotid bruit, no    JVD   Lungs:     Clear to auscultation bilaterally, no wheezes, rales, or     rhonchi    Heart:    Normal rate rate, regular rhythm,  No murmur, rub, or gallop   Chest Wall:    No abnormalities observed   Abdomen:     Normal bowel sounds, soft, non-tender, non-distended,            no rebound tenderness   Extremities:   No cyanosis, clubbing, or edema   Pulses:   Pulses palpable and equal bilaterally   Skin:   No bleeding or rash               amiodarone 200 mg Oral Q24H   amiodarone 150 mg Intravenous Once   aspirin 325 mg Oral Daily   Or      aspirin 300 mg Rectal Daily   atorvastatin 80 mg Oral Nightly   azelastine 1 spray Each Nare Daily   budesonide-formoterol 2 puff Inhalation BID - RT   calcium carbonate 2 tablet Oral Daily   carvedilol 12.5 mg Oral BID With Meals   cetirizine 5 mg Oral Daily   cholecalciferol 2,000 Units Oral QAM   clopidogrel 75 mg Oral Daily   escitalopram 10 mg Oral Nightly   fluticasone 1 spray Each Nare Daily   furosemide 20 mg Oral QAM   ipratropium 1 spray Each Nare " Daily   levothyroxine 50 mcg Oral Daily   O2 2 L/min Inhalation Nightly   tiotropium 1 capsule Inhalation Daily - RT         Pharmacy Consult        Results Review:      Results from last 7 days  Lab Units 07/11/18  0838   SODIUM mmol/L 143   POTASSIUM mmol/L 4.0   CHLORIDE mmol/L 104   CO2 mmol/L 25.7   BUN mg/dL 23   CREATININE mg/dL 1.14*   GLUCOSE mg/dL 136*   CALCIUM mg/dL 8.6           Results from last 7 days  Lab Units 07/10/18  0534   WBC 10*3/mm3 9.01   HEMOGLOBIN g/dL 10.8*   HEMATOCRIT % 34.8*   PLATELETS 10*3/mm3 141       Results from last 7 days  Lab Units 07/06/18  1255   INR  1.11*       Results from last 7 days  Lab Units 07/07/18  0618   CHOLESTEROL mg/dL 107           Results from last 7 days  Lab Units 07/07/18  0618   CHOLESTEROL mg/dL 107   TRIGLYCERIDES mg/dL 106   HDL CHOL mg/dL 49   LDL CHOL mg/dL 37     @LABRCNT(bnp)@  I reviewed the patient's new clinical results.  I personally viewed and interpreted the patient's EKG/Telemetry data        Assessment/Plan   1.  SVT, paroxysmal  2.  Chronic diastolic heart failure  3.  CVA  4.  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   -Continue carvedilol at the current dose.  Start amiodarone 200 mg daily.  -No evidence of atrial fibrillation.  Discussed with neurology.  Patient had a large vessels CVA that appears to be embolic and multiple risk factors for A. fib.  They're going to start anticoagulation.  I think this is reasonable  -I will sign off.  -Patient needs follow-up with me in 4 weeks

## 2018-07-11 NOTE — THERAPY TREATMENT NOTE
Acute Care - Physical Therapy Treatment Note  [facility]     Patient Name: Meggan Huynh  : 1929  MRN: [id number]  Today's Date: 2018  Onset of Illness/Injury or Date of Surgery: 18  Date of Referral to PT: 18  Referring Physician: Dr. Bazan    Admit Date: 2018    Visit Dx:    ICD-10-CM ICD-9-CM   1. Cerebrovascular accident (CVA) due to occlusion of left middle cerebral artery (CMS/HCC) I63.512 434.91   2. Chronic systolic congestive heart failure (CMS/[facility]) I50.22 428.22     428.0   3. Chronic bronchitis, unspecified chronic bronchitis type (CMS/[facility]) J42 491.9   4. Oropharyngeal dysphagia R13.12 787.22   5. Generalized weakness R53.1 780.79   6. PRATIMA (obstructive sleep apnea) G47.33 327.23   7. Chronic obstructive pulmonary disease, unspecified COPD type (CMS/[facility]) J44.9 496     Patient Active Problem List   Diagnosis   • Cerebrovascular accident (CVA) due to occlusion of left middle cerebral artery (CMS/HCC)   • Hypothyroidism   • Hypertension   • Hyperlipidemia   • Chronic renal disease, stage 3, moderately decreased glomerular filtration rate between 30-59 mL/min/1.73 square meter   • H/O CHF       Therapy Treatment          Rehabilitation Treatment Summary     Row Name 18 1500             Treatment Time/Intention    Discipline physical therapist  -MA      Document Type therapy note (daily note)  -MA      Subjective Information no complaints  -MA      Mode of Treatment physical therapy  -MA      Patient/Family Observations Daughters at bedside, no acute distress noted at rest  -MA      Care Plan Review patient/other agree to care plan  -MA      Therapy Frequency (PT Clinical Impression) daily  -MA      Patient Effort good  -MA      Existing Precautions/Restrictions fall  -MA      Recorded by [MA] Joceline Soot, PT 18 1516      Row Name 18 1500             Vital Signs    Pre SpO2 (%) 96  -MA      O2 Delivery Pre Treatment supplemental O2  -MA      Post SpO2  (%) 98  -MA      O2 Delivery Post Treatment supplemental O2  -MA      Recorded by [MA] Joceline Soto, PT 07/11/18 1516      Row Name 07/11/18 1500             Cognitive Assessment/Intervention- PT/OT    Orientation Status (Cognition) oriented x 4  -MA      Follows Commands (Cognition) follows one step commands;increased processing time needed;physical/tactile prompts required;repetition of directions required;verbal cues/prompting required  -MA      Safety Deficit (Cognitive) moderate deficit;at risk behavior observed;awareness of need for assistance  -MA      Personal Safety Interventions fall prevention program maintained;gait belt;nonskid shoes/slippers when out of bed  -MA      Recorded by [MA] Joceline Soto, PT 07/11/18 1516      Row Name 07/11/18 1500             Safety Issues, Functional Mobility    Impairments Affecting Function (Mobility) strength;endurance/activity tolerance;balance;shortness of breath  -MA      Recorded by [MA] Joceline Soto, PT 07/11/18 1516      Row Name 07/11/18 1500             Bed Mobility Assessment/Treatment    Comment (Bed Mobility) NT- UIC upon PT arrival  -MA      Recorded by [MA] Joceline Soto, PT 07/11/18 1516      Row Name 07/11/18 1500             Sit-Stand Transfer    Sit-Stand [place] (Transfers) contact guard;verbal cues;nonverbal cues (demo/gesture)  -MA      Assistive Device (Sit-Stand Transfers) walker, front-wheeled  -MA      Recorded by [MA] Joceline Soto, PT 07/11/18 1516      Row Name 07/11/18 1500             Stand-Sit Transfer    Stand-Sit [place] (Transfers) contact guard;verbal cues;nonverbal cues (demo/gesture)  -MA      Assistive Device (Stand-Sit Transfers) walker, front-wheeled  -MA      Recorded by [MA] Joceline Soto, PT 07/11/18 1516      Row Name 07/11/18 1500             Gait/Stairs Assessment/Training    Gait/Stairs Assessment/Training gait/ambulation independence  -MA      [place] Level (Gait) minimum  assist (75% patient effort);verbal cues;nonverbal cues (demo/gesture)  -MA      Assistive Device (Gait) walker, front-wheeled  -MA      Distance in Feet (Gait) 110  -MA      Deviations/Abnormal Patterns (Gait) yoav decreased;gait speed decreased  -MA      Bilateral Gait Deviations forward flexed posture  -MA      Comment (Gait/Stairs) Distance limited 2' to pain  -MA      Recorded by [MA] Joceline Soto, PT 07/11/18 1516      Row Name 07/11/18 1500             Positioning and Restraints    Pre-Treatment Position in bed  -MA      Post Treatment Position chair  -MA      In Chair notified nsg;sitting;call light within reach;encouraged to call for assist;with family/caregiver;legs elevated  -MA      Recorded by [MA] Joceline Soto, PT 07/11/18 1516      Row Name 07/11/18 1500             Pain Scale: Numbers Pre/Post-Treatment    Pain Scale: Numbers, Pretreatment 0/10 - no pain  -MA      Pain Scale: Numbers, Post-Treatment 0/10 - no pain  -MA      Recorded by [MA] Joceline Soto, PT 07/11/18 1516      Row Name 07/11/18 1500             Plan of Care Review    Plan of Care Reviewed With family  -MA      Recorded by [MA] Joceline Soto, PT 07/11/18 1516      Row Name 07/11/18 1500             Outcome Summary/Treatment Plan (PT)    Daily Summary of Progress (PT) progress toward functional goals is good  -MA      Anticipated Discharge Disposition (PT) skilled nursing facility  -MA      Recorded by [MA] Joceline Soto, PT 07/11/18 1516        User Key  (r) = Recorded By, (t) = Taken By, (c) = Cosigned By    Initials Name Effective Dates Discipline    KIRBY Soto, PT 04/03/18 -  PT                     Physical Therapy Education     Title: PT OT SLP Therapies (Active)     Topic: Physical Therapy (Active)     Point: Mobility training (Active)    Learning Progress Summary     Learner Status Readiness Method Response Comment Documented by    Patient Active Acceptance E NR  MA 07/11/18 1510      Active Acceptance E,D NR   07/10/18 1410     Active Acceptance E,TB NR   07/09/18 1103     Active Acceptance E NR   07/08/18 1528     Active Acceptance E,TB NR   07/07/18 1022          Point: Home exercise program (Active)    Learning Progress Summary     Learner Status Readiness Method Response Comment Documented by    Patient Active Acceptance E NR  MA 07/11/18 1518     Active Acceptance E,D NR   07/10/18 1410          Point: Body mechanics (Active)    Learning Progress Summary     Learner Status Readiness Method Response Comment Documented by    Patient Active Acceptance E NR  MA 07/11/18 1518     Active Acceptance E,D NR   07/10/18 1410     Active Acceptance E,TB NR   07/09/18 1103     Active Acceptance E NR   07/08/18 1528     Active Acceptance E,TB NR   07/07/18 1022          Point: Precautions (Active)    Learning Progress Summary     Learner Status Readiness Method Response Comment Documented by    Patient Active Acceptance E NR  MA 07/11/18 1518     Active Acceptance E,D NR   07/10/18 1410     Active Acceptance E,TB NR   07/09/18 1103     Active Acceptance E NR   07/08/18 1528     Active Acceptance E,TB NR   07/07/18 1022                      User Key     Initials Effective Dates Name Provider Type Discipline     03/07/18 -  Claritza Walker, PTA Physical Therapy Assistant PT     04/03/18 -  Mikki Grant, PT Physical Therapist PT     06/22/16 -  Ketty Curry, PT Physical Therapist PT    MA 04/03/18 -  Joceline Soto, PT Physical Therapist PT                    PT Recommendation and Plan  Anticipated Discharge Disposition (PT): skilled nursing facility  Therapy Frequency (PT Clinical Impression): daily  Outcome Summary/Treatment Plan (PT)  Daily Summary of Progress (PT): progress toward functional goals is good  Anticipated Discharge Disposition (PT): skilled nursing facility  Plan of Care Reviewed With: patient, family  Progress: improving  Outcome Summary:  Improved tolerance to activity through level of independence with transfers and progression with ambulation distance. Cues for safety awareness. Will continue to progress as able.          Outcome Measures     Row Name 07/11/18 1500 07/10/18 1400 07/09/18 1219       How much help from another person do you currently need...    Turning from your back to your side while in flat bed without using bedrails? 4  -MA 4  -SM  --    Moving from lying on back to sitting on the side of a flat bed without bedrails? 3  -MA 3  -SM  --    Moving to and from a bed to a chair (including a wheelchair)? 3  -MA 3  -SM  --    Standing up from a chair using your arms (e.g., wheelchair, bedside chair)? 3  -MA 3  -SM  --    Climbing 3-5 steps with a railing? 2  -MA 3  -SM  --    To walk in hospital room? 3  -MA 3  -SM  --    AM-PAC 6 Clicks Score 18  -MA 19  -SM  --       How much help from another is currently needed...    Putting on and taking off regular lower body clothing?  --  -- 2  -AF    Bathing (including washing, rinsing, and drying)  --  -- 2  -AF    Toileting (which includes using toilet bed pan or urinal)  --  -- 3  -AF    Putting on and taking off regular upper body clothing  --  -- 3  -AF    Taking care of personal grooming (such as brushing teeth)  --  -- 3  -AF    Eating meals  --  -- 3  -AF    Score  --  -- 16  -AF       Functional Assessment    Outcome Measure Options AM-PAC 6 Clicks Basic Mobility (PT)  -MA AM-PAC 6 Clicks Basic Mobility (PT)  -SM AM-PAC 6 Clicks Daily Activity (OT)  -AF    Row Name 07/09/18 1100             How much help from another person do you currently need...    Turning from your back to your side while in flat bed without using bedrails? 4  -SM      Moving from lying on back to sitting on the side of a flat bed without bedrails? 3  -SM      Moving to and from a bed to a chair (including a wheelchair)? 3  -SM      Standing up from a chair using your arms (e.g., wheelchair, bedside chair)? 3  -SM       Climbing 3-5 steps with a railing? 3  -SM      To walk in hospital room? 3  -SM      AM-PAC 6 Clicks Score 19  -SM         Functional Assessment    Outcome Measure Options AM-PAC 6 Clicks Basic Mobility (PT)  -SM        User Key  (r) = Recorded By, (t) = Taken By, (c) = Cosigned By    Initials Name Provider Type    AF Meagan Ocampo, OTR Occupational Therapist     Claritza Walker, PTA Physical Therapy Assistant    KIRBY Soto, PT Physical Therapist           Time Calculation:         PT Charges     Row Name 07/11/18 1513             Time Calculation    Start Time 1500  -MA      Stop Time 1513  -MA      Time Calculation (min) 13 min  -MA      PT Received On 07/11/18  -MA      PT - Next Appointment 07/12/18  -MA        User Key  (r) = Recorded By, (t) = Taken By, (c) = Cosigned By    Initials Name Provider Type    KIRBY Soto, PT Physical Therapist        Therapy Suggested Charges     Code   Minutes Charges    80287 (CPT®) Hc Pt Neuromusc Re Education Ea 15 Min      79838 (CPT®) Hc Pt Ther Proc Ea 15 Min 12 1    24435 (CPT®) Hc Gait Training Ea 15 Min      68320 (CPT®) Hc Pt Therapeutic Act Ea 15 Min      58426 (CPT®) Hc Pt Manual Therapy Ea 15 Min      22953 (CPT®) Hc Pt Iontophoresis Ea 15 Min      97144 (CPT®) Hc Pt Elec Stim Ea-Per 15 Min      68922 (CPT®) Hc Pt Ultrasound Ea 15 Min      87658 (CPT®) Hc Pt Self Care/Mgmt/Train Ea 15 Min      Total  12 1        Therapy Charges for Today     Code Description Service Date Service Provider Modifiers Qty    18043926585 HC PT THER PROC EA 15 MIN 7/11/2018 Joceline Soto, PT GP 1          PT G-Codes  Outcome Measure Options: AM-PAC 6 Clicks Basic Mobility (PT)    Joceline Soto, PT  7/11/2018

## 2018-07-11 NOTE — PROGRESS NOTES
[facility]  Phone: [phone]  Florentino Hernandez MD    Subjective:  LOS: 5    Remains with aphasia. Sats 92% on RA. No cough etc. Dtrs at bedside.    Objective   Vital Signs past 24hrs    Temp range: Temp (24hrs), Av.4 °F (36.9 °C), Min:98 °F (36.7 °C), Max:98.9 °F (37.2 °C)    BP range: BP: (122-147)/(65-78) 147/65  Pulse range: Heart Rate:  [] 108  Resp rate range: Resp:  [15-18] 18    Device (Oxygen Therapy): nasal cannulaFlow (L/min):  [1-2] 2  Oxygen range:SpO2:  [90 %-100 %] 100 %      78.5 kg (173 lb); Body mass index is 31.64 kg/m².  No intake or output data in the 24 hours ending 18    Physical Exam   Cardiovascular: Normal rate and regular rhythm.    No murmur heard.  Pulmonary/Chest: Effort normal. No respiratory distress. She has decreased breath sounds. She has no wheezes. She has rales in the right lower field and the left lower field.   Abdominal: Soft. Bowel sounds are normal. She exhibits no mass. There is no tenderness.   Musculoskeletal: She exhibits no edema.   Skin: No rash noted.     Results Review:    I have reviewed the laboratory and imaging data since the last note by [facility] physician.  My annotations are noted in assessment and plan.    Medication Review:  I have reviewed the current MAR.  My annotations are noted in assessment and plan.      Pharmacy Consult      Plan   PCCM Problems  Chronic nocturnal hypoxia  COPD currently without exacerbation  Suspected obstructive sleep apnea  Relevant Medical Diagnoses  Acute left MCA stroke  Coronary artery disease  Diastolic dysfunction of the heart    Plan of Treatment  Would suggest continue duonebs qid at rehab.    Can wean O2 during day as tolerated. Should improve with ambulation etc.    Likely PRATIMA and offered PSG in past but declined. Now agrees (?) Scheduled. Continue nocturnal O2 for now.    No objection to discharge - fu with Dr. Lorne Sage.    Florentino Hernandez MD  18  7:09 PM    Part of this note may be an  electronic transcription/translation of spoken language to printed text using the Dragon Dictation System.

## 2018-07-11 NOTE — PROGRESS NOTES
Continued Stay Note  [facility]     Patient Name: Meggan Huynh  MRN: [id number]  Today's Date: 7/11/2018    Admit Date: 7/6/2018          Discharge Plan     Row Name 07/11/18 1038       Plan    Plan Comments spoke with dgt Bing Paredes and Sakshi Jacinto.  Both [facility] and [facility] have a bed for patient today.  They are to decide which facility they want and call [facility].      Row Name 07/11/18 1032       Plan    Plan Skilled care    Patient/Family in Agreement with Plan yes              Discharge Codes    No documentation.           Yue Adkins RN

## 2018-07-12 PROBLEM — G47.34 NOCTURNAL HYPOXIA: Status: ACTIVE | Noted: 2018-01-01

## 2018-07-12 PROBLEM — J44.9 COPD (CHRONIC OBSTRUCTIVE PULMONARY DISEASE) (HCC): Status: ACTIVE | Noted: 2018-01-01

## 2018-07-12 NOTE — PROGRESS NOTES
Continued Stay Note  [facility]     Patient Name: Meggan Huynh  MRN: [id number]  Today's Date: 7/12/2018    Admit Date: 7/6/2018          Discharge Plan     Row Name 07/12/18 0931       Plan    Plan [facility]               Expected Discharge Date and Time     Expected Discharge Date Expected Discharge Time    Jul 12, 2018             Rena Mayfield RN

## 2018-07-12 NOTE — PROGRESS NOTES
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  Pharmacy will continue to follow and review new medications.    Hernando ParedesD

## 2018-07-12 NOTE — PROGRESS NOTES
Case Management Discharge Note    Final Note: skilled bed at [facility]    Destination - Selection Complete     Service Request Status Selected Specialties Address Phone Number Fax Number    [facility] [address] [address] [phone]      Durable Medical Equipment     No service has been selected for the patient.      Dialysis/Infusion     No service has been selected for the patient.      Home Medical Care     No service has been selected for the patient.      Social Care     No service has been selected for the patient.        Other: Other    Final Discharge Disposition Code: 03 - skilled nursing facility (SNF)       Continued Stay Note  [facility]     Patient Name: Meggan Huynh  MRN: [id number]  Today's Date: 7/12/2018    Admit Date: 7/6/2018          Discharge Plan     Row Name 07/12/18 1124       Plan    Patient/Family in Agreement with Plan yes    Final Discharge Disposition Code 03 - skilled nursing facility (SNF)    Final Note skilled bed at [facility]    Row Name 07/12/18 1123       Plan    Plan Comments Call placed to dgt Bing Paredes @ [phone] and aware of dc.      Row Name 07/12/18 0931       Plan    Plan [facility]               Expected Discharge Date and Time     Expected Discharge Date Expected Discharge Time    Jul 12, 2018             Rena Mayfield RN

## 2018-07-12 NOTE — SIGNIFICANT NOTE
07/12/18 1128   PT Discharge Summary   Reason for Discharge Discharge from facility   Discharge Destination SNF

## 2018-07-12 NOTE — PROGRESS NOTES
Continued Stay Note  [facility]     Patient Name: Meggan Huynh  MRN: [id number]  Today's Date: 7/12/2018    Admit Date: 7/6/2018          Discharge Plan     Row Name 07/12/18 1232       Plan    Plan [facility]    Patient/Family in Agreement with Plan yes    Plan Comments Patient needing oxygen to transport to [place] today (not requiring ambulance transport for any other reason).  Pt is current [facility]/ [employer] for nocturnal o2.  Patient is qualifying for continuous o2 at this time.  Asking [facility] to order- Denise Iyer is following and can bring by transport tank.  Dgt Bing has agreed to return transport tank.      Final Discharge Disposition Code 03 - skilled nursing facility (SNF)   addendum:  [facility] called respiratory department and requested o2 transport tank.  Dgt to return tank tomorrow.             Rena Mayfield RN

## 2018-07-12 NOTE — PROGRESS NOTES
RESPIRATORY THERAPY NOTE: Pt does not have a good comprehension with using the MDI (Symbicort) effectively. The daughter has expressed concern with this and her home inhaler Advair. Suggest starting pt on comparable nebulizer treatments if neccessary, i.e. Brovana/pulmicort. Thank You

## 2018-07-12 NOTE — THERAPY TREATMENT NOTE
Acute Care - Occupational Therapy Treatment Note  [facility]     Patient Name: Meggan Huynh  : 1929  MRN: [id number]  Today's Date: 2018  Onset of Illness/Injury or Date of Surgery: 18  Date of Referral to OT: 18  Referring Physician: Dr. Bazan    Admit Date: 2018       ICD-10-CM ICD-9-CM   1. Cerebrovascular accident (CVA) due to occlusion of left middle cerebral artery (CMS/HCC) I63.512 434.91   2. Chronic systolic congestive heart failure (CMS/HCC) I50.22 428.22     428.0   3. Chronic bronchitis, unspecified chronic bronchitis type (CMS/HCC) J42 491.9   4. Oropharyngeal dysphagia R13.12 787.22   5. Generalized weakness R53.1 780.79   6. PRATIMA (obstructive sleep apnea) G47.33 327.23   7. Chronic obstructive pulmonary disease, unspecified COPD type (CMS/HCC) J44.9 496     Patient Active Problem List   Diagnosis   • Cerebrovascular accident (CVA) due to occlusion of left middle cerebral artery (CMS/HCC)   • Hypothyroidism   • Hypertension   • Hyperlipidemia   • Chronic renal disease, stage 3, moderately decreased glomerular filtration rate between 30-59 mL/min/1.73 square meter   • H/O CHF   • COPD (chronic obstructive pulmonary disease) (CMS/HCC)   • Nocturnal hypoxia     Past Medical History:   Diagnosis Date   • Ankle pain, right    • Anxiety    • CHF (congestive heart failure) (CMS/HCC)     with preserved EF   • Chronic renal disease, stage 3, moderately decreased glomerular filtration rate between 30-59 mL/min/1.73 square meter    • COPD (chronic obstructive pulmonary disease) (CMS/HCC)    • Coronary artery disease    • Depression    • Hyperlipidemia    • Hypertension    • Hypothyroidism    • Stroke (CMS/HCC)      History reviewed. No pertinent surgical history.    Therapy Treatment          Rehabilitation Treatment Summary     Row Name 18 1244             Treatment Time/Intention    Discipline occupational therapist  -SO      Document Type therapy note (daily note)  -SO       Subjective Information no complaints  -SO      Mode of Treatment individual therapy;occupational therapy  -SO      Patient/Family Observations Pt sitting up in chair with family present  -SO      Patient Effort fair  -SO      Existing Precautions/Restrictions fall  -SO      Recorded by [SO] Addie Diaz, OTR 07/12/18 1246      Row Name 07/12/18 1244             Therapeutic Exercise    Upper Extremity Range of Motion (Therapeutic Exercise) shoulder flexion/extension, bilateral;shoulder horizontal abduction/adduction, bilateral;elbow flexion/extension, bilateral  -SO      Weight/Resistance (Therapeutic Exercise) yellow  -SO      Exercise Type (Therapeutic Exercise) AROM (active range of motion)  -SO      Position (Therapeutic Exercise) seated  -SO      Sets/Reps (Therapeutic Exercise) 10x2  -SO      Comment (Therapeutic Exercise) Cues for sequencing, counting  -SO      Recorded by [SO] Addie Diaz, OTR 07/12/18 1246      Row Name 07/12/18 1244             Positioning and Restraints    Pre-Treatment Position sitting in chair/recliner  -SO      Post Treatment Position chair  -SO      In Chair sitting;call light within reach;encouraged to call for assist;with family/caregiver;exit alarm on  -SO      Recorded by [SO] Addie Diaz, OTR 07/12/18 1246        User Key  (r) = Recorded By, (t) = Taken By, (c) = Cosigned By    Initials Name Effective Dates Discipline    SO Addie Martha Diaz, OTR 04/13/15 -  OT               Occupational Therapy Education     Title: PT OT SLP Therapies (Resolved)     Topic: Occupational Therapy (Resolved)     Point: ADL training (Resolved)     Description: Instruct learner(s) on proper safety adaptation and remediation techniques during self care or transfers.   Instruct in proper use of assistive devices.   Learning Progress Summary     Learner Status Readiness Method Response Comment Documented by    Patient Done Acceptance E VU,NR edcuated on benefits and  purpose of OT during CVA recovery AF 07/09/18 1218    Family Done Acceptance E VU,NR edcuated on benefits and purpose of OT during CVA recovery AF 07/09/18 1218                      User Key     Initials Effective Dates Name Provider Type Discipline     04/03/18 -  Meagan Ocampo, OTR Occupational Therapist OT                OT Recommendation and Plan     Plan of Care Review  Plan of Care Reviewed With: patient  Plan of Care Reviewed With: patient  Outcome Summary: UE strength and endurance improving.        Outcome Measures     Row Name 07/12/18 1200 07/11/18 1527 07/11/18 1500       How much help from another person do you currently need...    Turning from your back to your side while in flat bed without using bedrails?  --  -- 4  -MA    Moving from lying on back to sitting on the side of a flat bed without bedrails?  --  -- 3  -MA    Moving to and from a bed to a chair (including a wheelchair)?  --  -- 3  -MA    Standing up from a chair using your arms (e.g., wheelchair, bedside chair)?  --  -- 3  -MA    Climbing 3-5 steps with a railing?  --  -- 2  -MA    To walk in hospital room?  --  -- 3  -MA    AM-PAC 6 Clicks Score  --  -- 18  -MA       How much help from another is currently needed...    Putting on and taking off regular lower body clothing? 2  -SO 2  -SO  --    Bathing (including washing, rinsing, and drying) 2  -SO 2  -SO  --    Toileting (which includes using toilet bed pan or urinal) 3  -SO 3  -SO  --    Putting on and taking off regular upper body clothing 3  -SO 3  -SO  --    Taking care of personal grooming (such as brushing teeth) 3  -SO 3  -SO  --    Eating meals 4  -SO 3  -SO  --    Score 17  -SO 16  -SO  --       Functional Assessment    Outcome Measure Options AM-PAC 6 Clicks Daily Activity (OT)  -SO AM-PAC 6 Clicks Daily Activity (OT)  -SO AM-PAC 6 Clicks Basic Mobility (PT)  -MA    Row Name 07/10/18 1400             How much help from another person do you currently need...    Turning  from your back to your side while in flat bed without using bedrails? 4  -SM      Moving from lying on back to sitting on the side of a flat bed without bedrails? 3  -SM      Moving to and from a bed to a chair (including a wheelchair)? 3  -SM      Standing up from a chair using your arms (e.g., wheelchair, bedside chair)? 3  -SM      Climbing 3-5 steps with a railing? 3  -SM      To walk in hospital room? 3  -SM      AM-PAC 6 Clicks Score 19  -SM         Functional Assessment    Outcome Measure Options AM-PAC 6 Clicks Basic Mobility (PT)  -SM        User Key  (r) = Recorded By, (t) = Taken By, (c) = Cosigned By    Initials Name Provider Type    BERLIN Jonas Occupational Therapist    LAURITA Walker, PTA Physical Therapy Assistant    KIRBY Soto, PT Physical Therapist           Time Calculation:         Time Calculation- OT     Row Name 07/12/18 1247             Time Calculation- OT    OT Start Time 0940  -SO      OT Stop Time 0952  -SO      OT Time Calculation (min) 12 min  -SO      Total Timed Code Minutes- OT 12 minute(s)  -SO      OT Received On 07/12/18  -SO        User Key  (r) = Recorded By, (t) = Taken By, (c) = Cosigned By    Initials Name Provider Type    BERLIN Jonas Occupational Therapist           Therapy Suggested Charges     Code   Minutes Charges    None           Therapy Charges for Today     Code Description Service Date Service Provider Modifiers Qty    60389413688 HC OT THER PROC EA 15 MIN 7/11/2018 BERLIN Laughlin GO 1    40201572498 HC OT SELF CARE/MGMT/TRAIN EA 15 MIN 7/11/2018 BERLIN Laughlin GO 1    47601762787 HC OT THER PROC EA 15 MIN 7/12/2018 BERLIN Laughlin GO 1               BERLIN Laughlin  7/12/2018

## 2018-07-12 NOTE — DISCHARGE INSTRUCTIONS
Patient is to use oxygen 2 L at night while sleeping and may be needed during the daytime if evidence of hypoxia exist    Schedule CT brain without contrast for 6/16/18 with results called to [facility] to determine if Eliquis can be started

## 2018-07-12 NOTE — DISCHARGE SUMMARY
Date of Admission: 7/6/2018  Date of Discharge:  7/12/2018  Primary Care Physician: Deavn Snow MD     Discharge Diagnosis:  Active Hospital Problems    Diagnosis Date Noted   • COPD (chronic obstructive pulmonary disease) (CMS/HCC) [J44.9] 07/12/2018   • Nocturnal hypoxia [G47.34] 07/12/2018   • Cerebrovascular accident (CVA) due to occlusion of left middle cerebral artery (CMS/HCC) [I63.512] 07/06/2018   • Hypothyroidism [E03.9] 07/06/2018   • Hypertension [I10] 07/06/2018   • Hyperlipidemia [E78.5] 07/06/2018   • Chronic renal disease, stage 3, moderately decreased glomerular filtration rate between 30-59 mL/min/1.73 square meter [N18.3] 07/06/2018   • H/O CHF [Z86.79] 07/06/2018      Resolved Hospital Problems    Diagnosis Date Noted Date Resolved   No resolved problems to display.       Presenting Problem/History of Present Illness:  Cerebrovascular accident (CVA) due to occlusion of left middle cerebral artery (CMS/HCC) [I63.512]     Hospital Course:  The patient is a 88 y.o. female who presented with Inability to talk upon wakening and a right facial droop.  She had an acute large left MCA stroke causing aphasia.  There is significant calcification of multiple vessels including the aortic arch, left subclavian, left vertebral,  right common carotid and the right internal carotid arteries.  A CT perfusion study showed decreased flow.  She was seen by neurology and neurosurgery who did not think tPA or embolectomy would help.  Aspirin and Lipitor were started.  Anticoagulation with Eliquis will be planned to start after a CT scan of brain is done on 6/16/18 if no hemorrhage is present. Please call results to [facility].    She underwent a stroke workup and Neurology and Cardiology were consulted.  There has been episodes of SVT with AV kem reentry tachycardia.  Dr. Sanchez adjusted her medications.  Amiodarone was added to Coreg.  Neurology planned anticoagulation because of the appearance  of an embolic CVA.  A Holter monitor is currently pending.  Her echocardiogram showed mitral valve regurgitation with a dilated left atrium and moderate tricuspid regurgitation.  A PFO was not present. There is a persistent aphasia.    There was a history of nocturnal hypoxia with COPD.  She had previously refused a sleep study and would not wear CPAP.  An overnight oximetry study demonstrated nocturnal hypoxia.  She had nighttime hypoxia and snoring suggestive of PRATIMA.  Her O2 saturation was below 90% up to 70% of the time on room air on the overnight study.  On room air her O2 saturations run between 89-90%.  While on 2 L there is O2 saturations in the mid 90s.  The family is concerned as his respiratory therapy that the patient may not be able to tolerate using inhalers.  If that is the case she could be switched to nebulized albuterol with Pulmicort twice a day.  Her ability to understand may improve as the stroke resolves.  She will continue on nocturnal oxygen at 2 L until a formal outpatient sleep study can be performed.  She will follow up with Dr. Sage after the sleep study has been arranged.  She will see Dr. Sanchez in 4 weeks      Exam Today:  Neuro: Some confusion does answer some my questions with yes and no.  Muscle strength in upper extremities appears to be normal.  It's difficult to assess lower extremity strength since patient does not totally understand or follow commands.  She is unable to talk beyond yes or no.  Chest: Good expansion decreased breath sounds at the bases no rales or wheezes or rhonchi   CVS: Regular rhythm without ectopics    Procedures Performed:         Consults:   Consults     Date and Time Order Name Status Description    7/9/2018 1125 Inpatient Cardiology Consult      7/9/2018 0945 Inpatient Sleep Medicine Consult Completed     7/7/2018 0902 Inpatient Cardiology Consult Completed     7/6/2018 5100 Inpatient Cardiology Consult      7/6/2018 1412 ALEK (on-call MD unless  specified) Completed            Discharge Disposition:  Skilled Nursing Facility (DC - External)    Discharge Medications:     Discharge Medications      New Medications      Instructions Start Date   albuterol (2.5 MG/3ML) 0.083% nebulizer solution  Commonly known as:  PROVENTIL   2.5 mg, Nebulization, Every 6 Hours PRN      amiodarone 200 MG tablet  Commonly known as:  PACERONE   200 mg, Oral, Every 24 Hours Scheduled   Start Date:  7/13/2018     aspirin 325 MG tablet   325 mg, Oral, Daily   Start Date:  7/13/2018     atorvastatin 80 MG tablet  Commonly known as:  LIPITOR   80 mg, Oral, Nightly      tiotropium 18 MCG per inhalation capsule  Commonly known as:  SPIRIVA   1 capsule, Inhalation, Daily - RT   Start Date:  7/13/2018        Changes to Medications      Instructions Start Date   carvedilol 12.5 MG tablet  Commonly known as:  COREG  What changed:  · medication strength  · how much to take  · when to take this  · additional instructions  · Another medication with the same name was removed. Continue taking this medication, and follow the directions you see here.   12.5 mg, Oral, 2 Times Daily With Meals      clopidogrel 75 MG tablet  Commonly known as:  PLAVIX  What changed:  Another medication with the same name was removed. Continue taking this medication, and follow the directions you see here.   75 mg, Oral, Daily      furosemide 20 MG tablet  Commonly known as:  LASIX  What changed:  Another medication with the same name was removed. Continue taking this medication, and follow the directions you see here.   20 mg, Oral, Every Morning         Continue These Medications      Instructions Start Date   ADVAIR DISKUS 250-50 MCG/DOSE DISKUS  Generic drug:  fluticasone-salmeterol   1 puff, Inhalation, Daily      azelastine 0.1 % nasal spray  Commonly known as:  ASTELIN   1 spray, Nasal, Daily, Mixed with Flunisolide and Ipratropium      Calcium 500-100 MG-UNIT chewable tablet   1 tablet, Oral, Daily Before  Lunch      cetirizine 10 MG tablet  Commonly known as:  zyrTEC   10 mg, Oral, Daily      escitalopram 10 MG tablet  Commonly known as:  LEXAPRO   10 mg, Oral, Nightly      flunisolide 25 MCG/ACT (0.025%) solution nasal spray  Commonly known as:  NASALIDE   1 spray, Inhalation, Daily, Mix with Azelastine and Ipratropium      levothyroxine 50 MCG tablet  Commonly known as:  SYNTHROID, LEVOTHROID   50 mcg, Oral, Daily      O2  Commonly known as:  OXYGEN   2 L/min, Inhalation, Nightly      Umeclidinium Bromide 62.5 MCG/INH aerosol powder   Commonly known as:  INCRUSE ELLIPTA   62.5 mcg, Inhalation, Daily      Vitamin D 2000 units tablet   2,000 Units, Oral, Every Morning      zafirlukast 20 MG tablet  Commonly known as:  ACCOLATE   20 mg, Oral, Daily PRN         Stop These Medications    Fluticasone Furoate-Vilanterol 100-25 MCG/INH aerosol powder   Commonly known as:  BREO ELLIPTA     ipratropium 0.03 % nasal spray  Commonly known as:  ATROVENT     pravastatin 40 MG tablet  Commonly known as:  PRAVACHOL            Discharge Diet: Unlimited    Activity at Discharge: Unlimited    Follow-up Appointments:  Future Appointments  Date Time Provider Department Center   8/19/2018 8:30 PM [facility] SLEEP ROOMS [facility] SLEEP [facility]         Test Results Pending at Discharge:Needs to have CT scan of brain scheduled for 6/16/18 at [facility].         Myrtle Guerra MD  07/12/18  10:44 AM    Time Spent on Discharge Activities: More than 45 minutes

## 2018-07-12 NOTE — PLAN OF CARE
Problem: Fall Risk (Adult)  Goal: Identify Related Risk Factors and Signs and Symptoms  Outcome: Ongoing (interventions implemented as appropriate)      Problem: Patient Care Overview  Goal: Plan of Care Review  Outcome: Ongoing (interventions implemented as appropriate)   07/12/18 0501   OTHER   Outcome Summary VSS, patient is in normal sinus with first degree block and bundle branch block, still very aphasic, dc today to [facility], will continue to monitor.   Coping/Psychosocial   Plan of Care Reviewed With patient   Plan of Care Review   Progress improving

## 2018-07-12 NOTE — OUTREACH NOTE
Skilled Nursing Facility Discharge Flowsheet:     Skilled Nursing Facility Discharge Assessment 7/12/2018   Acute Facility Discharged From [facility]   Acute Discharge Date 7/12/2018   Name of the Skilled Nursing Facility? [facility] Level of the Skilled Nursing Facility 1   Purpose of SNF Admission PT;OT;SN;SP;Dietitian   Estimated length of stay for the patient? To be determined   Who is the insurance provider or payor of patient stay? Medicare

## 2018-07-13 NOTE — TELEPHONE ENCOUNTER
Francine called from [facility].  Patient is coming in on Monday and you placed the order but did not sign the order for CT without contrast.  Will you please sing?    Thanks  Amy  [phone]

## 2018-07-13 NOTE — TELEPHONE ENCOUNTER
This is not something Dr. Sanchez said he ordered. This needs to be forwarded to his neurologist.    Amie

## 2018-07-16 NOTE — PROGRESS NOTES
Case Management Discharge Note    Final Note: skilled bed at [facility]    Destination - Selection Complete     Service Request Status Selected Specialties Address Phone Number Fax Number    [facility] [address] [address] [phone]      Durable Medical Equipment     No service has been selected for the patient.      Dialysis/Infusion     No service has been selected for the patient.      Home Medical Care     No service has been selected for the patient.      Social Care     No service has been selected for the patient.        Other: Other (pvt car- family (daughter))    Final Discharge Disposition Code: 03 - skilled nursing facility (SNF)

## 2018-07-18 NOTE — OUTREACH NOTE
Skilled Nursing Facility Discharge Flowsheet:     Skilled Nursing Facility Discharge Assessment 7/18/2018   Acute Facility Discharged From [facility]   Acute Discharge Date 7/12/2018   Name of the Skilled Nursing Facility? [facility] Level of the Skilled Nursing Facility 1   Purpose of SNF Admission PT;OT;SN;SP;Dietitian   Estimated length of stay for the patient? To be determined   Who is the insurance provider or payor of patient stay? Medicare   Progression of Patient? AWAIT UPDATE FROM LIAISON/ PROGRAM

## 2018-07-25 NOTE — TELEPHONE ENCOUNTER
----- Message from Flako Sanchez MD sent at 7/23/2018  4:37 PM EDT -----  I'm going to bring her in in the next couple weeks to discuss.  We never documented her to have atrial fibrillation.  I was informed by the neurology team that they would like her on anticoagulation due to it being a large vessel occlusion.  Her arrhythmia was SVT.    ----- Message -----  From: RESHMA Baxter  Sent: 7/16/2018   5:03 PM  To: Flako Sanchez MD, #    Dr. Abou-Chebl reviewed her CT head 7/16/18 and it is negative for hemorrhage. Okay to start anticoagulation if indicated. FU in 3 months in the stroke clinic      ----- Message -----  From: Lesvia Lopez MA  Sent: 7/16/2018   3:39 PM  To: RESHMA Baxter, RESHMA Ocampo    Pt is here now having the CT Head done.    ----- Message -----  From: RESHMA Baxter  Sent: 7/16/2018  12:13 PM  To: Lesvia Lopez MA    This is an LQ patient. She was to have a CT head today and call results to LQ and decide on anticoagulation. She was D/C. Can you make sure this is in correctly, outpatient CT today. If not it needs to get done today or tomorrow and call LQ to start anticoagulation

## 2018-07-25 NOTE — OUTREACH NOTE
Skilled Nursing Facility Discharge Flowsheet:     Skilled Nursing Facility Discharge Assessment 7/25/2018   Acute Facility Discharged From [facility]   Acute Discharge Date 7/12/2018   Name of the Skilled Nursing Facility? [facility] Level of the Skilled Nursing Facility 1   Purpose of SNF Admission PT;OT;SN;SP;Dietitian   Estimated length of stay for the patient? To be determined   Who is the insurance provider or payor of patient stay? Medicare   Progression of Patient? AWAIT UPDATE FROM LIAISON/ PROGRAM

## 2018-07-25 NOTE — TELEPHONE ENCOUNTER
Her anticoagulation was recommended by the neurology team not us. I will either wait to start at that myself when I see her or they can pull the trigger on that. She has not had documented atrial fib. This anticoagulation would be for her stroke

## 2018-07-25 NOTE — TELEPHONE ENCOUNTER
----- Message from RESHMA Baxter sent at 7/24/2018  4:40 PM EDT -----  Lesvia can you save my portion of the note to the chart about the CT head that A-C reviewed   ----- Message -----  From: Flako Sanchez MD  Sent: 7/23/2018   4:37 PM  To: RESHMA Baxter, Amie Nixon MA    I'm going to bring her in in the next couple weeks to discuss.  We never documented her to have atrial fibrillation.  I was informed by the neurology team that they would like her on anticoagulation due to it being a large vessel occlusion.  Her arrhythmia was SVT.    ----- Message -----  From: RESHMA Baxter  Sent: 7/16/2018   5:03 PM  To: Flako Sanchez MD, #    Dr. Abou-Chebl reviewed her CT head 7/16/18 and it is negative for hemorrhage. Okay to start anticoagulation if indicated. FU in 3 months in the stroke clinic      ----- Message -----  From: Lesvia Loepz MA  Sent: 7/16/2018   3:39 PM  To: RESHMA Baxter, RESHMA Ocampo    Pt is here now having the CT Head done.    ----- Message -----  From: RESHMA Baxter  Sent: 7/16/2018  12:13 PM  To: Lesvia Lopez MA    This is an LQ patient. She was to have a CT head today and call results to LQ and decide on anticoagulation. She was D/C. Can you make sure this is in correctly, outpatient CT today. If not it needs to get done today or tomorrow and call LQ to start anticoagulation

## 2018-07-25 NOTE — TELEPHONE ENCOUNTER
I received a call from Sammi lee about starting her on anti-coagulation. They would like to know if you are planning on doing that and if so what you would like her to be on.  Are you wanting to do that now or wait until she sees us. Please advise.  Donna #[phone]    Thanks,  Amie

## 2018-07-27 NOTE — TELEPHONE ENCOUNTER
I was told by Vanda at [facility] that the pt's family mentioned following up with a previous cardiologist. I called pt's daughter and left a message asking about this and making an appt as well.    Amie

## 2018-07-27 NOTE — TELEPHONE ENCOUNTER
Pt's daughter called back. They will be following up Dr. Tan's office on 8/9/18, instead of us.    Amie

## 2018-08-01 NOTE — OUTREACH NOTE
Skilled Nursing Facility Discharge Flowsheet:     Skilled Nursing Facility Discharge Assessment 8/1/2018   Acute Facility Discharged From [facility]   Acute Discharge Date 7/12/2018   Name of the Skilled Nursing Facility? [facility] Level of the Skilled Nursing Facility 1   Purpose of SNF Admission PT;OT;SN;SP;Dietitian   Estimated length of stay for the patient? To be determined   Who is the insurance provider or payor of patient stay? Medicare   Progression of Patient? AWAIT UPDATE FROM LIAISON/ PROGRAM

## 2018-08-02 NOTE — TELEPHONE ENCOUNTER
----- Message from RESHMA Ocampo sent at 8/2/2018  1:24 PM EDT -----  I put in prescription for Eliquis 2.5 mg BID and I changed her  mg to ASA  81 mg EC.  She also needs to stop her Plavix.  Thanks.   ----- Message -----  From: Lesvia Lopez MA  Sent: 7/30/2018   2:15 PM  To: RESHMA Ocampo    The RN at [facility] s/w Dr. Sanchez today and stated he will not start the anticoagulation and told her we need to prescribe.     ----- Message -----  From: RESHMA Ocampo  Sent: 7/25/2018   3:00 PM  To: Lesvia Lopez MA    CT was negative for hemorrhage, okay to start anticoagulation, Dr. Sanchez to start. F/U in 3 months with stroke clinic    ----- Message -----  From: Jennifer Cr RN  Sent: 7/23/2018   2:15 PM  To: RESHMA Ocampo    [facility] called,  The CTA done 7/16 results are to be called and they have not heard anything.  The resident MD would like the results.  [facility] [phone]    Fax number: [phone] Thanks

## 2018-08-02 NOTE — TELEPHONE ENCOUNTER
I s/w Vanda RN at [facility] and she is aware what [facility] has ordered. Pt will take Eliquis and baby ASA and d/c Plavix.

## 2018-08-03 NOTE — OUTREACH NOTE
Skilled Nursing Facility Discharge Flowsheet:     Skilled Nursing Facility Discharge Assessment 8/3/2018   Acute Facility Discharged From [facility]   Acute Discharge Date 7/12/2018   Name of the Skilled Nursing Facility? [facility] Level of the Skilled Nursing Facility 1   Purpose of SNF Admission PT;OT;SN;SP;Dietitian   Estimated length of stay for the patient? -   Who is the insurance provider or payor of patient stay? Medicare   Progression of Patient? -   Skilled Nursing Discharge Date? 8/7/2018   Where was the patient discharged to? LTC

## 2019-06-03 ENCOUNTER — TELEPHONE (OUTPATIENT)
Dept: FAMILY MEDICINE CLINIC | Facility: CLINIC | Age: 84
End: 2019-06-03

## 2021-01-12 ENCOUNTER — TELEPHONE (OUTPATIENT)
Dept: FAMILY MEDICINE CLINIC | Facility: CLINIC | Age: 86
End: 2021-01-12

## 2021-01-12 NOTE — TELEPHONE ENCOUNTER
DELETE AFTER REVIEWING: Telephone encounter to be sent to the clinical pool     “Please be informed that patient has passed. Patient has been marked  in the system. The date of death is: 2019    Caller: Rani Pearson    Relationship: Emergency Contact -DAUGHTER  Best call back number: [phone]